# Patient Record
Sex: FEMALE | Race: BLACK OR AFRICAN AMERICAN | NOT HISPANIC OR LATINO | Employment: STUDENT | URBAN - METROPOLITAN AREA
[De-identification: names, ages, dates, MRNs, and addresses within clinical notes are randomized per-mention and may not be internally consistent; named-entity substitution may affect disease eponyms.]

---
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
## 2023-02-03 ENCOUNTER — PATIENT MESSAGE (OUTPATIENT)
Dept: PEDIATRICS | Facility: CLINIC | Age: 18
End: 2023-02-03
Payer: COMMERCIAL

## 2023-02-28 ENCOUNTER — HOSPITAL ENCOUNTER (EMERGENCY)
Facility: HOSPITAL | Age: 18
Discharge: HOME | End: 2023-02-28
Attending: EMERGENCY MEDICINE
Payer: COMMERCIAL

## 2023-02-28 VITALS
OXYGEN SATURATION: 98 % | RESPIRATION RATE: 16 BRPM | BODY MASS INDEX: 18.64 KG/M2 | HEIGHT: 66 IN | HEART RATE: 89 BPM | WEIGHT: 116 LBS | DIASTOLIC BLOOD PRESSURE: 74 MMHG | SYSTOLIC BLOOD PRESSURE: 136 MMHG | TEMPERATURE: 100 F

## 2023-02-28 DIAGNOSIS — J02.9 VIRAL PHARYNGITIS: ICD-10-CM

## 2023-02-28 DIAGNOSIS — J06.9 VIRAL URI WITH COUGH: Primary | ICD-10-CM

## 2023-02-28 LAB
B-HCG UR QL: NEGATIVE
CTP QC/QA: YES
MOLECULAR STREP A: NEGATIVE
POC MOLECULAR INFLUENZA A AGN: NEGATIVE
POC MOLECULAR INFLUENZA B AGN: NEGATIVE
SARS-COV-2 RDRP RESP QL NAA+PROBE: NEGATIVE

## 2023-02-28 PROCEDURE — 81025 URINE PREGNANCY TEST: CPT | Performed by: PHYSICIAN ASSISTANT

## 2023-02-28 PROCEDURE — 25000003 PHARM REV CODE 250: Performed by: PHYSICIAN ASSISTANT

## 2023-02-28 PROCEDURE — 87651 STREP A DNA AMP PROBE: CPT

## 2023-02-28 PROCEDURE — 96372 THER/PROPH/DIAG INJ SC/IM: CPT | Performed by: PHYSICIAN ASSISTANT

## 2023-02-28 PROCEDURE — 99284 EMERGENCY DEPT VISIT MOD MDM: CPT | Mod: 25

## 2023-02-28 PROCEDURE — 87502 INFLUENZA DNA AMP PROBE: CPT

## 2023-02-28 PROCEDURE — 63600175 PHARM REV CODE 636 W HCPCS: Performed by: PHYSICIAN ASSISTANT

## 2023-02-28 RX ORDER — IBUPROFEN 600 MG/1
600 TABLET ORAL
Status: COMPLETED | OUTPATIENT
Start: 2023-02-28 | End: 2023-02-28

## 2023-02-28 RX ORDER — DEXAMETHASONE SODIUM PHOSPHATE 4 MG/ML
12 INJECTION, SOLUTION INTRA-ARTICULAR; INTRALESIONAL; INTRAMUSCULAR; INTRAVENOUS; SOFT TISSUE
Status: COMPLETED | OUTPATIENT
Start: 2023-02-28 | End: 2023-02-28

## 2023-02-28 RX ORDER — GUAIFENESIN/DEXTROMETHORPHAN 100-10MG/5
10 SYRUP ORAL 4 TIMES DAILY PRN
Qty: 120 ML | Refills: 0 | Status: SHIPPED | OUTPATIENT
Start: 2023-02-28 | End: 2023-03-10

## 2023-02-28 RX ADMIN — DEXAMETHASONE SODIUM PHOSPHATE 12 MG: 4 INJECTION INTRA-ARTICULAR; INTRALESIONAL; INTRAMUSCULAR; INTRAVENOUS; SOFT TISSUE at 08:02

## 2023-02-28 RX ADMIN — IBUPROFEN 600 MG: 600 TABLET ORAL at 08:02

## 2023-02-28 NOTE — Clinical Note
"Alessandro Pérez" Sajan was seen and treated in our emergency department on 2/28/2023.  She may return to school on 03/01/2023.      If you have any questions or concerns, please don't hesitate to call.       RN"

## 2023-02-28 NOTE — Clinical Note
"Alessandro Pérez" Sajan was seen and treated in our emergency department on 2/28/2023.  She may return to school on 03/02/2023.      If you have any questions or concerns, please don't hesitate to call.       RN"

## 2023-03-01 NOTE — ED PROVIDER NOTES
Encounter Date: 2/28/2023       History     Chief Complaint   Patient presents with    flu like symptoms     The patient reports a headache, sore throat, dizziness, coughing up blood, and body aches x 3 days.Patient reports taking tylenol and a cough drop last night around 0800 and a cough drop this morning.     17-year-old female presents to ED complaining of 4 day history productive cough, congestion, odynophagia, fever, chills, myalgias, generally feeling unwell, poor appetite and intake.  Begin with scant blood mixed in with mucus during cough today.  No chest pain.  No shortness of breath.  No syncope or near-syncope.  Admits to feeling lightheaded.  No palpitations.  No significant cardiac or pulmonary issues.  No relief with over-the-counter cough drops, with Tylenol.  No fever since yesterday.  Symptoms are acute, constant, mild to moderate.  No alleviating or exacerbating factors.  No radiation symptoms.    No urinary complaints.  No abdominal pain.  No nausea vomiting diarrhea.  No history of any bleeding disorder.  No daily antiplatelet or anticoagulation use.    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     Past Surgical History:   Procedure Laterality Date    COLONOSCOPY N/A 8/6/2021    Procedure: COLONOSCOPY;  Surgeon: Constantino Shea MD;  Location: [address]);  Service: Endoscopy;  Laterality: N/A;    ESOPHAGOGASTRODUODENOSCOPY N/A 8/6/2021    Procedure: (EGD);  Surgeon: Constantino Shea MD;  Location: [address]);  Service: Endoscopy;  Laterality: N/A;  covid test 8/3 [facility]     Family History   Problem Relation Age of Onset    Sleep apnea Mother     Hypertension Mother     Mitral valve prolapse Mother     Alcohol abuse Mother     Drug abuse Mother     Alcohol abuse Father     Drug abuse Father     Mental illness Father     Congenital heart  disease Sister     Learning disabilities Brother     Diabetes Maternal Grandmother     Ovarian cancer Maternal Grandmother     Hypertension Maternal Grandmother     Hyperlipidemia Maternal Grandmother     Obesity Paternal Aunt     Diabetes Paternal Aunt     Obesity Paternal Uncle     Hypertension Paternal Uncle     Diabetes Paternal Uncle     Hyperlipidemia Paternal Uncle     Diabetes Paternal Grandmother     Hyperlipidemia Paternal Grandmother     Hypertension Paternal Grandmother     Cancer Paternal Grandmother 62        ovarian    Melanoma Neg Hx     Psoriasis Neg Hx     Lupus Neg Hx      Social History     Tobacco Use    Smoking status: Every Day     Types: Cigarettes    Smokeless tobacco: Current   Substance Use Topics    Alcohol use: No    Drug use: No     Review of Systems   Constitutional:  Positive for appetite change, chills and fever.   HENT:  Positive for congestion and sore throat.    Respiratory:  Positive for cough. Negative for shortness of breath.    Cardiovascular:  Negative for chest pain and palpitations.   Gastrointestinal:  Negative for abdominal pain, diarrhea, nausea and vomiting.   Genitourinary:  Negative for dysuria and frequency.   Musculoskeletal:  Positive for myalgias. Negative for neck pain and neck stiffness.   Neurological:  Positive for light-headedness and headaches. Negative for syncope.     Physical Exam     Initial Vitals [02/28/23 1836]   BP Pulse Resp Temp SpO2   136/74 89 16 100.1 °F (37.8 °C) 98 %      MAP       --         Physical Exam    Nursing note and vitals reviewed.  Constitutional: She appears well-developed and well-nourished. She is not diaphoretic. No distress.   HENT:   Head: Normocephalic and atraumatic.   Nasal congestion.  There is mild posterior oropharyngeal erythema, bilateral tonsils with white patchy exudate left greater than right.   Neck: Neck supple.   Bilateral anterior cervical adenopathy left-greater-than-right.   Normal range of  motion.  Cardiovascular:  Intact distal pulses.           Normal sinus rhythm   Pulmonary/Chest: Breath sounds normal. No respiratory distress.   Musculoskeletal:         General: Normal range of motion.      Cervical back: Normal range of motion and neck supple.     Neurological: She is alert and oriented to person, place, and time. GCS score is 15. GCS eye subscore is 4. GCS verbal subscore is 5. GCS motor subscore is 6.   Skin: Skin is warm. Capillary refill takes less than 2 seconds.   Psychiatric: She has a normal mood and affect. Thought content normal.       ED Course   Procedures  Labs Reviewed   SARS-COV-2 RDRP GENE   POCT INFLUENZA A/B MOLECULAR   POCT STREP A MOLECULAR   POCT URINE PREGNANCY          Imaging Results              X-Ray Chest PA And Lateral (Final result)  Result time 02/28/23 19:45:34      Final result by Caleb Mckeon MD (02/28/23 19:45:34)                   Impression:      1. No acute cardiopulmonary process.      Electronically signed by: Caleb Mckeon MD  Date:    02/28/2023  Time:    19:45               Narrative:    EXAMINATION:  XR CHEST PA AND LATERAL    CLINICAL HISTORY:  Other specified cough    TECHNIQUE:  PA and lateral views of the chest were performed.    COMPARISON:  08/28/2017    FINDINGS:  The cardiomediastinal silhouette is not enlarged.  There is no pleural effusion.  The trachea is midline.  The lungs are symmetrically expanded bilaterally without evidence of acute parenchymal process. No large focal consolidation seen.  There is no pneumothorax.  The osseous structures are unremarkable.                                       Medications   dexAMETHasone injection 12 mg (12 mg Intramuscular Given 2/28/23 2015)   ibuprofen tablet 600 mg (600 mg Oral Given 2/28/23 2014)     Medical Decision Making:   Differential Diagnosis:   Viral pharyngitis, viral URI, pneumonia, bronchitis, rhinitis, sinusitis  ED Management:  Suspected hemoptysis related to viral URI, possibly  bronchitis.  Suspect viral pharyngitis given negative strep.  Lungs clear.  No hypoxia.  Young and otherwise healthy with no significant comorbidities.  Advised continued cough drops p.r.n., begin antitussive, Tylenol ibuprofen as needed for odynophagia, headaches, myalgias etc..  Advised follow-up with local PCP for any persistent symptoms.  Advised to return if worsening lightheadedness, if she begins with large volume hemoptysis, if she feels near syncopal, if any other problems occur.                        Clinical Impression:   Final diagnoses:  [J06.9] Viral URI with cough (Primary)  [J02.9] Viral pharyngitis        ED Disposition Condition    Discharge Stable          ED Prescriptions       Medication Sig Dispense Start Date End Date Auth. Provider    dextromethorphan-guaiFENesin  mg/5 ml (ROBITUSSIN-DM)  mg/5 mL liquid Take 10 mLs by mouth 4 (four) times daily as needed (cough). 120 mL 2/28/2023 3/10/2023 Oleg Chapin PA-C          Follow-up Information       Follow up With Specialties Details Why Contact Info    [facility] Emergency Medicine  As needed [id number]  [facility] [id number] [phone]             Oleg Chapin PA-C  03/01/23 0144

## 2023-03-01 NOTE — DISCHARGE INSTRUCTIONS
Follow-up and establish care with a local primary care provider.    Robitussin as needed for cough.  Continue with Tylenol/ibuprofen as needed for fever, as needed for sore throat, as needed for body aches.  Drink lots of fluids, stay well hydrated.    Return to this ED if unable to treat fever, if you begin with shortness of breath, if you feel as if you are going to pass out, if you develop chest pain, if you continue with worsening bloody cough, if any other problems occur.

## 2023-03-01 NOTE — FIRST PROVIDER EVALUATION
Emergency Department TeleTriage Encounter Note      CHIEF COMPLAINT    Chief Complaint   Patient presents with    flu like symptoms     The patient reports a headache, sore throat, dizziness, coughing up blood, and body aches x 3 days.Patient reports taking tylenol and a cough drop last night around 0800 and a cough drop this morning.       VITAL SIGNS   Initial Vitals [02/28/23 1836]   BP Pulse Resp Temp SpO2   136/74 89 16 100.1 °F (37.8 °C) 98 %      MAP       --            ALLERGIES    Review of patient's allergies indicates:  No Known Allergies    PROVIDER TRIAGE NOTE  Patient presenting with sore throat, headaches, body aches x 3 days.  She has coughing productive of yellow mucous with blood on 2 occasions.      Viral swabs , strep swab ordered.    Chest xray pending ED provider evaluation.      Initial orders will be placed and care will be transferred to an alternate provider when patient is roomed for a full evaluation. Any additional orders and the final disposition will be determined by that provider.         ORDERS  Labs Reviewed   SARS-COV-2 RDRP GENE   POCT INFLUENZA A/B MOLECULAR   POCT STREP A MOLECULAR       ED Orders (720h ago, onward)      Start Ordered     Status Ordering Provider    02/28/23 1840 02/28/23 1839  POCT COVID-19 Rapid Screening  Once         Ordered OLRIN ORTIZ.    02/28/23 1840 02/28/23 1839  POCT Influenza A/B Molecular  Once         Ordered LORIN ORTIZ.    02/28/23 1840 02/28/23 1839  POCT Strep A, Molecular  Once         Ordered LORIN ORTIZ.              Virtual Visit Note: The provider triage portion of this emergency department evaluation and documentation was performed via [employer], a HIPAA-compliant telemedicine application, in concert with a tele-presenter in the room. A face to face patient evaluation with one of my colleagues will occur once the patient is placed in an emergency department room.      DISCLAIMER: This note was prepared with  [employer] voice recognition transcription software. Garbled syntax, mangled pronouns, and other bizarre constructions may be attributed to that software system.

## 2023-06-27 ENCOUNTER — HOSPITAL ENCOUNTER (EMERGENCY)
Facility: HOSPITAL | Age: 18
Discharge: HOME | End: 2023-06-27
Attending: EMERGENCY MEDICINE
Payer: COMMERCIAL

## 2023-06-27 VITALS
TEMPERATURE: 98 F | HEIGHT: 67 IN | HEART RATE: 79 BPM | SYSTOLIC BLOOD PRESSURE: 113 MMHG | DIASTOLIC BLOOD PRESSURE: 68 MMHG | BODY MASS INDEX: 18.05 KG/M2 | WEIGHT: 115 LBS | OXYGEN SATURATION: 100 % | RESPIRATION RATE: 17 BRPM

## 2023-06-27 DIAGNOSIS — T14.90XA INJURY: ICD-10-CM

## 2023-06-27 DIAGNOSIS — M25.529 ELBOW PAIN: ICD-10-CM

## 2023-06-27 DIAGNOSIS — M25.512 ACUTE PAIN OF LEFT SHOULDER: Primary | ICD-10-CM

## 2023-06-27 DIAGNOSIS — S40.012A CONTUSION OF LEFT SHOULDER, INITIAL ENCOUNTER: ICD-10-CM

## 2023-06-27 DIAGNOSIS — M54.2 NECK PAIN: ICD-10-CM

## 2023-06-27 LAB
B-HCG UR QL: NEGATIVE
CTP QC/QA: YES

## 2023-06-27 PROCEDURE — 81025 URINE PREGNANCY TEST: CPT | Mod: ER | Performed by: EMERGENCY MEDICINE

## 2023-06-27 PROCEDURE — 96372 THER/PROPH/DIAG INJ SC/IM: CPT | Performed by: PHYSICIAN ASSISTANT

## 2023-06-27 PROCEDURE — 99284 EMERGENCY DEPT VISIT MOD MDM: CPT | Mod: ER

## 2023-06-27 PROCEDURE — 25000003 PHARM REV CODE 250: Mod: ER | Performed by: PHYSICIAN ASSISTANT

## 2023-06-27 PROCEDURE — 63600175 PHARM REV CODE 636 W HCPCS: Mod: ER | Performed by: PHYSICIAN ASSISTANT

## 2023-06-27 RX ORDER — IBUPROFEN 600 MG/1
600 TABLET ORAL EVERY 6 HOURS PRN
Qty: 20 TABLET | Refills: 0 | Status: SHIPPED | OUTPATIENT
Start: 2023-06-27 | End: 2023-07-02

## 2023-06-27 RX ORDER — ACETAMINOPHEN 500 MG
500 TABLET ORAL EVERY 4 HOURS PRN
Qty: 20 TABLET | Refills: 0 | Status: SHIPPED | OUTPATIENT
Start: 2023-06-27 | End: 2023-07-02

## 2023-06-27 RX ORDER — KETOROLAC TROMETHAMINE 30 MG/ML
30 INJECTION, SOLUTION INTRAMUSCULAR; INTRAVENOUS
Status: COMPLETED | OUTPATIENT
Start: 2023-06-27 | End: 2023-06-27

## 2023-06-27 RX ORDER — CYCLOBENZAPRINE HCL 10 MG
10 TABLET ORAL 3 TIMES DAILY PRN
Qty: 20 TABLET | Refills: 0 | Status: SHIPPED | OUTPATIENT
Start: 2023-06-27 | End: 2023-07-04

## 2023-06-27 RX ORDER — CYCLOBENZAPRINE HCL 10 MG
10 TABLET ORAL
Status: COMPLETED | OUTPATIENT
Start: 2023-06-27 | End: 2023-06-27

## 2023-06-27 RX ORDER — LIDOCAINE 50 MG/G
1 PATCH TOPICAL DAILY
Qty: 15 PATCH | Refills: 0 | Status: SHIPPED | OUTPATIENT
Start: 2023-06-27 | End: 2023-07-12

## 2023-06-27 RX ADMIN — CYCLOBENZAPRINE 10 MG: 10 TABLET, FILM COATED ORAL at 03:06

## 2023-06-27 RX ADMIN — KETOROLAC TROMETHAMINE 30 MG: 30 INJECTION, SOLUTION INTRAMUSCULAR; INTRAVENOUS at 03:06

## 2023-06-27 NOTE — ED PROVIDER NOTES
"Encounter Date: 6/27/2023       History     Chief Complaint   Patient presents with    Arm Injury     Pt states " I think my arm is fractured." Pt unable to move right arm. Pulses 2+. Ibuprofen taken 1 hour pta     18yo female presents complaining of unilateral R-sided shoulder and elbow pain since yesterday after falling onto area. Pt states she was playing with her nephew when she fell onto her R elbow and then onto her R shoulder on hardwood floor. Pt states she went to [facility] yesterday where Xrays of R shoulder and R humerus were performed displaying no acute fractures. Pt denies any open wounds or lacerations to area. Pt denies any head trauma. Pt denies any involvement of alcohol/drugs. Pt admits to associated intermittent numbness and tingling along RUE. Pt has taken ibuprofen with no relief of symptoms. Pt currently wearing R sided shoulder sling.     The history is provided by the patient.   Review of patient's allergies indicates:  No Known Allergies  Past Medical History:   Diagnosis Date    Proteinuria, postural 2012    benign     Past Surgical History:   Procedure Laterality Date    COLONOSCOPY N/A 8/6/2021    Procedure: COLONOSCOPY;  Surgeon: Constantino Shea MD;  Location: [facility] ([address] [place] [address]);  Service: Endoscopy;  Laterality: N/A;    ESOPHAGOGASTRODUODENOSCOPY N/A 8/6/2021    Procedure: (EGD);  Surgeon: Constantino Shea MD;  Location: [facility] ([address] [place] [address]);  Service: Endoscopy;  Laterality: N/A;  covid test 8/3 [facility]     Family History   Problem Relation Age of Onset    Sleep apnea Mother     Hypertension Mother     Mitral valve prolapse Mother     Alcohol abuse Mother     Drug abuse Mother     Alcohol abuse Father     Drug abuse Father     Mental illness Father     Congenital heart disease Sister     Learning disabilities Brother     Diabetes Maternal Grandmother     Ovarian cancer Maternal Grandmother     Hypertension Maternal Grandmother     Hyperlipidemia Maternal Grandmother     Obesity " Paternal Aunt     Diabetes Paternal Aunt     Obesity Paternal Uncle     Hypertension Paternal Uncle     Diabetes Paternal Uncle     Hyperlipidemia Paternal Uncle     Diabetes Paternal Grandmother     Hyperlipidemia Paternal Grandmother     Hypertension Paternal Grandmother     Cancer Paternal Grandmother 62        ovarian    Melanoma Neg Hx     Psoriasis Neg Hx     Lupus Neg Hx      Social History     Tobacco Use    Smoking status: Every Day     Types: Cigarettes    Smokeless tobacco: Current   Substance Use Topics    Alcohol use: No    Drug use: No     Review of Systems   Constitutional: Negative.  Negative for fever.   HENT:  Negative for sore throat.    Respiratory: Negative.  Negative for shortness of breath.    Cardiovascular: Negative.  Negative for chest pain.   Gastrointestinal:  Negative for nausea.   Genitourinary:  Negative for dysuria.   Musculoskeletal:  Positive for arthralgias, myalgias and neck pain. Negative for back pain.   Skin: Negative.  Negative for rash.   Neurological: Negative.  Negative for weakness.   Hematological:  Does not bruise/bleed easily.     Physical Exam     Initial Vitals [06/27/23 1432]   BP Pulse Resp Temp SpO2   110/75 90 16 98.3 °F (36.8 °C) 100 %      MAP       --         Physical Exam    Nursing note and vitals reviewed.  Constitutional: She appears well-developed and well-nourished. No distress.   HENT:   Head: Normocephalic and atraumatic.   Eyes: EOM are normal. Pupils are equal, round, and reactive to light.   Neck:   Normal range of motion.  Cardiovascular:  Intact distal pulses.           Pulses:       Radial pulses are 2+ on the right side and 2+ on the left side.   Musculoskeletal:      Right shoulder: Tenderness present. No deformity or laceration. Decreased range of motion. Normal pulse.      Left shoulder: Normal. Normal range of motion. Normal pulse.      Cervical back: Normal range of motion. No swelling, edema, deformity or tenderness. Normal range of motion.       Comments: No midline ttp of spine  TTP over bilateral cervical paraspinal musculature  TTP over R anterior shoulder with limited ROM to 45 degrees due to pain.   TTP over the R proximal humerus  No bony ttp to the R elbow, R forearm wrist or hand  Decreased  strength to R hand due to reported pain in R shoulder with strength testing  Pt able to flex and extend the R wrist and elbow fully        Neurological: She is alert and oriented to person, place, and time. No sensory deficit.       ED Course   Procedures  Labs Reviewed   POCT URINE PREGNANCY          Imaging Results              X-Ray Cervical Spine AP And Lateral (Final result)  Result time 06/27/23 17:04:05      Final result by Jb Fontenot MD (06/27/23 17:04:05)                   Impression:      No acute radiographic abnormality.      Electronically signed by: Jb Fontenot  Date:    06/27/2023  Time:    17:04               Narrative:    EXAMINATION:  XR CERVICAL SPINE AP LATERAL    CLINICAL HISTORY:  Cervicalgia    TECHNIQUE:  AP, lateral and open mouth views of the cervical spine were performed.    COMPARISON:  None.    FINDINGS:  No acute fracture or traumatic subluxation.  No significant degenerative change.  Soft tissues are within normal limits.  The odontoid process is midline.                                       X-Ray Humerus 2 View Right (Final result)  Result time 06/27/23 17:05:19   Procedure changed from X-Ray Elbow Complete Right     Final result by Jb Fontenot MD (06/27/23 17:05:19)                   Impression:      No acute radiographic abnormality.      Electronically signed by: Jb Fontenot  Date:    06/27/2023  Time:    17:05               Narrative:    EXAMINATION:  XR HUMERUS 2 VIEW RIGHT    CLINICAL HISTORY:  pain;  Pain in unspecified elbow    TECHNIQUE:  Two views of the right humerus    COMPARISON:  None    FINDINGS:  No acute fracture, subluxation or dislocation.  No mass or foreign body.  No significant  arthropathy.                                       X-Ray Shoulder Complete 2 View Right (Final result)  Result time 06/27/23 17:07:23      Final result by Jb Fontenot MD (06/27/23 17:07:23)                   Impression:      No acute radiographic abnormality.      Electronically signed by: bJ Fontenot  Date:    06/27/2023  Time:    17:07               Narrative:    EXAMINATION:  XR SHOULDER COMPLETE 2 OR MORE VIEWS RIGHT    CLINICAL HISTORY:  Injury, unspecified, initial encounter    TECHNIQUE:  Two or three views of the right shoulder were performed.    COMPARISON:  10/20/2013    FINDINGS:  No acute fracture, subluxation or dislocation.  Right hemithorax is clear.  No osseous destruction.  The AC joint is intact.  The humeral head is normally positioned.                                       Medications   ketorolac injection 30 mg (30 mg Intramuscular Given 6/27/23 1537)   cyclobenzaprine tablet 10 mg (10 mg Oral Given 6/27/23 1537)     Medical Decision Making:   Clinical Tests:   Lab Tests: Ordered and Reviewed  Radiological Study: Reviewed and Ordered  ED Management:  16 y/o presenting for evaluation s/p fall that occurred yesterday   Exam above.  No midline tenderness of the spine.  There is tenderness over the left cervical paraspinous musculature.  X-ray C-spine Rshoulder and Rhumerus negative for fracture dislocation.  No neurovascular deficits.  Think this is likely contusion/cervical strain.  Toradol IM and Flexeril p.o. given in the ED.  Patient is sleeping and resting comfortably prior to discharge.  Will have her follow up with Orthopedics for further evaluation management.  Will have her return to ER for worsening or as needed.                        Clinical Impression:   Final diagnoses:  [T14.90XA] Injury  [M54.2] Neck pain  [M25.529] Elbow pain  [M25.512] Acute pain of left shoulder (Primary)  [S40.012A] Contusion of left shoulder, initial encounter        ED Disposition Condition     Discharge Stable          ED Prescriptions       Medication Sig Dispense Start Date End Date Auth. Provider    ibuprofen (ADVIL,MOTRIN) 600 MG tablet Take 1 tablet (600 mg total) by mouth every 6 (six) hours as needed for Pain. 20 tablet 6/27/2023 7/2/2023 Shannan Butler PA-C    acetaminophen (TYLENOL) 500 MG tablet Take 1 tablet (500 mg total) by mouth every 4 (four) hours as needed. 20 tablet 6/27/2023 7/2/2023 Shannan Butler PA-C    cyclobenzaprine (FLEXERIL) 10 MG tablet Take 1 tablet (10 mg total) by mouth 3 (three) times daily as needed for Muscle spasms. MAY CAUSE DROWSINESS 20 tablet 6/27/2023 7/4/2023 Shannan Butler PA-C    LIDOcaine (LIDODERM) 5 % Place 1 patch onto the skin once daily. Remove & Discard patch within 12 hours or as directed by MD. May use 4% over the counter if not covered by insurance for 15 days 15 patch 6/27/2023 7/12/2023 Shannan Butler PA-C          Follow-up Information       Follow up With Specialties Details Why Contact Info    Maggie Curtis MD Pediatrics Schedule an appointment as soon as possible for a visit in 2 days for follow up [address] [facility] [address] [phone]      Ochoa Serrato MD Orthopedic Surgery Schedule an appointment as soon as possible for a visit  for follow up with orthopedics [address]  [facility] [id number] [phone]      Jose Luis Melendrez MD Orthopedic Surgery, Hand Surgery Schedule an appointment as soon as possible for a visit  for orthopedics follow up [address]  [facility] [address] [phone]      Yusuf Licea MD Orthopedic Surgery Schedule an appointment as soon as possible for a visit in 2 days for follow up [address] [facility]  [address]  [facility] [address] [phone]      [facility] ED Emergency Medicine Go to  As needed, If symptoms worsen [address] [facility] [address] [phone]             Shannan Butler PA-C  06/27/23 1733        Shannan Butler PA-C  06/27/23 1736

## 2023-06-27 NOTE — DISCHARGE INSTRUCTIONS

## 2023-06-27 NOTE — ED TRIAGE NOTES
Pt arrived to the ED via personal transport due to right arm injury yesterday while playing around. Pt reports she is unable to flex/extend right arm due to severe pain of 10/10 at the right shoulder. Denies past med hx/sx. Ambulates independently. Cap refill and radial pulse present. Alert and oriented x4.

## 2023-07-06 ENCOUNTER — PATIENT MESSAGE (OUTPATIENT)
Dept: PEDIATRICS | Facility: CLINIC | Age: 18
End: 2023-07-06
Payer: COMMERCIAL

## 2023-12-22 ENCOUNTER — HOSPITAL ENCOUNTER (EMERGENCY)
Facility: HOSPITAL | Age: 18
Discharge: HOME | End: 2023-12-22
Attending: INTERNAL MEDICINE
Payer: COMMERCIAL

## 2023-12-22 VITALS
WEIGHT: 116 LBS | SYSTOLIC BLOOD PRESSURE: 121 MMHG | OXYGEN SATURATION: 100 % | TEMPERATURE: 99 F | HEART RATE: 85 BPM | RESPIRATION RATE: 17 BRPM | DIASTOLIC BLOOD PRESSURE: 87 MMHG | BODY MASS INDEX: 18.17 KG/M2

## 2023-12-22 DIAGNOSIS — J06.9 VIRAL URI WITH COUGH: Primary | ICD-10-CM

## 2023-12-22 DIAGNOSIS — N75.0 INFECTED CYST OF BARTHOLIN'S GLAND DUCT: ICD-10-CM

## 2023-12-22 LAB
B-HCG UR QL: NEGATIVE
CTP QC/QA: YES

## 2023-12-22 PROCEDURE — 25000003 PHARM REV CODE 250: Mod: ER | Performed by: PHYSICIAN ASSISTANT

## 2023-12-22 PROCEDURE — 99284 EMERGENCY DEPT VISIT MOD MDM: CPT | Mod: 25,ER

## 2023-12-22 PROCEDURE — 56420 I&D BARTHOLINS GLAND ABSCESS: CPT | Mod: ER

## 2023-12-22 PROCEDURE — 81025 URINE PREGNANCY TEST: CPT | Mod: ER | Performed by: INTERNAL MEDICINE

## 2023-12-22 RX ORDER — SULFAMETHOXAZOLE AND TRIMETHOPRIM 800; 160 MG/1; MG/1
1 TABLET ORAL 2 TIMES DAILY
Qty: 19 TABLET | Refills: 0 | Status: SHIPPED | OUTPATIENT
Start: 2023-12-22 | End: 2024-01-01

## 2023-12-22 RX ORDER — BENZONATATE 200 MG/1
200 CAPSULE ORAL 3 TIMES DAILY PRN
Qty: 20 CAPSULE | Refills: 0 | Status: SHIPPED | OUTPATIENT
Start: 2023-12-22 | End: 2023-12-29

## 2023-12-22 RX ORDER — LIDOCAINE HYDROCHLORIDE 10 MG/ML
10 INJECTION INFILTRATION; PERINEURAL
Status: COMPLETED | OUTPATIENT
Start: 2023-12-22 | End: 2023-12-22

## 2023-12-22 RX ORDER — IBUPROFEN 800 MG/1
800 TABLET ORAL EVERY 6 HOURS PRN
Qty: 20 TABLET | Refills: 0 | Status: SHIPPED | OUTPATIENT
Start: 2023-12-22

## 2023-12-22 RX ORDER — SULFAMETHOXAZOLE AND TRIMETHOPRIM 800; 160 MG/1; MG/1
1 TABLET ORAL
Status: COMPLETED | OUTPATIENT
Start: 2023-12-22 | End: 2023-12-22

## 2023-12-22 RX ORDER — OXYCODONE AND ACETAMINOPHEN 5; 325 MG/1; MG/1
1 TABLET ORAL
Status: COMPLETED | OUTPATIENT
Start: 2023-12-22 | End: 2023-12-22

## 2023-12-22 RX ADMIN — OXYCODONE HYDROCHLORIDE AND ACETAMINOPHEN 1 TABLET: 5; 325 TABLET ORAL at 05:12

## 2023-12-22 RX ADMIN — LIDOCAINE HYDROCHLORIDE 10 ML: 10 INJECTION, SOLUTION INFILTRATION; PERINEURAL at 05:12

## 2023-12-22 RX ADMIN — SULFAMETHOXAZOLE AND TRIMETHOPRIM 1 TABLET: 800; 160 TABLET ORAL at 06:12

## 2023-12-22 NOTE — Clinical Note
"Alessandro Pérez" Sajan was seen and treated in our emergency department on 12/22/2023.  She may return to work on 12/25/2023.       If you have any questions or concerns, please don't hesitate to call.      Halima Sweeney PA-C"

## 2023-12-22 NOTE — ED PROVIDER NOTES
"Encounter Date: 12/22/2023    SCRIBE #1 NOTE: IKAYKAY am scribing for, and in the presence of,  Halima Sweeney PA-C. I have scribed the following portions of the note - Other sections scribed: HPI, ROS, PE.       History     Chief Complaint   Patient presents with    Bartholin's Cyst     X 1 week.     Cough     "Flu like symptoms." Productive cough x 1 week      Alessandro Moeller is a 18 y.o. female, with no pertinent PMHx, who presents to the ED with a cough which started 1 week ago. Associated symptoms include congestion and chills. She attempted treatment with Mucinex and Coricidin to minimal relief. No other exacerbating or alleviating factors. Denies fever, rhinorrhea, sore throat, nausea, vomiting, diarrhea, or other associated symptoms.    Pt also complains of a left sided vaginal abscess which appeared 1 week ago. Abscess is painful when wiping. Pt denies any history of similar abscess. Denies any vaginal discharge, dysuria, hematuria, or other associated symptoms.  She is sexually active, same sex.  No concerns for STD.    The history is provided by the patient and a parent. No  was used.     Review of patient's allergies indicates:  No Known Allergies  Past Medical History:   Diagnosis Date    Proteinuria, postural 2012    benign     Past Surgical History:   Procedure Laterality Date    COLONOSCOPY N/A 8/6/2021    Procedure: COLONOSCOPY;  Surgeon: Constantino Shea MD;  Location: [address]);  Service: Endoscopy;  Laterality: N/A;    ESOPHAGOGASTRODUODENOSCOPY N/A 8/6/2021    Procedure: (EGD);  Surgeon: Constantino Shea MD;  Location: [address]);  Service: Endoscopy;  Laterality: N/A;  covid test 8/3 [facility]     Family History   Problem Relation Age of Onset    Sleep apnea Mother     Hypertension Mother     Mitral valve prolapse Mother     Alcohol abuse Mother     Drug abuse Mother     Alcohol abuse Father     Drug abuse Father     Mental illness Father  "    Congenital heart disease Sister     Learning disabilities Brother     Diabetes Maternal Grandmother     Ovarian cancer Maternal Grandmother     Hypertension Maternal Grandmother     Hyperlipidemia Maternal Grandmother     Obesity Paternal Aunt     Diabetes Paternal Aunt     Obesity Paternal Uncle     Hypertension Paternal Uncle     Diabetes Paternal Uncle     Hyperlipidemia Paternal Uncle     Diabetes Paternal Grandmother     Hyperlipidemia Paternal Grandmother     Hypertension Paternal Grandmother     Cancer Paternal Grandmother 62        ovarian    Melanoma Neg Hx     Psoriasis Neg Hx     Lupus Neg Hx      Social History     Tobacco Use    Smoking status: Every Day     Types: Cigarettes    Smokeless tobacco: Current   Substance Use Topics    Alcohol use: No    Drug use: No     Review of Systems   Constitutional:  Positive for chills. Negative for fever.   HENT:  Positive for congestion.    Eyes:  Negative for visual disturbance.   Respiratory:  Positive for cough. Negative for shortness of breath.    Cardiovascular:  Negative for chest pain.   Gastrointestinal:  Negative for nausea and vomiting.   Genitourinary:  Positive for vaginal pain. Negative for dysuria, flank pain, hematuria and vaginal discharge.   Musculoskeletal:  Negative for myalgias.   Skin:  Negative for rash.        (+) Abscess   Allergic/Immunologic: Negative for immunocompromised state.   Neurological:  Negative for weakness and numbness.   Hematological:  Does not bruise/bleed easily.   Psychiatric/Behavioral:  Negative for confusion.        Physical Exam     Initial Vitals [12/22/23 1619]   BP Pulse Resp Temp SpO2   123/86 87 17 98.7 °F (37.1 °C) 100 %      MAP       --         Physical Exam    Vitals reviewed.  Constitutional: She appears well-developed and well-nourished. She is not diaphoretic. No distress.   HENT:   Head: Normocephalic and atraumatic.   Eyes: Conjunctivae and EOM are normal.   Neck: Neck supple.   Cardiovascular:  Normal  rate, regular rhythm, normal heart sounds and intact distal pulses.           Pulmonary/Chest: Breath sounds normal. No respiratory distress.   Genitourinary:    Vaginal tenderness (Left-sided infected Bartholin's cyst) present.   There is tenderness (Left-sided infected Bartholin's cyst) in the vagina.   Musculoskeletal:         General: Normal range of motion.      Cervical back: Neck supple.     Neurological: She is alert and oriented to person, place, and time.   Skin: Skin is warm.         ED Course   I & D - Incision and Drainage    Date/Time: 12/22/2023 6:09 PM  Location procedure was performed: [facility] EMERGENCY DEPARTMENT    Performed by: Halima Sweeney PA-C  Authorized by: Saul Hyman MD  Type: abscess  Body area: anogenital  Location details: Bartholin's gland  Anesthesia: local infiltration    Anesthesia:  Local Anesthetic: lidocaine 1% without epinephrine    Patient sedated: no  Scalpel size: 11  Incision type: single straight  Incision depth: dermal  Complexity: simple  Drainage: pus  Drainage amount: copious  Wound treatment: incision and cath placed  Patient tolerance: Patient tolerated the procedure well with no immediate complications    Incision depth: dermal        Labs Reviewed   POCT URINE PREGNANCY          Imaging Results    None          Medications   sulfamethoxazole-trimethoprim 800-160mg per tablet 1 tablet (has no administration in time range)   LIDOcaine HCL 10 mg/ml (1%) injection 10 mL (10 mLs Infiltration Given 12/22/23 1715)   oxyCODONE-acetaminophen 5-325 mg per tablet 1 tablet (1 tablet Oral Given 12/22/23 1714)     Medical Decision Making  Differential diagnosis:    STD, Bartholin's cyst, infected Bartholin's cyst, herpes lesion    URI, viral syndrome    Amount and/or Complexity of Data Reviewed  Labs: ordered.    Risk  Prescription drug management.         APC / Resident Notes:   Patient seen in the ER promptly upon arrival.  She is afebrile, no acute distress.  Heart  lung sounds are normal.  Patient able to speak in complete full sentences without difficulty.   examination was done with nursing staff present in the room.  Large infected Bartholin's cyst noted to the left vulva.    Abscess was drained.  1% lidocaine uses anesthetic agent.  Word catheter placed.  See procedure note.    Will discharge patient home with Tessalon Perles for cough.  Will also cover with Bactrim for the infected cyst.  Will advise patient to finish full course of antibiotics.  Advised to take ibuprofen for pain control.  Will give follow-up information and referral for OBGYN to have the catheter removed.  Advised to keep the affected region dry and clean at all times.  Given strict return precautions ED including but not limited to worsening pain, swelling, drainage, fever or chills.  Patient and mother agreeable to close follow-up.  Stable for discharge.    Disclaimer: This note has been generated using voice-recognition software. There may be typographical errors that have been missed during proof-reading.                                 IHalima personally performed the services described in this documentation. All medical record entries made by the scribe were at my direction and in my presence.  I have reviewed the chart and agree that the record reflects my personal performance and is accurate and complete. Halima Sweeney PA-C  6:15 PM 12/22/2023    Clinical Impression:  Final diagnoses:  [J06.9] Viral URI with cough (Primary)  [N75.0] Infected cyst of Bartholin's gland duct          ED Disposition Condition    Discharge Stable          ED Prescriptions       Medication Sig Dispense Start Date End Date Auth. Provider    sulfamethoxazole-trimethoprim 800-160mg (BACTRIM DS) 800-160 mg Tab Take 1 tablet by mouth 2 (two) times daily. for 10 days 19 tablet 12/22/2023 1/1/2024 Halima Sweeney PA-C    ibuprofen (ADVIL,MOTRIN) 800 MG tablet Take 1 tablet (800 mg total) by mouth every 6  (six) hours as needed for Pain. 20 tablet 12/22/2023 -- Halima Sweeney PA-C    benzonatate (TESSALON) 200 MG capsule Take 1 capsule (200 mg total) by mouth 3 (three) times daily as needed for Cough. 20 capsule 12/22/2023 -- Halima Sweeney PA-C          Follow-up Information       Follow up With Specialties Details Why Contact Info Additional Information    [facility] Obstetrics and Gynecology   [address] [facility] [address] [place] [address] [phone] Please park in garage or Medical Ofc Bldg surface lot and use Medical Office Bldg elevator. If you are here for a visit with your OB/GYN, please check in on the 3rd floor, [address].  If you are here for Fetal Monitoring with the nurse, please check in on the 2nd Floor, [address].             Halima Sweeney PA-C  12/22/23 7583

## 2023-12-23 NOTE — DISCHARGE INSTRUCTIONS
Do not remove the catheter.  Keep the affected region dry and clean at all times.  Follow up with OBGYN this week.  Finish full course of antibiotics provided.  Take ibuprofen for pain control.    Take cough medication as prescribed.

## 2023-12-26 ENCOUNTER — OFFICE VISIT (OUTPATIENT)
Dept: OBSTETRICS AND GYNECOLOGY | Facility: CLINIC | Age: 18
End: 2023-12-26
Payer: COMMERCIAL

## 2023-12-26 ENCOUNTER — HOSPITAL ENCOUNTER (EMERGENCY)
Facility: HOSPITAL | Age: 18
Discharge: HOME | End: 2023-12-26
Attending: STUDENT IN AN ORGANIZED HEALTH CARE EDUCATION/TRAINING PROGRAM
Payer: COMMERCIAL

## 2023-12-26 VITALS — DIASTOLIC BLOOD PRESSURE: 80 MMHG | WEIGHT: 110.44 LBS | SYSTOLIC BLOOD PRESSURE: 130 MMHG | BODY MASS INDEX: 17.3 KG/M2

## 2023-12-26 VITALS
DIASTOLIC BLOOD PRESSURE: 70 MMHG | OXYGEN SATURATION: 99 % | TEMPERATURE: 99 F | RESPIRATION RATE: 18 BRPM | HEART RATE: 79 BPM | SYSTOLIC BLOOD PRESSURE: 131 MMHG

## 2023-12-26 DIAGNOSIS — N75.0 BARTHOLIN CYST: Primary | ICD-10-CM

## 2023-12-26 DIAGNOSIS — N75.0 INFECTED CYST OF BARTHOLIN'S GLAND DUCT: ICD-10-CM

## 2023-12-26 DIAGNOSIS — Z51.89 WOUND CHECK, ABSCESS: ICD-10-CM

## 2023-12-26 PROCEDURE — 3079F PR MOST RECENT DIASTOLIC BLOOD PRESSURE 80-89 MM HG: ICD-10-PCS | Mod: CPTII,S$GLB,, | Performed by: STUDENT IN AN ORGANIZED HEALTH CARE EDUCATION/TRAINING PROGRAM

## 2023-12-26 PROCEDURE — 1159F MED LIST DOCD IN RCRD: CPT | Mod: CPTII,S$GLB,, | Performed by: STUDENT IN AN ORGANIZED HEALTH CARE EDUCATION/TRAINING PROGRAM

## 2023-12-26 PROCEDURE — 1159F PR MEDICATION LIST DOCUMENTED IN MEDICAL RECORD: ICD-10-PCS | Mod: CPTII,S$GLB,, | Performed by: STUDENT IN AN ORGANIZED HEALTH CARE EDUCATION/TRAINING PROGRAM

## 2023-12-26 PROCEDURE — 3075F SYST BP GE 130 - 139MM HG: CPT | Mod: CPTII,S$GLB,, | Performed by: STUDENT IN AN ORGANIZED HEALTH CARE EDUCATION/TRAINING PROGRAM

## 2023-12-26 PROCEDURE — 99283 EMERGENCY DEPT VISIT LOW MDM: CPT

## 2023-12-26 PROCEDURE — 99999 PR PBB SHADOW E&M-EST. PATIENT-LVL III: CPT | Mod: PBBFAC,,, | Performed by: STUDENT IN AN ORGANIZED HEALTH CARE EDUCATION/TRAINING PROGRAM

## 2023-12-26 PROCEDURE — 99999 PR PBB SHADOW E&M-EST. PATIENT-LVL III: ICD-10-PCS | Mod: PBBFAC,,, | Performed by: STUDENT IN AN ORGANIZED HEALTH CARE EDUCATION/TRAINING PROGRAM

## 2023-12-26 PROCEDURE — 99203 PR OFFICE/OUTPT VISIT, NEW, LEVL III, 30-44 MIN: ICD-10-PCS | Mod: S$GLB,,, | Performed by: STUDENT IN AN ORGANIZED HEALTH CARE EDUCATION/TRAINING PROGRAM

## 2023-12-26 PROCEDURE — 3079F DIAST BP 80-89 MM HG: CPT | Mod: CPTII,S$GLB,, | Performed by: STUDENT IN AN ORGANIZED HEALTH CARE EDUCATION/TRAINING PROGRAM

## 2023-12-26 PROCEDURE — 99203 OFFICE O/P NEW LOW 30 MIN: CPT | Mod: S$GLB,,, | Performed by: STUDENT IN AN ORGANIZED HEALTH CARE EDUCATION/TRAINING PROGRAM

## 2023-12-26 PROCEDURE — 3008F PR BODY MASS INDEX (BMI) DOCUMENTED: ICD-10-PCS | Mod: CPTII,S$GLB,, | Performed by: STUDENT IN AN ORGANIZED HEALTH CARE EDUCATION/TRAINING PROGRAM

## 2023-12-26 PROCEDURE — 3008F BODY MASS INDEX DOCD: CPT | Mod: CPTII,S$GLB,, | Performed by: STUDENT IN AN ORGANIZED HEALTH CARE EDUCATION/TRAINING PROGRAM

## 2023-12-26 PROCEDURE — 3075F PR MOST RECENT SYSTOLIC BLOOD PRESS GE 130-139MM HG: ICD-10-PCS | Mod: CPTII,S$GLB,, | Performed by: STUDENT IN AN ORGANIZED HEALTH CARE EDUCATION/TRAINING PROGRAM

## 2023-12-26 RX ORDER — OXYCODONE AND ACETAMINOPHEN 5; 325 MG/1; MG/1
1 TABLET ORAL EVERY 6 HOURS PRN
Qty: 12 TABLET | Refills: 0 | Status: SHIPPED | OUTPATIENT
Start: 2023-12-26

## 2023-12-26 NOTE — PROGRESS NOTES
History & Physical  Gynecology      SUBJECTIVE:     Chief Complaint: Cyst (Vaginal cyst) and barthalin cyst       History of Present Illness:  Alessandro Moeller is a 18 y.o. F who presents for follow up of I&D of Bartholin cyst. First started noticing pain and swelling on 12/15. Over the week it got much worse, prompting her to go the ED on 12/22. She had I&D performed with copious purulent drainage. Word catheter was placed. She does have significant improvement, although the word catheter is irritating. She is still taking antibiotics that were given to her in the ED. No fever or other issues.      Review of patient's allergies indicates:  No Known Allergies    Past Medical History:   Diagnosis Date    Proteinuria, postural 2012    benign     Past Surgical History:   Procedure Laterality Date    COLONOSCOPY N/A 8/6/2021    Procedure: COLONOSCOPY;  Surgeon: Constantino Shea MD;  Location: [facility] ([address] [place] [address]);  Service: Endoscopy;  Laterality: N/A;    ESOPHAGOGASTRODUODENOSCOPY N/A 8/6/2021    Procedure: (EGD);  Surgeon: Constantino Shea MD;  Location: [facility] ([address] [place] [address]);  Service: Endoscopy;  Laterality: N/A;  covid test 8/3 [facility]     OB History    No obstetric history on file.       Family History   Problem Relation Age of Onset    Sleep apnea Mother     Hypertension Mother     Mitral valve prolapse Mother     Alcohol abuse Mother     Drug abuse Mother     Alcohol abuse Father     Drug abuse Father     Mental illness Father     Congenital heart disease Sister     Learning disabilities Brother     Diabetes Maternal Grandmother     Ovarian cancer Maternal Grandmother     Hypertension Maternal Grandmother     Hyperlipidemia Maternal Grandmother     Obesity Paternal Aunt     Diabetes Paternal Aunt     Obesity Paternal Uncle     Hypertension Paternal Uncle     Diabetes Paternal Uncle     Hyperlipidemia Paternal Uncle     Diabetes Paternal Grandmother     Hyperlipidemia Paternal Grandmother     Hypertension  Paternal Grandmother     Cancer Paternal Grandmother 62        ovarian    Melanoma Neg Hx     Psoriasis Neg Hx     Lupus Neg Hx      Social History     Tobacco Use    Smoking status: Every Day     Types: Cigarettes    Smokeless tobacco: Current   Substance Use Topics    Alcohol use: No    Drug use: No       Current Outpatient Medications   Medication Sig    benzonatate (TESSALON) 200 MG capsule Take 1 capsule (200 mg total) by mouth 3 (three) times daily as needed for Cough.    ibuprofen (ADVIL,MOTRIN) 800 MG tablet Take 1 tablet (800 mg total) by mouth every 6 (six) hours as needed for Pain.    oxyCODONE-acetaminophen (PERCOCET) 5-325 mg per tablet Take 1 tablet by mouth every 6 (six) hours as needed for Pain.    sulfamethoxazole-trimethoprim 800-160mg (BACTRIM DS) 800-160 mg Tab Take 1 tablet by mouth 2 (two) times daily. for 10 days    cetirizine (ZYRTEC) 10 MG tablet Take 1 tablet (10 mg total) by mouth every evening. (Patient not taking: Reported on 11/23/2022)    fluticasone propionate (FLONASE) 50 mcg/actuation nasal spray 2 sprays (100 mcg total) by Each Nostril route once daily. (Patient not taking: Reported on 11/23/2022)    naproxen (NAPROSYN) 500 MG tablet Take 1 tablet (500 mg total) by mouth 2 (two) times daily with meals. (Patient not taking: Reported on 11/23/2022)    ondansetron (ZOFRAN-ODT) 4 MG TbDL Take 1 tablet (4 mg total) by mouth every 8 (eight) hours as needed (nausea). (Patient not taking: Reported on 11/23/2022)     No current facility-administered medications for this visit.         Review of Systems:  Review of Systems   Constitutional:  Negative for fever.   Cardiovascular:  Negative for chest pain.   Gastrointestinal:  Negative for abdominal pain, nausea and vomiting.   Genitourinary:  Negative for pelvic pain.   Neurological:  Negative for headaches.        OBJECTIVE:     Physical Exam:  Physical Exam  Vitals reviewed.   Constitutional:       General: She is not in acute distress.      Appearance: Normal appearance. She is well-developed. She is not ill-appearing or toxic-appearing.   HENT:      Head: Normocephalic and atraumatic.      Nose: Nose normal.   Eyes:      Extraocular Movements: Extraocular movements intact.      Conjunctiva/sclera: Conjunctivae normal.   Cardiovascular:      Rate and Rhythm: Normal rate.   Pulmonary:      Effort: Pulmonary effort is normal. No respiratory distress.   Abdominal:      Palpations: Abdomen is soft. There is no mass.      Tenderness: There is no abdominal tenderness. There is no guarding or rebound.   Genitourinary:     Vagina: Normal. No vaginal discharge or bleeding.      Cervix: No cervical motion tenderness.      Uterus: Not enlarged and not tender.       Adnexa:         Right: No mass, tenderness or fullness.          Left: No mass, tenderness or fullness.        Comments: Minimal swelling to left labia. No erythema or warmth. Word catheter in place.  Musculoskeletal:         General: Normal range of motion.      Cervical back: Normal range of motion.   Skin:     General: Skin is warm and dry.   Neurological:      Mental Status: She is alert. Mental status is at baseline.   Psychiatric:         Mood and Affect: Mood normal.         Behavior: Behavior normal.         Thought Content: Thought content normal.           ASSESSMENT:       ICD-10-CM ICD-9-CM    1. Infected cyst of Bartholin's gland duct  N75.0 616.2 Ambulatory referral/consult to Gynecology             Plan:      -- Minimal residual swelling to left vulva. Word catheter in place. Discussed rationale for word catheter and recommend leaving it in place if it is bearable. Can consider removal sooner if becomes more bothersome.        Julia Dobbs MD

## 2023-12-26 NOTE — ED PROVIDER NOTES
Encounter Date: 12/26/2023       History     Chief Complaint   Patient presents with    Abscess     Pt reports she was seen Friday to have a boil lanced near her vagina. Pt reports burning with urination and pain to the area with bleeding.      18-year-old female presented emergency department for concerns status post drainage of Bartholin cyst on antibiotics.  Patient is reporting persistent pain.  She reports pain when sitting down.  She notes that the area burns when she urinates and urine lands on it.  She is also noting persistent foul-smelling discharge from the abscess drainage and blood.  Patient denies any pain with actual urination, suprapubic pain, flank pain, abdominal pain.  She denies any nausea vomiting or fevers at this time.  Patient has also started her.  Patient has no other complaints at this time.    The history is provided by the patient.     Review of patient's allergies indicates:  No Known Allergies  Past Medical History:   Diagnosis Date    Proteinuria, postural 2012    benign     Past Surgical History:   Procedure Laterality Date    COLONOSCOPY N/A 8/6/2021    Procedure: COLONOSCOPY;  Surgeon: Constantino Shea MD;  Location: [facility] ([address] [place] [address]);  Service: Endoscopy;  Laterality: N/A;    ESOPHAGOGASTRODUODENOSCOPY N/A 8/6/2021    Procedure: (EGD);  Surgeon: Constantino Shea MD;  [facility]  Service: Endoscopy;  Laterality: N/A;  covid test 8/3 [facility]     Family History   Problem Relation Age of Onset    Sleep apnea Mother     Hypertension Mother     Mitral valve prolapse Mother     Alcohol abuse Mother     Drug abuse Mother     Alcohol abuse Father     Drug abuse Father     Mental illness Father     Congenital heart disease Sister     Learning disabilities Brother     Diabetes Maternal Grandmother     Ovarian cancer Maternal Grandmother     Hypertension Maternal Grandmother     Hyperlipidemia Maternal Grandmother     Obesity Paternal Aunt     Diabetes Paternal Aunt      Obesity Paternal Uncle     Hypertension Paternal Uncle     Diabetes Paternal Uncle     Hyperlipidemia Paternal Uncle     Diabetes Paternal Grandmother     Hyperlipidemia Paternal Grandmother     Hypertension Paternal Grandmother     Cancer Paternal Grandmother 62        ovarian    Melanoma Neg Hx     Psoriasis Neg Hx     Lupus Neg Hx      Social History     Tobacco Use    Smoking status: Every Day     Types: Cigarettes    Smokeless tobacco: Current   Substance Use Topics    Alcohol use: No    Drug use: No       Physical Exam     Initial Vitals [12/26/23 0223]   BP Pulse Resp Temp SpO2   131/70 79 18 98.6 °F (37 °C) 99 %      MAP       --         Physical Exam    Nursing note and vitals reviewed.  Constitutional: She appears well-developed and well-nourished.   HENT:   Head: Normocephalic and atraumatic.   Eyes: Pupils are equal, round, and reactive to light.   Cardiovascular:  Normal rate and regular rhythm.           Abdominal: She exhibits no distension. There is no abdominal tenderness.   Genitourinary:    Genitourinary Comments: From menstrual cycle  Cyst actively draining with elise catheter in place  Now worsening erythema or edema noted     Musculoskeletal:         General: Normal range of motion.     Neurological: She is alert and oriented to person, place, and time. No cranial nerve deficit or sensory deficit.   Skin: Skin is warm and dry.         ED Course   Procedures  Labs Reviewed - No data to display       Imaging Results    None          Medications - No data to display  Medical Decision Making  18-year-old female presenting to the emergency department for evaluation of persistent pain with blood and drainage from cyst drained 4 days ago.  At the time assessment patient is alert oriented in no acute distress.  Patient presenting with persistent pain and drainage.  On examination no worsening erythema word catheter in place actively draining.  Pain with urination is secondary to pain hitting open  wound.  On examination abdomen is nondistended nontender.  At this time discussed pain control.  Patient is stable at this time.  Patient discharged return precautions discussed and understood instructed to follow up with Gynecology and prescribed as needed pain medication.  Patient agreeable with plan.                                      Clinical Impression:  Final diagnoses:  [N75.0] Bartholin cyst (Primary)  [Z51.89] Wound check, abscess          ED Disposition Condition    Discharge Stable          ED Prescriptions       Medication Sig Dispense Start Date End Date Auth. Provider    oxyCODONE-acetaminophen (PERCOCET) 5-325 mg per tablet Take 1 tablet by mouth every 6 (six) hours as needed for Pain. 12 tablet 12/26/2023 -- Honorio Guy MD          Follow-up Information       Follow up With Specialties Details Why Contact Info    Maggie Curtis MD Pediatrics Go in 1 week  [address] [facility]  [place] [address]  [phone]      [facility] - Emergency Dept Emergency Medicine Go to  If symptoms worsen [address] [facility] [address] [phone]             Nayan Varela MD  Resident  12/26/23 1548

## 2023-12-26 NOTE — DISCHARGE INSTRUCTIONS
Gynecology today to schedule an appointment for evaluation.  Continue antibiotics as previously prescribed.  Continue to alternate between Tylenol Motrin for pain control.  Take Percocet when pain is severe as needed as prescribed.    Return to emergency department if you develop worsening pain, nausea vomiting unable to tolerate oral intake, fever, redness and swelling extending further or any other new or concerning symptoms.

## 2023-12-29 ENCOUNTER — OFFICE VISIT (OUTPATIENT)
Dept: OBSTETRICS AND GYNECOLOGY | Facility: CLINIC | Age: 18
End: 2023-12-29
Payer: COMMERCIAL

## 2023-12-29 VITALS
DIASTOLIC BLOOD PRESSURE: 68 MMHG | SYSTOLIC BLOOD PRESSURE: 120 MMHG | HEIGHT: 67 IN | BODY MASS INDEX: 17.58 KG/M2 | WEIGHT: 112 LBS

## 2023-12-29 DIAGNOSIS — N75.1 BARTHOLIN'S GLAND ABSCESS: Primary | ICD-10-CM

## 2023-12-29 PROCEDURE — 99213 PR OFFICE/OUTPT VISIT, EST, LEVL III, 20-29 MIN: ICD-10-PCS | Mod: S$GLB,,, | Performed by: STUDENT IN AN ORGANIZED HEALTH CARE EDUCATION/TRAINING PROGRAM

## 2023-12-29 PROCEDURE — 3008F BODY MASS INDEX DOCD: CPT | Mod: CPTII,S$GLB,, | Performed by: STUDENT IN AN ORGANIZED HEALTH CARE EDUCATION/TRAINING PROGRAM

## 2023-12-29 PROCEDURE — 1159F PR MEDICATION LIST DOCUMENTED IN MEDICAL RECORD: ICD-10-PCS | Mod: CPTII,S$GLB,, | Performed by: STUDENT IN AN ORGANIZED HEALTH CARE EDUCATION/TRAINING PROGRAM

## 2023-12-29 PROCEDURE — 3008F PR BODY MASS INDEX (BMI) DOCUMENTED: ICD-10-PCS | Mod: CPTII,S$GLB,, | Performed by: STUDENT IN AN ORGANIZED HEALTH CARE EDUCATION/TRAINING PROGRAM

## 2023-12-29 PROCEDURE — 3074F SYST BP LT 130 MM HG: CPT | Mod: CPTII,S$GLB,, | Performed by: STUDENT IN AN ORGANIZED HEALTH CARE EDUCATION/TRAINING PROGRAM

## 2023-12-29 PROCEDURE — 3078F DIAST BP <80 MM HG: CPT | Mod: CPTII,S$GLB,, | Performed by: STUDENT IN AN ORGANIZED HEALTH CARE EDUCATION/TRAINING PROGRAM

## 2023-12-29 PROCEDURE — 1159F MED LIST DOCD IN RCRD: CPT | Mod: CPTII,S$GLB,, | Performed by: STUDENT IN AN ORGANIZED HEALTH CARE EDUCATION/TRAINING PROGRAM

## 2023-12-29 PROCEDURE — 99213 OFFICE O/P EST LOW 20 MIN: CPT | Mod: S$GLB,,, | Performed by: STUDENT IN AN ORGANIZED HEALTH CARE EDUCATION/TRAINING PROGRAM

## 2023-12-29 PROCEDURE — 99999 PR PBB SHADOW E&M-EST. PATIENT-LVL III: CPT | Mod: PBBFAC,,, | Performed by: STUDENT IN AN ORGANIZED HEALTH CARE EDUCATION/TRAINING PROGRAM

## 2023-12-29 PROCEDURE — 3074F PR MOST RECENT SYSTOLIC BLOOD PRESSURE < 130 MM HG: ICD-10-PCS | Mod: CPTII,S$GLB,, | Performed by: STUDENT IN AN ORGANIZED HEALTH CARE EDUCATION/TRAINING PROGRAM

## 2023-12-29 PROCEDURE — 3078F PR MOST RECENT DIASTOLIC BLOOD PRESSURE < 80 MM HG: ICD-10-PCS | Mod: CPTII,S$GLB,, | Performed by: STUDENT IN AN ORGANIZED HEALTH CARE EDUCATION/TRAINING PROGRAM

## 2023-12-29 PROCEDURE — 99999 PR PBB SHADOW E&M-EST. PATIENT-LVL III: ICD-10-PCS | Mod: PBBFAC,,, | Performed by: STUDENT IN AN ORGANIZED HEALTH CARE EDUCATION/TRAINING PROGRAM

## 2023-12-29 NOTE — PROGRESS NOTES
History & Physical  Gynecology      SUBJECTIVE:     Chief Complaint: cater removal        History of Present Illness:  Alessandro Moeller is a 18 y.o. F who presents for word catheter removal. Has had it in place for 1 week s/p I&D of Bartholin gland. Doing well overall. Finished antibiotics. Pain significantly improved, just uncomfortable from catheter. No fever.      Review of patient's allergies indicates:  No Known Allergies    Past Medical History:   Diagnosis Date    Proteinuria, postural     benign     Past Surgical History:   Procedure Laterality Date    COLONOSCOPY N/A 2021    Procedure: COLONOSCOPY;  Surgeon: Constantino Shea MD;  [facility]  Service: Endoscopy;  Laterality: N/A;    ESOPHAGOGASTRODUODENOSCOPY N/A 2021    Procedure: (EGD);  Surgeon: Constantino Shea MD;  Location: [facility] ([facility]);  Service: Endoscopy;  Laterality: N/A;  covid test 8/3 [facility]     OB History          0    Para   0    Term   0       0    AB   0    Living   0         SAB   0    IAB   0    Ectopic   0    Multiple   0    Live Births   0               Family History   Problem Relation Age of Onset    Sleep apnea Mother     Hypertension Mother     Mitral valve prolapse Mother     Alcohol abuse Mother     Drug abuse Mother     Alcohol abuse Father     Drug abuse Father     Mental illness Father     Congenital heart disease Sister     Learning disabilities Brother     Diabetes Maternal Grandmother     Ovarian cancer Maternal Grandmother     Hypertension Maternal Grandmother     Hyperlipidemia Maternal Grandmother     Obesity Paternal Aunt     Diabetes Paternal Aunt     Obesity Paternal Uncle     Hypertension Paternal Uncle     Diabetes Paternal Uncle     Hyperlipidemia Paternal Uncle     Diabetes Paternal Grandmother     Hyperlipidemia Paternal Grandmother     Hypertension Paternal Grandmother     Cancer Paternal Grandmother 62        ovarian    Melanoma Neg Hx     Psoriasis Neg Hx      Lupus Neg Hx      Social History     Tobacco Use    Smoking status: Former     Types: Vaping with nicotine    Smokeless tobacco: Current   Substance Use Topics    Alcohol use: No    Drug use: No       Current Outpatient Medications   Medication Sig    ibuprofen (ADVIL,MOTRIN) 800 MG tablet Take 1 tablet (800 mg total) by mouth every 6 (six) hours as needed for Pain.    oxyCODONE-acetaminophen (PERCOCET) 5-325 mg per tablet Take 1 tablet by mouth every 6 (six) hours as needed for Pain.    sulfamethoxazole-trimethoprim 800-160mg (BACTRIM DS) 800-160 mg Tab Take 1 tablet by mouth 2 (two) times daily. for 10 days     No current facility-administered medications for this visit.         Review of Systems:  Review of Systems   Constitutional:  Negative for fever.   Genitourinary:  Negative for vaginal pain.      OBJECTIVE:     Physical Exam:  Physical Exam  Vitals reviewed.   Constitutional:       General: She is not in acute distress.     Appearance: She is well-developed. She is not diaphoretic.   HENT:      Head: Normocephalic and atraumatic.      Nose: Nose normal.   Eyes:      Extraocular Movements: Extraocular movements intact.      Conjunctiva/sclera: Conjunctivae normal.   Cardiovascular:      Rate and Rhythm: Normal rate.   Pulmonary:      Effort: Pulmonary effort is normal. No respiratory distress.   Abdominal:      Palpations: Abdomen is soft.      Tenderness: There is no abdominal tenderness.   Genitourinary:     Comments: Minimal swelling, no drainage.  Musculoskeletal:         General: No tenderness. Normal range of motion.      Cervical back: Normal range of motion.   Skin:     General: Skin is warm and dry.   Neurological:      Mental Status: She is alert and oriented to person, place, and time.   Psychiatric:         Behavior: Behavior normal.         Thought Content: Thought content normal.         Judgment: Judgment normal.       ASSESSMENT:     No diagnosis found.       Plan:      -- Word catheter  removed. Wound healthy and healing appropriately.  -- RTC for annual WWE or sooner, PRN.      Julia Dobbs MD

## 2024-04-15 ENCOUNTER — PATIENT MESSAGE (OUTPATIENT)
Dept: PEDIATRICS | Facility: CLINIC | Age: 19
End: 2024-04-15
Payer: COMMERCIAL

## 2024-04-24 ENCOUNTER — HOSPITAL ENCOUNTER (EMERGENCY)
Facility: HOSPITAL | Age: 19
Discharge: HOME | End: 2024-04-25
Attending: EMERGENCY MEDICINE
Payer: COMMERCIAL

## 2024-04-24 DIAGNOSIS — K62.89 RECTAL PAIN: Primary | ICD-10-CM

## 2024-04-24 DIAGNOSIS — K64.4 EXTERNAL HEMORRHOID: ICD-10-CM

## 2024-04-24 LAB
B-HCG UR QL: NEGATIVE
CTP QC/QA: YES

## 2024-04-24 PROCEDURE — 81025 URINE PREGNANCY TEST: CPT | Mod: ER | Performed by: EMERGENCY MEDICINE

## 2024-04-24 PROCEDURE — 99285 EMERGENCY DEPT VISIT HI MDM: CPT | Mod: 25,ER

## 2024-04-24 PROCEDURE — 81025 URINE PREGNANCY TEST: CPT | Mod: ER

## 2024-04-24 RX ORDER — KETOROLAC TROMETHAMINE 30 MG/ML
15 INJECTION, SOLUTION INTRAMUSCULAR; INTRAVENOUS
Status: COMPLETED | OUTPATIENT
Start: 2024-04-25 | End: 2024-04-25

## 2024-04-25 VITALS
WEIGHT: 113 LBS | TEMPERATURE: 99 F | DIASTOLIC BLOOD PRESSURE: 71 MMHG | HEIGHT: 66 IN | SYSTOLIC BLOOD PRESSURE: 120 MMHG | HEART RATE: 92 BPM | OXYGEN SATURATION: 100 % | RESPIRATION RATE: 18 BRPM | BODY MASS INDEX: 18.16 KG/M2

## 2024-04-25 LAB
ALBUMIN SERPL-MCNC: 4.4 G/DL (ref 3.3–5.5)
ALLENS TEST: ABNORMAL
ALP SERPL-CCNC: 52 U/L (ref 42–141)
BILIRUB SERPL-MCNC: 0.6 MG/DL (ref 0.2–1.6)
BUN SERPL-MCNC: 8 MG/DL (ref 7–22)
CALCIUM SERPL-MCNC: 10.7 MG/DL (ref 8–10.3)
CHLORIDE SERPL-SCNC: 103 MMOL/L (ref 98–108)
CREAT SERPL-MCNC: 0.7 MG/DL (ref 0.6–1.2)
GLUCOSE SERPL-MCNC: 99 MG/DL (ref 73–118)
HCO3 UR-SCNC: 22 MMOL/L (ref 24–28)
HCT, POC: NORMAL
HGB, POC: NORMAL (ref 14–18)
LDH SERPL L TO P-CCNC: 0.97 MMOL/L (ref 0.5–2.2)
MCH, POC: NORMAL
MCHC, POC: NORMAL
MCV, POC: NORMAL
MPV, POC: NORMAL
PCO2 BLDA: 39.3 MMHG (ref 35–45)
PH SMN: 7.36 [PH] (ref 7.35–7.45)
PO2 BLDA: 29 MMHG (ref 40–60)
POC ALT (SGPT): 16 U/L (ref 10–47)
POC AST (SGOT): 23 U/L (ref 11–38)
POC BE: -3 MMOL/L
POC PLATELET COUNT: NORMAL
POC SATURATED O2: 53 % (ref 95–100)
POC TCO2: 23 MMOL/L (ref 24–29)
POC TCO2: 25 MMOL/L (ref 18–33)
POTASSIUM BLD-SCNC: 4 MMOL/L (ref 3.6–5.1)
PROTEIN, POC: 8.3 G/DL (ref 6.4–8.1)
RBC, POC: NORMAL
RDW, POC: NORMAL
SAMPLE: ABNORMAL
SITE: ABNORMAL
SODIUM BLD-SCNC: 142 MMOL/L (ref 128–145)
WBC, POC: NORMAL

## 2024-04-25 PROCEDURE — 80053 COMPREHEN METABOLIC PANEL: CPT | Mod: ER

## 2024-04-25 PROCEDURE — 96375 TX/PRO/DX INJ NEW DRUG ADDON: CPT | Mod: 59,ER

## 2024-04-25 PROCEDURE — 85025 COMPLETE CBC W/AUTO DIFF WBC: CPT | Mod: ER

## 2024-04-25 PROCEDURE — 96374 THER/PROPH/DIAG INJ IV PUSH: CPT | Mod: 59,ER

## 2024-04-25 PROCEDURE — 63600175 PHARM REV CODE 636 W HCPCS: Mod: JZ,JG,ER | Performed by: EMERGENCY MEDICINE

## 2024-04-25 PROCEDURE — 25500020 PHARM REV CODE 255: Mod: ER | Performed by: EMERGENCY MEDICINE

## 2024-04-25 PROCEDURE — 83605 ASSAY OF LACTIC ACID: CPT | Mod: ER

## 2024-04-25 PROCEDURE — 25000003 PHARM REV CODE 250: Mod: ER | Performed by: EMERGENCY MEDICINE

## 2024-04-25 RX ORDER — MUPIROCIN 20 MG/G
OINTMENT TOPICAL 2 TIMES DAILY
Qty: 22 G | Refills: 0 | Status: SHIPPED | OUTPATIENT
Start: 2024-04-25 | End: 2024-05-05

## 2024-04-25 RX ORDER — DOCUSATE SODIUM 100 MG/1
100 CAPSULE, LIQUID FILLED ORAL 2 TIMES DAILY
Qty: 60 CAPSULE | Refills: 0 | Status: SHIPPED | OUTPATIENT
Start: 2024-04-25 | End: 2024-05-25

## 2024-04-25 RX ORDER — LIDOCAINE HYDROCHLORIDE 20 MG/ML
JELLY TOPICAL
Status: COMPLETED | OUTPATIENT
Start: 2024-04-25 | End: 2024-04-25

## 2024-04-25 RX ORDER — AMOXICILLIN AND CLAVULANATE POTASSIUM 875; 125 MG/1; MG/1
1 TABLET, FILM COATED ORAL 2 TIMES DAILY
Qty: 20 TABLET | Refills: 0 | Status: SHIPPED | OUTPATIENT
Start: 2024-04-25 | End: 2024-05-05

## 2024-04-25 RX ORDER — MORPHINE SULFATE 4 MG/ML
4 INJECTION, SOLUTION INTRAMUSCULAR; INTRAVENOUS
Status: COMPLETED | OUTPATIENT
Start: 2024-04-25 | End: 2024-04-25

## 2024-04-25 RX ORDER — KETOROLAC TROMETHAMINE 10 MG/1
10 TABLET, FILM COATED ORAL EVERY 6 HOURS PRN
Qty: 12 TABLET | Refills: 0 | Status: SHIPPED | OUTPATIENT
Start: 2024-04-25 | End: 2024-04-28

## 2024-04-25 RX ADMIN — MORPHINE SULFATE 4 MG: 4 INJECTION, SOLUTION INTRAMUSCULAR; INTRAVENOUS at 12:04

## 2024-04-25 RX ADMIN — LIDOCAINE HYDROCHLORIDE 10 ML: 20 JELLY TOPICAL at 12:04

## 2024-04-25 RX ADMIN — IOHEXOL 15 ML: 350 INJECTION, SOLUTION INTRAVENOUS at 01:04

## 2024-04-25 RX ADMIN — IOHEXOL 75 ML: 350 INJECTION, SOLUTION INTRAVENOUS at 01:04

## 2024-04-25 RX ADMIN — KETOROLAC TROMETHAMINE 15 MG: 30 INJECTION, SOLUTION INTRAMUSCULAR at 12:04

## 2024-04-25 NOTE — ED PROVIDER NOTES
Encounter Date: 4/24/2024       History     Chief Complaint   Patient presents with    Abscess     C/O ABSCESS BETWEEN BUTTOCK X 2 DAYS, UNABLE TO VISUALIZE AT TRIAGE     18 y.o. female presents to ED c/o acute rectal swelling that began 2 days ago.  She reports rectal pain the following day that has progressively worsened.  She denies fever, rectal bleeding, constipation, vaginal pain/swelling or vaginal discharge.  She denies any attempted treatment.      The history is provided by the patient.     Review of patient's allergies indicates:  No Known Allergies  Past Medical History:   Diagnosis Date    Proteinuria, postural 2012    benign     Past Surgical History:   Procedure Laterality Date    COLONOSCOPY N/A 8/6/2021    Procedure: COLONOSCOPY;  Surgeon: Constantino Shea MD;  [facility]  Service: Endoscopy;  Laterality: N/A;    ESOPHAGOGASTRODUODENOSCOPY N/A 8/6/2021    Procedure: (EGD);  Surgeon: Constantino Shea MD;  Location: [facility] ([facility]);  Service: Endoscopy;  Laterality: N/A;  covid test 8/3 [facility]     Family History   Problem Relation Name Age of Onset    Sleep apnea Mother      Hypertension Mother      Mitral valve prolapse Mother      Alcohol abuse Mother      Drug abuse Mother      Alcohol abuse Father      Drug abuse Father      Mental illness Father      Congenital heart disease Sister      Learning disabilities Brother      Diabetes Maternal Grandmother      Ovarian cancer Maternal Grandmother      Hypertension Maternal Grandmother      Hyperlipidemia Maternal Grandmother      Obesity Paternal Aunt      Diabetes Paternal Aunt      Obesity Paternal Uncle      Hypertension Paternal Uncle      Diabetes Paternal Uncle      Hyperlipidemia Paternal Uncle      Diabetes Paternal Grandmother      Hyperlipidemia Paternal Grandmother      Hypertension Paternal Grandmother      Cancer Paternal Grandmother  62        ovarian    Melanoma Neg Hx      Psoriasis Neg Hx      Lupus Neg Hx        Social History     Tobacco Use    Smoking status: Former     Types: Vaping with nicotine    Smokeless tobacco: Current   Substance Use Topics    Alcohol use: No    Drug use: No     Review of Systems   Constitutional:  Negative for fever.   Gastrointestinal:  Positive for rectal pain. Negative for abdominal pain, anal bleeding, constipation, diarrhea, nausea and vomiting.   Genitourinary:  Negative for decreased urine volume, dysuria, frequency, genital sores, hematuria, vaginal discharge and vaginal pain.       Physical Exam     Initial Vitals [04/24/24 2212]   BP Pulse Resp Temp SpO2   137/87 99 18 99 °F (37.2 °C) 100 %      MAP       --         Physical Exam    Nursing note and vitals reviewed.  Constitutional: She appears well-developed and well-nourished. She is not diaphoretic. No distress.   HENT:   Head: Normocephalic and atraumatic.   Eyes: Conjunctivae are normal. Pupils are equal, round, and reactive to light.   Neck: Neck supple.   Normal range of motion.  Cardiovascular:  Normal rate and intact distal pulses.           Pulmonary/Chest: No accessory muscle usage. No tachypnea. No respiratory distress.   Abdominal: She exhibits no distension. There is no abdominal tenderness.   Genitourinary: Rectum:      Tenderness and external hemorrhoid present.      No anal fissure, internal hemorrhoid or abnormal anal tone.   There is no tenderness on the right labia. There is no tenderness on the left labia.    Genitourinary Comments: There is no labial pain/swelling there is no swelling or pain in the perineum.         Musculoskeletal:         General: No tenderness. Normal range of motion.      Cervical back: Normal range of motion and neck supple.     Neurological: She is alert and oriented to person, place, and time. She has normal strength. Gait normal. GCS eye subscore is 4. GCS verbal subscore is 5. GCS motor subscore is 6.   Skin: Skin is warm. Capillary refill takes less than 2 seconds.   Psychiatric:  She has a normal mood and affect.         ED Course   Procedures  Labs Reviewed   ISTAT PROCEDURE - Abnormal; Notable for the following components:       Result Value    POC PO2 29 (*)     POC HCO3 22.0 (*)     POC BE -3 (*)     POC TCO2 23 (*)     All other components within normal limits   POCT CMP - Abnormal; Notable for the following components:    Calcium, POC 10.7 (*)     Protein, POC 8.3 (*)     All other components within normal limits   POCT URINE PREGNANCY   POCT CBC          Imaging Results              CT Pelvis With IV Contrast NO Oral Contrast, Rectal Contrast (Final result)  Result time 04/25/24 01:32:36      Final result by Fransisco Clark MD (04/25/24 01:32:36)                   Impression:      Cystic structures in the bilateral labia, left larger than the right, measuring 2.0 cm on the left and 1.4 cm on the right, similar when compared to images from prior study 07/06/2022. as described on prior CT report 07/06/2022, findings are suspected to be related to bilateral Bartholin's gland cysts.      Electronically signed by: Fransisco Clark MD  Date:    04/25/2024  Time:    01:32               Narrative:    EXAMINATION:  CT PELVIS WITH IV CONTRAST    CLINICAL HISTORY:  Anal/rectal abscess;    TECHNIQUE:  Low dose axial images, sagittal and coronal reformations were obtained from the iliac crests to the pubic symphysis after the administration of 75 cc Omnipaque 350 intravenous contrast.  15 mL Omnipaque 350 rectal contrast administered via balloon tipped rectal tube.  No oral contrast administered.    COMPARISON:  07/06/2022.    FINDINGS:  BOWEL/MESENTERY: Good distension of the rectum and sigmoid colon with contrast.  No rectal wall thickening or perirectal inflammatory change.  No perirectal/perianal abscess identified.    UTERUS: Unremarkable.    URINARY BLADDER: Unremarkable.    VASCULATURE: Visualized vasculature appears unremarkable.    BONES: No acute fracture or bony destructive  process.    EXTRAPERITONEAL SOFT TISSUES: Cystic structures in the bilateral labia, left larger than the right, measuring 2.0 cm on the left and 1.4 cm on the right, similar when compared to images from prior study 07/06/2022.    OTHER: No pelvic adenopathy, free fluid, or mass.                                       Medications   ketorolac injection 15 mg (15 mg Intravenous Given 4/25/24 0000)   LIDOcaine HCl 2% urojet (10 mLs Mucous Membrane Given 4/25/24 0027)   morphine injection 4 mg (4 mg Intravenous Given 4/25/24 0027)   iohexoL (OMNIPAQUE 350) injection 100 mL (75 mLs Intravenous Given 4/25/24 0102)   iohexoL (OMNIPAQUE 350) injection 15 mL (15 mLs Rectal Given 4/25/24 0102)     Medical Decision Making  Amount and/or Complexity of Data Reviewed  Labs: ordered.  Radiology: ordered.    Risk  OTC drugs.  Prescription drug management.                    Chart Review     Latest Reference Range & Units Most Recent 06/26/21 11:10 07/20/21 10:06 07/06/22 12:44   Hemoglobin 12.0 - 16.0 g/dL 8.2 (L)  7/6/22 12:44 9.4 (L) 9.2 (L) 8.2 (L)   Hematocrit 37.0 - 48.5 % 29.5 (L)  7/6/22 12:44 34.2 (L) 31.4 (L) 29.5 (L)   (L): Data is abnormally low    Labs Reviewed  Pertinent lab and imaging findings include:   There is no leukocytosis, H and H 9.2 and 29.0 (stable compared to previous), platelets 404, lactic acid within normal limits, CMP unremarkable, UPT negative, CT pelvis negative for rectal wall thickening or perirectal inflammatory changes or findings to suggest perirectal/perianal abscess.    Admission on 04/24/2024, Discharged on 04/25/2024   Component Date Value Ref Range Status    POC Preg Test, Ur 04/24/2024 Negative  Negative Final     Acceptable 04/24/2024 Yes   Final    POC PH 04/25/2024 7.356  7.35 - 7.45 Final    POC PCO2 04/25/2024 39.3  35 - 45 mmHg Final    POC PO2 04/25/2024 29 (LL)  40 - 60 mmHg Final    POC HCO3 04/25/2024 22.0 (L)  24 - 28 mmol/L Final    POC BE 04/25/2024 -3 (L)  -2 to  2 mmol/L Final    POC SATURATED O2 04/25/2024 53  95 - 100 % Final    POC Lactate 04/25/2024 0.97  0.5 - 2.2 mmol/L Final    POC TCO2 04/25/2024 23 (L)  24 - 29 mmol/L Final    Sample 04/25/2024 VENOUS   Final    Site 04/25/2024 Other   Final    Allens Test 04/25/2024 N/A   Final    Albumin, POC 04/25/2024 4.4  3.3 - 5.5 g/dL Final    Alkaline Phosphatase, POC 04/25/2024 52  42 - 141 U/L Final    ALT (SGPT), POC 04/25/2024 16  10 - 47 U/L Final    AST (SGOT), POC 04/25/2024 23  11 - 38 U/L Final    POC BUN 04/25/2024 8  7 - 22 mg/dL Final    Calcium, POC 04/25/2024 10.7 (H)  8.0 - 10.3 mg/dL Final    POC Chloride 04/25/2024 103  98 - 108 mmol/L Final    POC Creatinine 04/25/2024 0.7  0.6 - 1.2 mg/dL Final    POC Glucose 04/25/2024 99  73 - 118 mg/dL Final    POC Potassium 04/25/2024 4.0  3.6 - 5.1 mmol/L Final    POC Sodium 04/25/2024 142  128 - 145 mmol/L Final    Bilirubin, POC 04/25/2024 0.6  0.2 - 1.6 mg/dL Final    POC TCO2 04/25/2024 25  18 - 33 mmol/L Final    Protein, POC 04/25/2024 8.3 (H)  6.4 - 8.1 g/dL Final        Imaging Reviewed    Imaging Results              CT Pelvis With IV Contrast NO Oral Contrast, Rectal Contrast (Final result)  Result time 04/25/24 01:32:36      Final result by Fransisco Clark MD (04/25/24 01:32:36)                   Impression:      Cystic structures in the bilateral labia, left larger than the right, measuring 2.0 cm on the left and 1.4 cm on the right, similar when compared to images from prior study 07/06/2022. as described on prior CT report 07/06/2022, findings are suspected to be related to bilateral Bartholin's gland cysts.      Electronically signed by: Fransisco Clark MD  Date:    04/25/2024  Time:    01:32               Narrative:    EXAMINATION:  CT PELVIS WITH IV CONTRAST    CLINICAL HISTORY:  Anal/rectal abscess;    TECHNIQUE:  Low dose axial images, sagittal and coronal reformations were obtained from the iliac crests to the pubic symphysis after the  administration of 75 cc Omnipaque 350 intravenous contrast.  15 mL Omnipaque 350 rectal contrast administered via balloon tipped rectal tube.  No oral contrast administered.    COMPARISON:  07/06/2022.    FINDINGS:  BOWEL/MESENTERY: Good distension of the rectum and sigmoid colon with contrast.  No rectal wall thickening or perirectal inflammatory change.  No perirectal/perianal abscess identified.    UTERUS: Unremarkable.    URINARY BLADDER: Unremarkable.    VASCULATURE: Visualized vasculature appears unremarkable.    BONES: No acute fracture or bony destructive process.    EXTRAPERITONEAL SOFT TISSUES: Cystic structures in the bilateral labia, left larger than the right, measuring 2.0 cm on the left and 1.4 cm on the right, similar when compared to images from prior study 07/06/2022.    OTHER: No pelvic adenopathy, free fluid, or mass.                                      Medications given in ED    Medications   ketorolac injection 15 mg (15 mg Intravenous Given 4/25/24 0000)   LIDOcaine HCl 2% urojet (10 mLs Mucous Membrane Given 4/25/24 0027)   morphine injection 4 mg (4 mg Intravenous Given 4/25/24 0027)   iohexoL (OMNIPAQUE 350) injection 100 mL (75 mLs Intravenous Given 4/25/24 0102)   iohexoL (OMNIPAQUE 350) injection 15 mL (15 mLs Rectal Given 4/25/24 0102)       Note was created using voice recognition software. Note may have occasional typographical errors that may not have been identified and edited despite good josh initial review prior to signing.            Medical Decision Making:   Differential Diagnosis:   Thrombosed external hemorrhoid, internal hemorrhoid, perianal/perirectal abscess, anal fissure, fecal impaction, prostatitis, proctitis, malignancy, condyloma acuminiata/rogerio, rectal gonorrhea, anorectal fistula, others    Clinical Tests:   Lab Tests: Ordered and Reviewed  Radiological Study: Ordered and Reviewed  ED Management:  Patient treated empirically for external hemorrhoids versus early  perirectal/perianal infection.  Test results, imaging results, outpatient management plan, outpatient PCP follow up and ED return precautions discussed with patient with understanding and agreement.                Clinical Impression:  Final diagnoses:  [K62.89] Rectal pain (Primary)  [K64.4] External hemorrhoid          ED Disposition Condition    Discharge Stable          ED Prescriptions       Medication Sig Dispense Start Date End Date Auth. Provider    amoxicillin-clavulanate 875-125mg (AUGMENTIN) 875-125 mg per tablet Take 1 tablet by mouth 2 (two) times daily. for 10 days 20 tablet 2024 Cruz Singer MD    mupirocin (BACTROBAN) 2 % ointment Apply topically 2 (two) times daily. for 10 days 22 g 2024 Cruz Singer MD    ketorolac (TORADOL) 10 mg tablet () Take 1 tablet (10 mg total) by mouth every 6 (six) hours as needed for Pain (take with food). 12 tablet 2024 Cruz Singer MD    hydrocortisone-pramoxine (PROCTOFOAM-HS) rectal foam Place 1 applicator rectally 2 (two) times daily. for 7 days 14 applicator 2024 Cruz Singer MD    docusate sodium (COLACE) 100 MG capsule Take 1 capsule (100 mg total) by mouth 2 (two) times daily. 60 capsule 2024 Cruz Singer MD          Follow-up Information       Follow up With Specialties Details Why Contact Info    The nearest emergency department.  Go to  As needed, If symptoms worsen     Your PCP  Call  As needed, for ongoing care              Cruz Singer MD  [phone]

## 2024-05-03 ENCOUNTER — OFFICE VISIT (OUTPATIENT)
Dept: SURGERY | Facility: CLINIC | Age: 19
End: 2024-05-03
Payer: COMMERCIAL

## 2024-05-03 VITALS
HEART RATE: 88 BPM | DIASTOLIC BLOOD PRESSURE: 77 MMHG | BODY MASS INDEX: 16.6 KG/M2 | WEIGHT: 103.31 LBS | HEIGHT: 66 IN | SYSTOLIC BLOOD PRESSURE: 115 MMHG

## 2024-05-03 DIAGNOSIS — K64.4 EXTERNAL HEMORRHOID: ICD-10-CM

## 2024-05-03 DIAGNOSIS — K62.89 RECTAL PAIN: Primary | ICD-10-CM

## 2024-05-03 PROCEDURE — 99999 PR PBB SHADOW E&M-EST. PATIENT-LVL III: CPT | Mod: PBBFAC,,, | Performed by: SURGERY

## 2024-05-03 PROCEDURE — 1159F MED LIST DOCD IN RCRD: CPT | Mod: CPTII,S$GLB,, | Performed by: SURGERY

## 2024-05-03 PROCEDURE — 3008F BODY MASS INDEX DOCD: CPT | Mod: CPTII,S$GLB,, | Performed by: SURGERY

## 2024-05-03 PROCEDURE — 3078F DIAST BP <80 MM HG: CPT | Mod: CPTII,S$GLB,, | Performed by: SURGERY

## 2024-05-03 PROCEDURE — 3074F SYST BP LT 130 MM HG: CPT | Mod: CPTII,S$GLB,, | Performed by: SURGERY

## 2024-05-03 PROCEDURE — 99203 OFFICE O/P NEW LOW 30 MIN: CPT | Mod: S$GLB,,, | Performed by: SURGERY

## 2024-05-03 RX ORDER — OXYCODONE AND ACETAMINOPHEN 5; 325 MG/1; MG/1
1 TABLET ORAL EVERY 4 HOURS PRN
Qty: 25 TABLET | Refills: 0 | OUTPATIENT
Start: 2024-05-03 | End: 2024-06-06

## 2024-05-03 NOTE — PROGRESS NOTES
19 y/o woman with history of perianal pain and tenderness.  Seen in ED and referred for f/u. She reports midline location and reports mild improvement since er visit    PE: tender area at 12 o'clock position no fluctuance, had ct scan with had cyst  but left sided.     Impression: bartholin's cyst infection vs/ perianal abscess which has not coalesced as of yet.    Plan: contiune abx from ED, add warm compresses, close f/u (<1 week), sooner or ED for worsening of symptoms

## 2024-05-08 ENCOUNTER — OFFICE VISIT (OUTPATIENT)
Dept: SURGERY | Facility: CLINIC | Age: 19
End: 2024-05-08
Payer: COMMERCIAL

## 2024-05-08 VITALS
DIASTOLIC BLOOD PRESSURE: 70 MMHG | HEART RATE: 91 BPM | BODY MASS INDEX: 16.61 KG/M2 | WEIGHT: 103.38 LBS | HEIGHT: 66 IN | SYSTOLIC BLOOD PRESSURE: 114 MMHG

## 2024-05-08 DIAGNOSIS — K62.89 RECTAL PAIN: Primary | ICD-10-CM

## 2024-05-08 PROCEDURE — 3078F DIAST BP <80 MM HG: CPT | Mod: CPTII,S$GLB,, | Performed by: SURGERY

## 2024-05-08 PROCEDURE — 99212 OFFICE O/P EST SF 10 MIN: CPT | Mod: S$GLB,,, | Performed by: SURGERY

## 2024-05-08 PROCEDURE — 1159F MED LIST DOCD IN RCRD: CPT | Mod: CPTII,S$GLB,, | Performed by: SURGERY

## 2024-05-08 PROCEDURE — 3074F SYST BP LT 130 MM HG: CPT | Mod: CPTII,S$GLB,, | Performed by: SURGERY

## 2024-05-08 PROCEDURE — 99999 PR PBB SHADOW E&M-EST. PATIENT-LVL III: CPT | Mod: PBBFAC,,, | Performed by: SURGERY

## 2024-05-08 PROCEDURE — 3008F BODY MASS INDEX DOCD: CPT | Mod: CPTII,S$GLB,, | Performed by: SURGERY

## 2024-05-08 NOTE — PROGRESS NOTES
19 y/o woman with history of perianal pain and tenderness.  Seen in ED and referred for f/u. She reports improvement since last visit.      Impression: bartholin's cyst infection vs/ perianal abscess which has not coalesced as of yet, improving    Plan: finish abx from ED, continue warm compresses, f/u prn

## 2024-05-30 ENCOUNTER — TELEPHONE (OUTPATIENT)
Dept: OBSTETRICS AND GYNECOLOGY | Facility: CLINIC | Age: 19
End: 2024-05-30
Payer: COMMERCIAL

## 2024-06-06 ENCOUNTER — HOSPITAL ENCOUNTER (EMERGENCY)
Facility: HOSPITAL | Age: 19
Discharge: HOME | End: 2024-06-06
Attending: EMERGENCY MEDICINE
Payer: COMMERCIAL

## 2024-06-06 ENCOUNTER — NURSE TRIAGE (OUTPATIENT)
Dept: ADMINISTRATIVE | Facility: CLINIC | Age: 19
End: 2024-06-06
Payer: COMMERCIAL

## 2024-06-06 VITALS
OXYGEN SATURATION: 100 % | RESPIRATION RATE: 16 BRPM | TEMPERATURE: 98 F | SYSTOLIC BLOOD PRESSURE: 121 MMHG | HEIGHT: 67 IN | HEART RATE: 64 BPM | BODY MASS INDEX: 18.21 KG/M2 | WEIGHT: 116 LBS | DIASTOLIC BLOOD PRESSURE: 74 MMHG

## 2024-06-06 DIAGNOSIS — R10.31 BILATERAL LOWER ABDOMINAL PAIN: Primary | ICD-10-CM

## 2024-06-06 DIAGNOSIS — N83.202 LEFT OVARIAN CYST: ICD-10-CM

## 2024-06-06 DIAGNOSIS — R10.32 BILATERAL LOWER ABDOMINAL PAIN: Primary | ICD-10-CM

## 2024-06-06 DIAGNOSIS — K59.00 CONSTIPATION, UNSPECIFIED CONSTIPATION TYPE: ICD-10-CM

## 2024-06-06 LAB
ALBUMIN SERPL-MCNC: 3.8 G/DL (ref 3.3–5.5)
ALP SERPL-CCNC: 41 U/L (ref 42–141)
B-HCG UR QL: NEGATIVE
BILIRUB SERPL-MCNC: 0.6 MG/DL (ref 0.2–1.6)
BILIRUBIN, POC UA: NEGATIVE
BLOOD, POC UA: ABNORMAL
BUN SERPL-MCNC: 10 MG/DL (ref 7–22)
CALCIUM SERPL-MCNC: 9.3 MG/DL (ref 8–10.3)
CHLORIDE SERPL-SCNC: 108 MMOL/L (ref 98–108)
CLARITY, POC UA: CLEAR
COLOR, POC UA: ABNORMAL
CREAT SERPL-MCNC: 0.7 MG/DL (ref 0.6–1.2)
CTP QC/QA: YES
GLUCOSE SERPL-MCNC: 88 MG/DL (ref 73–118)
GLUCOSE, POC UA: NEGATIVE
HCT, POC: NORMAL
HGB, POC: NORMAL (ref 14–18)
KETONES, POC UA: NEGATIVE
LEUKOCYTE EST, POC UA: NEGATIVE
MCH, POC: NORMAL
MCHC, POC: NORMAL
MCV, POC: NORMAL
MPV, POC: NORMAL
NITRITE, POC UA: NEGATIVE
PH UR STRIP: 7 [PH]
POC ALT (SGPT): 16 U/L (ref 10–47)
POC AST (SGOT): 24 U/L (ref 11–38)
POC PLATELET COUNT: NORMAL
POC TCO2: 28 MMOL/L (ref 18–33)
POTASSIUM BLD-SCNC: 4 MMOL/L (ref 3.6–5.1)
PROTEIN, POC UA: ABNORMAL
PROTEIN, POC: 7.2 G/DL (ref 6.4–8.1)
RBC, POC: NORMAL
RDW, POC: NORMAL
SODIUM BLD-SCNC: 138 MMOL/L (ref 128–145)
SPECIFIC GRAVITY, POC UA: 1.02
UROBILINOGEN, POC UA: 0.2 E.U./DL
WBC, POC: NORMAL

## 2024-06-06 PROCEDURE — 85025 COMPLETE CBC W/AUTO DIFF WBC: CPT | Mod: ER

## 2024-06-06 PROCEDURE — 96374 THER/PROPH/DIAG INJ IV PUSH: CPT | Mod: ER

## 2024-06-06 PROCEDURE — 80053 COMPREHEN METABOLIC PANEL: CPT | Mod: ER

## 2024-06-06 PROCEDURE — 63600175 PHARM REV CODE 636 W HCPCS: Mod: ER | Performed by: EMERGENCY MEDICINE

## 2024-06-06 PROCEDURE — 25000003 PHARM REV CODE 250: Mod: ER | Performed by: EMERGENCY MEDICINE

## 2024-06-06 PROCEDURE — 81025 URINE PREGNANCY TEST: CPT | Mod: ER | Performed by: NURSE PRACTITIONER

## 2024-06-06 PROCEDURE — 99285 EMERGENCY DEPT VISIT HI MDM: CPT | Mod: 25,ER

## 2024-06-06 PROCEDURE — 96361 HYDRATE IV INFUSION ADD-ON: CPT | Mod: ER

## 2024-06-06 PROCEDURE — 25500020 PHARM REV CODE 255: Mod: ER | Performed by: EMERGENCY MEDICINE

## 2024-06-06 PROCEDURE — 96372 THER/PROPH/DIAG INJ SC/IM: CPT | Mod: 59 | Performed by: EMERGENCY MEDICINE

## 2024-06-06 PROCEDURE — 81003 URINALYSIS AUTO W/O SCOPE: CPT | Mod: ER

## 2024-06-06 RX ORDER — ONDANSETRON HYDROCHLORIDE 2 MG/ML
4 INJECTION, SOLUTION INTRAVENOUS
Status: COMPLETED | OUTPATIENT
Start: 2024-06-06 | End: 2024-06-06

## 2024-06-06 RX ORDER — DICYCLOMINE HYDROCHLORIDE 20 MG/1
20 TABLET ORAL
Qty: 20 TABLET | Refills: 0 | Status: SHIPPED | OUTPATIENT
Start: 2024-06-06 | End: 2024-07-06

## 2024-06-06 RX ORDER — DICYCLOMINE HYDROCHLORIDE 10 MG/ML
20 INJECTION INTRAMUSCULAR
Status: COMPLETED | OUTPATIENT
Start: 2024-06-06 | End: 2024-06-06

## 2024-06-06 RX ORDER — POLYETHYLENE GLYCOL 3350 17 G/17G
17 POWDER, FOR SOLUTION ORAL DAILY
Qty: 119 G | Refills: 0 | Status: SHIPPED | OUTPATIENT
Start: 2024-06-06

## 2024-06-06 RX ORDER — SYRING-NEEDL,DISP,INSUL,0.3 ML 29 G X1/2"
296 SYRINGE, EMPTY DISPOSABLE MISCELLANEOUS
Status: COMPLETED | OUTPATIENT
Start: 2024-06-06 | End: 2024-06-06

## 2024-06-06 RX ADMIN — DICYCLOMINE HYDROCHLORIDE 20 MG: 10 INJECTION, SOLUTION INTRAMUSCULAR at 06:06

## 2024-06-06 RX ADMIN — IOHEXOL 75 ML: 350 INJECTION, SOLUTION INTRAVENOUS at 06:06

## 2024-06-06 RX ADMIN — SODIUM CHLORIDE 1000 ML: 9 INJECTION, SOLUTION INTRAVENOUS at 04:06

## 2024-06-06 RX ADMIN — MAGNESIUM CITRATE 296 ML: 1.75 LIQUID ORAL at 06:06

## 2024-06-06 RX ADMIN — ONDANSETRON 4 MG: 2 INJECTION INTRAMUSCULAR; INTRAVENOUS at 04:06

## 2024-06-06 NOTE — ED PROVIDER NOTES
"Encounter Date: 6/6/2024    SCRIBE #1 NOTE: I, Saumya Cox, am scribing for, and in the presence of,  Mallorie Juan MD. I have scribed the following portions of the note - Other sections scribed: hpi,ros,pe,mdm.       History     Chief Complaint   Patient presents with    Urinary Tract Infection     Patient reports increased urinary frequency and decreased output x2 days followed by abdominal pain and nausea.       18 y.o. female, with no pertinent PMHx, who presents to the ED with complaint of lower abdominal pain with associated nausea, vomiting, headache, and lack of appetite that began 2 days ago. Patient reports movement exacerbates pain. Patient notes passing "a lot" of flatus. Attempted treatment with tylenol to little relief. No other exacerbating or alleviating factors. Per chart review, patient visit ED on 4/24/24 for symptoms of rectal pain and discharged home with Augmentin for possible early abscess. Patient notes she Is currently on her period. Denies fever or other associated symptoms.       The history is provided by the patient. No  was used.     Review of patient's allergies indicates:  No Known Allergies  Past Medical History:   Diagnosis Date    Proteinuria, postural 2012    benign     Past Surgical History:   Procedure Laterality Date    COLONOSCOPY N/A 8/6/2021    Procedure: COLONOSCOPY;  Surgeon: Constantino Shea MD;  Location: [facility] ([address] [place] [address]);  Service: Endoscopy;  Laterality: N/A;    ESOPHAGOGASTRODUODENOSCOPY N/A 8/6/2021    Procedure: (EGD);  Surgeon: Constantino Shea MD;  Location: [facility] ([address] [place] [address]);  Service: Endoscopy;  Laterality: N/A;  covid test 8/3 [facility]     Family History   Problem Relation Name Age of Onset    Sleep apnea Mother      Hypertension Mother      Mitral valve prolapse Mother      Alcohol abuse Mother      Drug abuse Mother      Alcohol abuse Father      Drug abuse Father      Mental illness Father      Congenital heart disease Sister "      Learning disabilities Brother      Diabetes Maternal Grandmother      Ovarian cancer Maternal Grandmother      Hypertension Maternal Grandmother      Hyperlipidemia Maternal Grandmother      Obesity Paternal Aunt      Diabetes Paternal Aunt      Obesity Paternal Uncle      Hypertension Paternal Uncle      Diabetes Paternal Uncle      Hyperlipidemia Paternal Uncle      Diabetes Paternal Grandmother      Hyperlipidemia Paternal Grandmother      Hypertension Paternal Grandmother      Cancer Paternal Grandmother  62        ovarian    Melanoma Neg Hx      Psoriasis Neg Hx      Lupus Neg Hx       Social History     Tobacco Use    Smoking status: Former     Types: Vaping with nicotine    Smokeless tobacco: Current   Substance Use Topics    Alcohol use: No    Drug use: No     Review of Systems   Constitutional:  Positive for appetite change (absent). Negative for activity change, chills and fever.   HENT:  Negative for congestion, rhinorrhea, sneezing and sore throat.    Respiratory:  Negative for cough, choking, shortness of breath and wheezing.    Cardiovascular:  Negative for chest pain and palpitations.   Gastrointestinal:  Positive for abdominal pain (lower; constant), nausea and vomiting (1x). Negative for diarrhea.   Musculoskeletal:  Positive for myalgias (generalized).   Neurological:  Positive for headaches. Negative for dizziness, syncope and light-headedness.   All other systems reviewed and are negative.      Physical Exam     Initial Vitals [06/06/24 1546]   BP Pulse Resp Temp SpO2   122/74 70 18 98.3 °F (36.8 °C) 100 %      MAP       --         Physical Exam    Nursing note and vitals reviewed.  Constitutional: Vital signs are normal. She appears well-developed and well-nourished. She is cooperative. She does not appear ill. No distress.   HENT:   Head: Normocephalic and atraumatic.   Mouth/Throat: Uvula is midline and mucous membranes are normal.   Eyes: Conjunctivae and lids are normal.   Neck: Neck  supple.   Normal range of motion.  Cardiovascular:  Normal rate, regular rhythm, S1 normal, S2 normal and normal heart sounds.           No murmur heard.  Pulmonary/Chest: Effort normal and breath sounds normal. No respiratory distress. She has no wheezes.   Abdominal: Abdomen is soft. She exhibits no distension. Bowel sounds are increased. There is generalized abdominal tenderness (most tender across lower abdomen). There is no rebound and no guarding.   Musculoskeletal:         General: No tenderness or edema.      Cervical back: Normal range of motion and neck supple.     Neurological: She is alert and oriented to person, place, and time.   Skin: Skin is warm, dry and intact.   Psychiatric: She has a normal mood and affect. Her speech is normal and behavior is normal.         ED Course   Procedures  Labs Reviewed   POCT URINALYSIS W/O SCOPE - Abnormal; Notable for the following components:       Result Value    Blood, UA 3+ (*)     Protein, UA 2+ (*)     All other components within normal limits   POCT CMP - Abnormal; Notable for the following components:    Alkaline Phosphatase, POC 41 (*)     All other components within normal limits   POCT URINE PREGNANCY   POCT URINALYSIS W/O SCOPE   POCT CBC   POCT CMP          Imaging Results               CT Abdomen Pelvis With IV Contrast NO Oral Contrast (Final result)  Result time 06/06/24 18:20:22      Final result by Caleb Mckeon MD (06/06/24 18:20:22)                   Impression:      This report was flagged in Epic as abnormal.    1. Cystic structure within the left adnexa, may reflect left ovarian cyst.  If there is pain in the region, ultrasound could be performed for further evaluation.  2. Several arterial enhancing lesions throughout the hepatic parenchyma, nonspecific however suggest hemangioma.  Nonemergent outpatient MRI is recommended for further characterization as warranted.  3. Large amount of stool in the colon suggests constipation.  This may  account for slow flow through several distal small bowel loops however enteritis can present in this fashion.  Correlation is advised.  4. Cyst within the left perineum, suggesting Bartholin gland cyst.  This has decreased in size since the previous examination.  Continued follow-up as warranted.  5. Please see above for several additional findings.      Electronically signed by: Caleb Mckeon MD  Date:    06/06/2024  Time:    18:20               Narrative:    EXAMINATION:  CT ABDOMEN PELVIS WITH IV CONTRAST    CLINICAL HISTORY:  Abdominal pain, acute, nonlocalized;    TECHNIQUE:  Low dose axial images, sagittal and coronal reformations were obtained from the lung bases to the pubic symphysis following the IV administration of 75 mL of Omnipaque 350 .  Oral contrast was not given.    COMPARISON:  CT 07/06/2022, CT pelvis 04/25/2024    FINDINGS:  Images of the lower thorax are unremarkable.    Allowing for motion artifact, the liver is hypoattenuating, likely related to contrast phase however steatosis is a consideration, correlation with LFTs recommended.  There are numerous arterial enhancing lesions throughout the hepatic parenchyma, possibly reflecting flash filling hemangioma noting largest focus measures 1.3 cm.  These were not evident on the previous exam however may be more evident on current exam given bolus timing.  There are a few scattered low attenuating lesions within the hepatic parenchyma, too small for characterization.  The spleen, pancreas and adrenal glands are grossly unremarkable allowing for motion artifact.  The gallbladder is decompressed, no definite calculi seen.  The portal vein, splenic vein, SMV, celiac axis and SMA all are patent.  The stomach is decompressed without wall thickening.  No significant abdominal lymphadenopathy.    The kidneys enhance symmetrically without hydronephrosis or nephrolithiasis.  The bilateral ureters are unable to be followed in their entirety to the urinary  bladder, no definite calculi seen or secondary findings to suggest obstructive uropathy.  The urinary bladder is mildly distended without wall thickening.  The uterus and right adnexa are unremarkable.  There is a cystic structure within the left adnexa, may reflect ovarian cyst or bowel loop.  There is a small amount of fluid in the pelvis.    There is a large amount of stool in the rectum.  The large bowel is otherwise grossly unremarkable.  The terminal ileum is unremarkable.  The appendix is unremarkable.  The small bowel is remarkable for a few fluid-filled distal loops.  There are a few scattered shotty periaortic, pericaval, and mesenteric lymph nodes.    The osseous structures are intact.  No significant inguinal lymphadenopathy.    There is a vague cystic focus within the left perineum, decreased in size since CT 07/06/2023 and since examination 04/25/2024.                                       Medications   sodium chloride 0.9% bolus 1,000 mL 1,000 mL (0 mLs Intravenous Stopped 6/6/24 1752)   ondansetron injection 4 mg (4 mg Intravenous Given 6/6/24 1647)   iohexoL (OMNIPAQUE 350) injection 100 mL (75 mLs Intravenous Given 6/6/24 1804)   magnesium citrate solution 296 mL (296 mLs Oral Given 6/6/24 1848)   dicyclomine injection 20 mg (20 mg Intramuscular Given 6/6/24 1848)     Medical Decision Making  18F with no pertinent PMHx presents with complaint of lower abdominal pain with associated nausea, vomiting, headache, and lack of appetite that began 2 days ago. On exam, no fever, soft abdomen, hyperactive bowel sounds, generalized abdominal tenderness most tender across lower abdomen. In shared decision making with the patient I will order labs and imaging. IVFs and antiemetics. Work up pending at end of my shift. Care turned over to Dr. Singer.     Amount and/or Complexity of Data Reviewed  Labs: ordered. Decision-making details documented in ED Course.  Radiology: ordered.    Risk  OTC drugs.  Prescription  drug management.            Scribe Attestation:   Scribe #1: I performed the above scribed service and the documentation accurately describes the services I performed. I attest to the accuracy of the note.                   Medical Decision Making:   History:   Old Medical Records: I decided to obtain old medical records.           I, Dr. Mallorie Juan, personally performed the services described in this documentation.   All medical record entries made by the scribe were at my direction and in my presence.   I have reviewed the chart and agree that the record is accurate and complete.   Mallorie Juan MD.  4:23 PM 06/25/2024     Clinical Impression:  Final diagnoses:  [R10.31, R10.32] Bilateral lower abdominal pain (Primary)  [K59.00] Constipation, unspecified constipation type  [N83.202] Left ovarian cyst          ED Disposition Condition    Discharge Stable                 Mallorie Juan MD  06/25/24 4949

## 2024-06-06 NOTE — ED PROVIDER NOTES
Care turned over from Dr Juan pending imaging, reassessment and final disposition.  Cruz Singer MD, Emergency Medicine Staff 6:21 PM 6/6/2024    Labs Reviewed    Admission on 06/06/2024, Discharged on 06/06/2024   Component Date Value Ref Range Status    POC Preg Test, Ur 06/06/2024 Negative  Negative Final     Acceptable 06/06/2024 Yes   Final    Glucose, UA 06/06/2024 Negative   Final    Bilirubin, UA 06/06/2024 Negative   Final    Ketones, UA 06/06/2024 Negative   Final    Spec Grav UA 06/06/2024 1.025   Final    Blood, UA 06/06/2024 3+ (A)   Final    PH, UA 06/06/2024 7.0   Final    Protein, UA 06/06/2024 2+ (A)   Final    Urobilinogen, UA 06/06/2024 0.2  E.U./dL Final    Nitrite, UA 06/06/2024 Negative   Final    Leukocytes, UA 06/06/2024 Negative   Final    Color, UA 06/06/2024 Red   Final    Clarity, UA 06/06/2024 Clear   Final    Albumin, POC 06/06/2024 3.8  3.3 - 5.5 g/dL Final    Alkaline Phosphatase, POC 06/06/2024 41 (L)  42 - 141 U/L Final    ALT (SGPT), POC 06/06/2024 16  10 - 47 U/L Final    AST (SGOT), POC 06/06/2024 24  11 - 38 U/L Final    POC BUN 06/06/2024 10  7 - 22 mg/dL Final    Calcium, POC 06/06/2024 9.3  8.0 - 10.3 mg/dL Final    POC Chloride 06/06/2024 108  98 - 108 mmol/L Final    POC Creatinine 06/06/2024 0.7  0.6 - 1.2 mg/dL Final    POC Glucose 06/06/2024 88  73 - 118 mg/dL Final    POC Potassium 06/06/2024 4.0  3.6 - 5.1 mmol/L Final    POC Sodium 06/06/2024 138  128 - 145 mmol/L Final    Bilirubin, POC 06/06/2024 0.6  0.2 - 1.6 mg/dL Final    POC TCO2 06/06/2024 28  18 - 33 mmol/L Final    Protein, POC 06/06/2024 7.2  6.4 - 8.1 g/dL Final        Imaging Reviewed    Imaging Results               CT Abdomen Pelvis With IV Contrast NO Oral Contrast (Final result)  Result time 06/06/24 18:20:22      Final result by Caleb Mckeon MD (06/06/24 18:20:22)                   Impression:      This report was flagged in Epic as abnormal.    1. Cystic structure within  the left adnexa, may reflect left ovarian cyst.  If there is pain in the region, ultrasound could be performed for further evaluation.  2. Several arterial enhancing lesions throughout the hepatic parenchyma, nonspecific however suggest hemangioma.  Nonemergent outpatient MRI is recommended for further characterization as warranted.  3. Large amount of stool in the colon suggests constipation.  This may account for slow flow through several distal small bowel loops however enteritis can present in this fashion.  Correlation is advised.  4. Cyst within the left perineum, suggesting Bartholin gland cyst.  This has decreased in size since the previous examination.  Continued follow-up as warranted.  5. Please see above for several additional findings.      Electronically signed by: Caleb Mckeon MD  Date:    06/06/2024  Time:    18:20               Narrative:    EXAMINATION:  CT ABDOMEN PELVIS WITH IV CONTRAST    CLINICAL HISTORY:  Abdominal pain, acute, nonlocalized;    TECHNIQUE:  Low dose axial images, sagittal and coronal reformations were obtained from the lung bases to the pubic symphysis following the IV administration of 75 mL of Omnipaque 350 .  Oral contrast was not given.    COMPARISON:  CT 07/06/2022, CT pelvis 04/25/2024    FINDINGS:  Images of the lower thorax are unremarkable.    Allowing for motion artifact, the liver is hypoattenuating, likely related to contrast phase however steatosis is a consideration, correlation with LFTs recommended.  There are numerous arterial enhancing lesions throughout the hepatic parenchyma, possibly reflecting flash filling hemangioma noting largest focus measures 1.3 cm.  These were not evident on the previous exam however may be more evident on current exam given bolus timing.  There are a few scattered low attenuating lesions within the hepatic parenchyma, too small for characterization.  The spleen, pancreas and adrenal glands are grossly unremarkable allowing for  motion artifact.  The gallbladder is decompressed, no definite calculi seen.  The portal vein, splenic vein, SMV, celiac axis and SMA all are patent.  The stomach is decompressed without wall thickening.  No significant abdominal lymphadenopathy.    The kidneys enhance symmetrically without hydronephrosis or nephrolithiasis.  The bilateral ureters are unable to be followed in their entirety to the urinary bladder, no definite calculi seen or secondary findings to suggest obstructive uropathy.  The urinary bladder is mildly distended without wall thickening.  The uterus and right adnexa are unremarkable.  There is a cystic structure within the left adnexa, may reflect ovarian cyst or bowel loop.  There is a small amount of fluid in the pelvis.    There is a large amount of stool in the rectum.  The large bowel is otherwise grossly unremarkable.  The terminal ileum is unremarkable.  The appendix is unremarkable.  The small bowel is remarkable for a few fluid-filled distal loops.  There are a few scattered shotty periaortic, pericaval, and mesenteric lymph nodes.    The osseous structures are intact.  No significant inguinal lymphadenopathy.    There is a vague cystic focus within the left perineum, decreased in size since CT 07/06/2023 and since examination 04/25/2024.                                      Medications given in ED    Medications   sodium chloride 0.9% bolus 1,000 mL 1,000 mL (0 mLs Intravenous Stopped 6/6/24 1752)   ondansetron injection 4 mg (4 mg Intravenous Given 6/6/24 1647)   iohexoL (OMNIPAQUE 350) injection 100 mL (75 mLs Intravenous Given 6/6/24 1804)   magnesium citrate solution 296 mL (296 mLs Oral Given 6/6/24 1848)   dicyclomine injection 20 mg (20 mg Intramuscular Given 6/6/24 1848)       Note was created using voice recognition software. Note may have occasional typographical errors that may not have been identified and edited despite good josh initial review prior to signing.    There is  no focal LLQ tenderness to suggest incidental left ovarian cyst to be the cause of patient's abdominal pain. Patient treated for constipation.  Test results, imaging results, outpatient management plan, outpatient PCP follow up and ED return precautions discussed with patient with understanding and agreement.        Cruz Singer MD  06/12/24 0030

## 2024-06-06 NOTE — TELEPHONE ENCOUNTER
Pt calls and reports severe stomach pains since yesterday. Pt reports diffuse abd pain with nausea and vomiting and diarrhea. Pt reports that pain is 10/10.    Dispo is to go to ED now. Care advice given. Pt v/u.   Reason for Disposition   SEVERE abdominal pain (e.g., excruciating)    Additional Information   Negative: Passed out (i.e., fainted, collapsed and was not responding)   Negative: Shock suspected (e.g., cold/pale/clammy skin, too weak to stand, low BP, rapid pulse)   Negative: Sounds like a life-threatening emergency to the triager    Protocols used: Abdominal Pain - Female-A-[place]

## 2024-06-06 NOTE — Clinical Note
"Alessandro Pérez" Sajan was seen and treated in our emergency department on 6/6/2024.  She may return to work on 06/10/2024.       If you have any questions or concerns, please don't hesitate to call.       RN    "

## 2024-06-06 NOTE — ED NOTES
Reports  started  menstrual  cycle  2  days  ago  C/o  generalized  lower  abdominal  discomfort   ont  today

## 2024-12-19 ENCOUNTER — OFFICE VISIT (OUTPATIENT)
Dept: INTERNAL MEDICINE | Facility: CLINIC | Age: 19
End: 2024-12-19
Payer: MEDICAID

## 2024-12-19 ENCOUNTER — IMMUNIZATION (OUTPATIENT)
Dept: INTERNAL MEDICINE | Facility: CLINIC | Age: 19
End: 2024-12-19
Payer: COMMERCIAL

## 2024-12-19 ENCOUNTER — LAB VISIT (OUTPATIENT)
Dept: LAB | Facility: HOSPITAL | Age: 19
End: 2024-12-19
Payer: COMMERCIAL

## 2024-12-19 VITALS
HEART RATE: 90 BPM | HEIGHT: 67 IN | SYSTOLIC BLOOD PRESSURE: 110 MMHG | BODY MASS INDEX: 16.74 KG/M2 | OXYGEN SATURATION: 98 % | WEIGHT: 106.69 LBS | DIASTOLIC BLOOD PRESSURE: 76 MMHG

## 2024-12-19 DIAGNOSIS — Z23 NEED FOR VACCINATION: Primary | ICD-10-CM

## 2024-12-19 DIAGNOSIS — Z00.00 ANNUAL PHYSICAL EXAM: ICD-10-CM

## 2024-12-19 DIAGNOSIS — Z00.00 ANNUAL PHYSICAL EXAM: Primary | ICD-10-CM

## 2024-12-19 LAB
ALBUMIN SERPL BCP-MCNC: 4.3 G/DL (ref 3.5–5.2)
ALP SERPL-CCNC: 45 U/L (ref 40–150)
ALT SERPL W/O P-5'-P-CCNC: 7 U/L (ref 10–44)
ANION GAP SERPL CALC-SCNC: 6 MMOL/L (ref 8–16)
AST SERPL-CCNC: 17 U/L (ref 10–40)
BASOPHILS # BLD AUTO: 0.02 K/UL (ref 0–0.2)
BASOPHILS NFR BLD: 0.7 % (ref 0–1.9)
BILIRUB SERPL-MCNC: 0.3 MG/DL (ref 0.1–1)
BUN SERPL-MCNC: 10 MG/DL (ref 6–20)
CALCIUM SERPL-MCNC: 9.8 MG/DL (ref 8.7–10.5)
CHLORIDE SERPL-SCNC: 108 MMOL/L (ref 95–110)
CHOLEST SERPL-MCNC: 184 MG/DL (ref 120–199)
CHOLEST/HDLC SERPL: 2.5 {RATIO} (ref 2–5)
CO2 SERPL-SCNC: 23 MMOL/L (ref 23–29)
CREAT SERPL-MCNC: 0.7 MG/DL (ref 0.5–1.4)
DIFFERENTIAL METHOD BLD: ABNORMAL
EOSINOPHIL # BLD AUTO: 0 K/UL (ref 0–0.5)
EOSINOPHIL NFR BLD: 1.3 % (ref 0–8)
ERYTHROCYTE [DISTWIDTH] IN BLOOD BY AUTOMATED COUNT: 17.8 % (ref 11.5–14.5)
EST. GFR  (NO RACE VARIABLE): >60 ML/MIN/1.73 M^2
ESTIMATED AVG GLUCOSE: 103 MG/DL (ref 68–131)
GLUCOSE SERPL-MCNC: 89 MG/DL (ref 70–110)
HBA1C MFR BLD: 5.2 % (ref 4–5.6)
HCT VFR BLD AUTO: 32.5 % (ref 37–48.5)
HDLC SERPL-MCNC: 73 MG/DL (ref 40–75)
HDLC SERPL: 39.7 % (ref 20–50)
HGB BLD-MCNC: 9.4 G/DL (ref 12–16)
IMM GRANULOCYTES # BLD AUTO: 0 K/UL (ref 0–0.04)
IMM GRANULOCYTES NFR BLD AUTO: 0 % (ref 0–0.5)
LDLC SERPL CALC-MCNC: 101.8 MG/DL (ref 63–159)
LYMPHOCYTES # BLD AUTO: 1.5 K/UL (ref 1–4.8)
LYMPHOCYTES NFR BLD: 47.9 % (ref 18–48)
MCH RBC QN AUTO: 21.5 PG (ref 27–31)
MCHC RBC AUTO-ENTMCNC: 28.9 G/DL (ref 32–36)
MCV RBC AUTO: 74 FL (ref 82–98)
MONOCYTES # BLD AUTO: 0.2 K/UL (ref 0.3–1)
MONOCYTES NFR BLD: 7.9 % (ref 4–15)
NEUTROPHILS # BLD AUTO: 1.3 K/UL (ref 1.8–7.7)
NEUTROPHILS NFR BLD: 42.2 % (ref 38–73)
NONHDLC SERPL-MCNC: 111 MG/DL
NRBC BLD-RTO: 0 /100 WBC
PLATELET # BLD AUTO: 358 K/UL (ref 150–450)
PMV BLD AUTO: 11.4 FL (ref 9.2–12.9)
POTASSIUM SERPL-SCNC: 3.5 MMOL/L (ref 3.5–5.1)
PROT SERPL-MCNC: 7.8 G/DL (ref 6–8.4)
RBC # BLD AUTO: 4.38 M/UL (ref 4–5.4)
SODIUM SERPL-SCNC: 137 MMOL/L (ref 136–145)
TRIGL SERPL-MCNC: 46 MG/DL (ref 30–150)
TSH SERPL DL<=0.005 MIU/L-ACNC: 1.82 UIU/ML (ref 0.4–4)
WBC # BLD AUTO: 3.03 K/UL (ref 3.9–12.7)

## 2024-12-19 PROCEDURE — 36415 COLL VENOUS BLD VENIPUNCTURE: CPT | Performed by: PHYSICIAN ASSISTANT

## 2024-12-19 PROCEDURE — 80061 LIPID PANEL: CPT | Performed by: PHYSICIAN ASSISTANT

## 2024-12-19 PROCEDURE — 90656 IIV3 VACC NO PRSV 0.5 ML IM: CPT | Mod: S$GLB,,, | Performed by: INTERNAL MEDICINE

## 2024-12-19 PROCEDURE — 84443 ASSAY THYROID STIM HORMONE: CPT | Performed by: PHYSICIAN ASSISTANT

## 2024-12-19 PROCEDURE — 85025 COMPLETE CBC W/AUTO DIFF WBC: CPT | Performed by: PHYSICIAN ASSISTANT

## 2024-12-19 PROCEDURE — 83036 HEMOGLOBIN GLYCOSYLATED A1C: CPT | Performed by: PHYSICIAN ASSISTANT

## 2024-12-19 PROCEDURE — 99999 PR PBB SHADOW E&M-EST. PATIENT-LVL III: CPT | Mod: PBBFAC,,, | Performed by: PHYSICIAN ASSISTANT

## 2024-12-19 PROCEDURE — 99213 OFFICE O/P EST LOW 20 MIN: CPT | Mod: PBBFAC | Performed by: PHYSICIAN ASSISTANT

## 2024-12-19 PROCEDURE — G0008 ADMIN INFLUENZA VIRUS VAC: HCPCS | Mod: S$GLB,,, | Performed by: INTERNAL MEDICINE

## 2024-12-19 PROCEDURE — 80053 COMPREHEN METABOLIC PANEL: CPT | Performed by: PHYSICIAN ASSISTANT

## 2024-12-19 NOTE — PROGRESS NOTES
Internal Medicine Annual Exam       CHIEF COMPLAINT     The patient, Alessandro Moeller, who is a 19 y.o. female presents for an annual exam.    HPI     PCP is No, Primary Doctor, patient is new to me.     Patient presents for annual exam.  She has no significant past medical history.  Health maintenance:  Patient is eligible for flu vaccine, COVID vaccine, Tdap  She is eligible for chlamydia screening will establish care with gyn  She is due for a Pap smear  She is due for annual labs      Past Medical History:  Past Medical History:   Diagnosis Date    Proteinuria, postural 2012    benign       Past Surgical History:   Procedure Laterality Date    COLONOSCOPY N/A 8/6/2021    Procedure: COLONOSCOPY;  Surgeon: Constantino Shea MD;  [facility]  Service: Endoscopy;  Laterality: N/A;    ESOPHAGOGASTRODUODENOSCOPY N/A 8/6/2021    Procedure: (EGD);  Surgeon: Constantino Shea MD;  Location: [facility] ([facility]);  Service: Endoscopy;  Laterality: N/A;  covid test 8/3 [facility]        Family History   Problem Relation Name Age of Onset    Sleep apnea Mother      Hypertension Mother      Mitral valve prolapse Mother      Alcohol abuse Mother      Drug abuse Mother      Alcohol abuse Father      Drug abuse Father      Mental illness Father      Congenital heart disease Sister      Learning disabilities Brother      Diabetes Maternal Grandmother      Ovarian cancer Maternal Grandmother      Hypertension Maternal Grandmother      Hyperlipidemia Maternal Grandmother      Obesity Paternal Aunt      Diabetes Paternal Aunt      Obesity Paternal Uncle      Hypertension Paternal Uncle      Diabetes Paternal Uncle      Hyperlipidemia Paternal Uncle      Diabetes Paternal Grandmother      Hyperlipidemia Paternal Grandmother      Hypertension Paternal Grandmother      Cancer Paternal Grandmother  62        ovarian    Melanoma Neg Hx      Psoriasis Neg Hx      Lupus Neg Hx          Social History     Socioeconomic  History    Marital status: Single   Tobacco Use    Smoking status: Every Day     Types: Vaping with nicotine, Vaping w/o nicotine    Smokeless tobacco: Current   Substance and Sexual Activity    Alcohol use: No    Drug use: No    Sexual activity: Yes     Partners: Female   Social History Narrative    Lives with uncle Ronak & aunt who is legal guardian -both parents with drug and alcohol addictions.    Dog.    No smokers    8th grade        Social History     Tobacco Use   Smoking Status Every Day    Types: Vaping with nicotine, Vaping w/o nicotine   Smokeless Tobacco Current        Allergies as of 12/19/2024    (No Known Allergies)          Home Medications:  Prior to Admission medications    Medication Sig Start Date End Date Taking? Authorizing Provider   polyethylene glycol (GLYCOLAX) 17 gram/dose powder Take 17 g by mouth once daily. Take daily to prevent constipation 6/6/24  Yes Cruz Singer MD       Review of Systems:  Review of Systems   Constitutional:  Negative for chills and fever.   HENT:  Negative for sore throat and trouble swallowing.    Eyes:  Negative for visual disturbance.   Respiratory:  Negative for cough and shortness of breath.    Cardiovascular:  Negative for chest pain.   Gastrointestinal:  Negative for abdominal pain, constipation, diarrhea, nausea and vomiting.   Genitourinary:  Negative for dysuria and flank pain.   Musculoskeletal:  Negative for back pain, neck pain and neck stiffness.   Skin:  Negative for rash.   Neurological:  Negative for dizziness, syncope, weakness and headaches.   Psychiatric/Behavioral:  Negative for confusion.        Health Maintainence:   Immunizations:  Health Maintenance         Date Due Completion Date    Pneumococcal Vaccines (Age 0-64) (1 of 2 - PCV) 10/20/2009 1/31/2008    Chlamydia Screening 07/06/2023 7/6/2022    Influenza Vaccine (1) 09/01/2024 12/30/2021    COVID-19 Vaccine (3 - 2024-25 season) 09/01/2024 5/11/2021    TETANUS VACCINE 10/28/2024  "10/28/2014    DTaP/Tdap/Td Vaccines (7 - Td or Tdap) 10/28/2024 10/28/2014    Pap Smear 10/20/2024 ---    RSV Vaccine (Age 60+ and Pregnant patients) (1 - 1-dose 75+ series) 10/20/2078 ---             PHYSICAL EXAM     /76 (BP Location: Left arm, Patient Position: Sitting)   Pulse 90   Ht 5' 7" (1.702 m)   Wt 48.4 kg (106 lb 11.2 oz)   LMP 11/19/2024 (Approximate)   SpO2 98%   BMI 16.71 kg/m²  Body mass index is 16.71 kg/m².    Physical Exam  Vitals and nursing note reviewed.   Constitutional:       Appearance: Normal appearance.      Comments: Healthy-appearing female in no acute distress or apparent pain.  She makes good eye contact, speaks in clear full sentences and ambulates with ease.   HENT:      Head: Normocephalic and atraumatic.      Nose: Nose normal.      Mouth/Throat:      Pharynx: Oropharynx is clear.   Eyes:      Conjunctiva/sclera: Conjunctivae normal.   Cardiovascular:      Rate and Rhythm: Normal rate and regular rhythm.      Pulses: Normal pulses.   Pulmonary:      Effort: No respiratory distress.   Abdominal:      Tenderness: There is no abdominal tenderness.   Musculoskeletal:         General: Normal range of motion.      Cervical back: No rigidity.   Skin:     General: Skin is warm and dry.      Capillary Refill: Capillary refill takes less than 2 seconds.      Findings: No rash.   Neurological:      General: No focal deficit present.      Mental Status: She is alert.      Gait: Gait normal.   Psychiatric:         Mood and Affect: Mood normal.         LABS     Lab Results   Component Value Date    HGBA1C 5.4 12/30/2021     CMP  Sodium   Date Value Ref Range Status   07/06/2022 138 136 - 145 mmol/L Final     Potassium   Date Value Ref Range Status   07/06/2022 4.3 3.5 - 5.1 mmol/L Final     Chloride   Date Value Ref Range Status   07/06/2022 106 95 - 110 mmol/L Final     CO2   Date Value Ref Range Status   07/06/2022 21 (L) 23 - 29 mmol/L Final     Glucose   Date Value Ref Range " Status   07/06/2022 82 70 - 110 mg/dL Final     BUN   Date Value Ref Range Status   07/06/2022 11 6 - 20 mg/dL Final     Creatinine   Date Value Ref Range Status   07/06/2022 0.7 0.5 - 1.4 mg/dL Final     Calcium   Date Value Ref Range Status   07/06/2022 10.4 8.7 - 10.5 mg/dL Final     Total Protein   Date Value Ref Range Status   07/06/2022 7.7 6.0 - 8.4 g/dL Final     Albumin   Date Value Ref Range Status   07/06/2022 4.1 3.2 - 4.7 g/dL Final     Total Bilirubin   Date Value Ref Range Status   07/06/2022 0.2 0.1 - 1.0 mg/dL Final     Comment:     For infants and newborns, interpretation of results should be based  on gestational age, weight and in agreement with clinical  observations.    Premature Infant recommended reference ranges:  Up to 24 hours.............<8.0 mg/dL  Up to 48 hours............<12.0 mg/dL  3-5 days..................<15.0 mg/dL  6-29 days.................<15.0 mg/dL       Alkaline Phosphatase   Date Value Ref Range Status   07/06/2022 64 48 - 95 U/L Final     AST   Date Value Ref Range Status   07/06/2022 19 10 - 40 U/L Final     ALT   Date Value Ref Range Status   07/06/2022 14 10 - 44 U/L Final     Anion Gap   Date Value Ref Range Status   07/06/2022 11 8 - 16 mmol/L Final     eGFR if    Date Value Ref Range Status   07/06/2022 >60 >60 mL/min/1.73 m^2 Final     eGFR if non    Date Value Ref Range Status   07/06/2022 >60 >60 mL/min/1.73 m^2 Final     Comment:     Calculation used to obtain the estimated glomerular filtration  rate (eGFR) is the CKD-EPI equation.        Lab Results   Component Value Date    WBC 0-5 06/26/2023    HGB 8.2 (L) 07/06/2022    HCT 29.5 (L) 07/06/2022    MCV 72 (L) 07/06/2022     07/06/2022     Lab Results   Component Value Date    CHOL 198 12/30/2021    CHOL 206 (H) 06/26/2021     Lab Results   Component Value Date    HDL 72 12/30/2021    HDL 70 06/26/2021     Lab Results   Component Value Date    LDLCALC 117.6 12/30/2021     LDLCALC 125.6 06/26/2021     Lab Results   Component Value Date    TRIG 42 12/30/2021    TRIG 52 06/26/2021     Lab Results   Component Value Date    CHOLHDL 36.4 12/30/2021    CHOLHDL 34.0 06/26/2021     Lab Results   Component Value Date    TSH 1.187 12/30/2021       ASSESSMENT/PLAN     Alessandro Moeller is a 19 y.o. female    Alessandro was seen today for annual exam.  She is doing well will get Tdap and flu vaccines updated today.  Will do annual labs.  Will establish care with primary care provider.  Will establish care with gyn.    Diagnoses and all orders for this visit:    Annual physical exam  -     CBC Auto Differential; Future  -     Comprehensive Metabolic Panel; Future  -     Hemoglobin A1C; Future  -     Lipid Panel; Future  -     TSH; Future  -     Tdap (BOOSTRIX) vaccine injection 0.5 mL    Rosalina Moeller PA-C  Department of Internal Medicine - [facility]   8:37 AM

## 2025-01-17 ENCOUNTER — OFFICE VISIT (OUTPATIENT)
Dept: OBSTETRICS AND GYNECOLOGY | Facility: CLINIC | Age: 20
End: 2025-01-17
Payer: COMMERCIAL

## 2025-01-17 VITALS — BODY MASS INDEX: 16.8 KG/M2 | DIASTOLIC BLOOD PRESSURE: 80 MMHG | WEIGHT: 107.25 LBS | SYSTOLIC BLOOD PRESSURE: 110 MMHG

## 2025-01-17 DIAGNOSIS — Z00.00 ANNUAL PHYSICAL EXAM: ICD-10-CM

## 2025-01-17 DIAGNOSIS — Z11.3 SCREEN FOR STD (SEXUALLY TRANSMITTED DISEASE): ICD-10-CM

## 2025-01-17 DIAGNOSIS — Z01.419 WELL WOMAN EXAM WITH ROUTINE GYNECOLOGICAL EXAM: Primary | ICD-10-CM

## 2025-01-17 PROCEDURE — 3074F SYST BP LT 130 MM HG: CPT | Mod: CPTII,S$GLB,, | Performed by: STUDENT IN AN ORGANIZED HEALTH CARE EDUCATION/TRAINING PROGRAM

## 2025-01-17 PROCEDURE — 1160F RVW MEDS BY RX/DR IN RCRD: CPT | Mod: CPTII,S$GLB,, | Performed by: STUDENT IN AN ORGANIZED HEALTH CARE EDUCATION/TRAINING PROGRAM

## 2025-01-17 PROCEDURE — 1159F MED LIST DOCD IN RCRD: CPT | Mod: CPTII,S$GLB,, | Performed by: STUDENT IN AN ORGANIZED HEALTH CARE EDUCATION/TRAINING PROGRAM

## 2025-01-17 PROCEDURE — 3008F BODY MASS INDEX DOCD: CPT | Mod: CPTII,S$GLB,, | Performed by: STUDENT IN AN ORGANIZED HEALTH CARE EDUCATION/TRAINING PROGRAM

## 2025-01-17 PROCEDURE — 99395 PREV VISIT EST AGE 18-39: CPT | Mod: S$GLB,,, | Performed by: STUDENT IN AN ORGANIZED HEALTH CARE EDUCATION/TRAINING PROGRAM

## 2025-01-17 PROCEDURE — 88175 CYTOPATH C/V AUTO FLUID REDO: CPT | Performed by: PATHOLOGY

## 2025-01-17 PROCEDURE — 3079F DIAST BP 80-89 MM HG: CPT | Mod: CPTII,S$GLB,, | Performed by: STUDENT IN AN ORGANIZED HEALTH CARE EDUCATION/TRAINING PROGRAM

## 2025-01-17 PROCEDURE — 87491 CHLMYD TRACH DNA AMP PROBE: CPT | Performed by: STUDENT IN AN ORGANIZED HEALTH CARE EDUCATION/TRAINING PROGRAM

## 2025-01-17 PROCEDURE — 88141 CYTOPATH C/V INTERPRET: CPT | Mod: ,,, | Performed by: PATHOLOGY

## 2025-01-17 PROCEDURE — 99999 PR PBB SHADOW E&M-EST. PATIENT-LVL III: CPT | Mod: PBBFAC,,, | Performed by: STUDENT IN AN ORGANIZED HEALTH CARE EDUCATION/TRAINING PROGRAM

## 2025-01-17 NOTE — PROGRESS NOTES
Subjective     Patient ID: Alessandro Moeller is a 19 y.o. female.    Chief Complaint:  Well Woman      History of Present Illness  18 yo G0 presents for WWE    No issues to report today. Had a bartholin cyst that was drained last year  Periods are regular and predictable, feels like they are manageable without contraception  SA with women. Due for pap today, has had HPV vaccines. Desires STI screening       Annual Exam-Premenopausal  Patient presents for annual exam. The patient has no complaints today. The patient is sexually active. GYN screening history: no prior history of gyn screening tests. The patient wears seatbelts: yes. The patient participates in regular exercise: yes. Has the patient ever been transfused or tattooed?:  yes: tattoo . The patient reports that there is not domestic violence in her life.    GYN & OB History  Patient's last menstrual period was 12/15/2024 (approximate).   Date of Last Pap: No result found    OB History    Para Term  AB Living   0 0 0 0 0 0   SAB IAB Ectopic Multiple Live Births   0 0 0 0 0       Review of Systems  Review of Systems   All other systems reviewed and are negative.         Objective   Physical Exam:   Constitutional: She appears well-developed and well-nourished.    HENT:   Head: Normocephalic and atraumatic.    Eyes: EOM are normal.      Pulmonary/Chest: Effort normal.        Abdominal: Soft.     Genitourinary:    Uterus, right adnexa and left adnexa normal.      Pelvic exam was performed with patient in the lithotomy position.   The external female genitalia was normal.   Genitalia hair distrobution normal .   There is no lesion on the right labia. There is no lesion on the left labia. Cervix is normal. There is vaginal discharge in the vagina.    pap smear completed   Genitourinary Comments: Female chaperone present for duration of exam             Musculoskeletal: Normal range of motion.       Neurological: She is alert.    Skin: Skin is warm and  dry.    Psychiatric: She has a normal mood and affect.            Assessment and Plan     1. Well woman exam with routine gynecological exam    2. Screen for STD (sexually transmitted disease)        Plan:  1. Well woman exam with routine gynecological exam (Primary)  - Pap done today.  - Screening tests as ordered.  - Diet and exercise encouraged.  Reviewed ASCCP Pap guidelines and screening recommendations.    Counseling: HPV vaccine  Health Screens: Health Maintenance reviewed  Breast awareness encouraged  - Ambulatory referral/consult to Obstetrics / Gynecology  - Liquid-Based Pap Smear, Screening    2. Screen for STD (sexually transmitted disease)  - C. trachomatis/N. gonorrhoeae by AMP DNA [facility]; Urine  - Treponema Pallidium Antibodies IgG, IgM; Future  - HIV 1/2 Ag/Ab (4th Gen); Future      Follow up in 1 year or sooner as needed     Julian Licea III, MD  [facility] - OB GYN   [address]  [place] [id number] [phone]

## 2025-01-22 LAB
C TRACH DNA SPEC QL NAA+PROBE: NOT DETECTED
N GONORRHOEA DNA SPEC QL NAA+PROBE: NOT DETECTED

## 2025-01-28 LAB
FINAL PATHOLOGIC DIAGNOSIS: NORMAL
Lab: NORMAL

## 2025-04-03 ENCOUNTER — HOSPITAL ENCOUNTER (EMERGENCY)
Facility: HOSPITAL | Age: 20
Discharge: HOME | End: 2025-04-03
Attending: EMERGENCY MEDICINE
Payer: COMMERCIAL

## 2025-04-03 VITALS
OXYGEN SATURATION: 100 % | HEART RATE: 94 BPM | BODY MASS INDEX: 18.21 KG/M2 | DIASTOLIC BLOOD PRESSURE: 74 MMHG | HEIGHT: 67 IN | WEIGHT: 116 LBS | SYSTOLIC BLOOD PRESSURE: 125 MMHG | TEMPERATURE: 99 F | RESPIRATION RATE: 20 BRPM

## 2025-04-03 DIAGNOSIS — N89.8 VAGINAL DISCHARGE: ICD-10-CM

## 2025-04-03 DIAGNOSIS — N75.1 BARTHOLIN'S GLAND ABSCESS: Primary | ICD-10-CM

## 2025-04-03 DIAGNOSIS — N39.0 ACUTE UTI: ICD-10-CM

## 2025-04-03 LAB
B-HCG UR QL: NEGATIVE
BILIRUBIN, POC UA: NEGATIVE
BLOOD, POC UA: ABNORMAL
CLARITY, UA: CLEAR
COLOR, UA: YELLOW
CTP QC/QA: YES
CTP QC/QA: YES
GLUCOSE, POC UA: NEGATIVE
INFLUENZA A ANTIGEN, POC: NEGATIVE
INFLUENZA B ANTIGEN, POC: NEGATIVE
KETONES, POC UA: NEGATIVE
LEUKOCYTE EST, POC UA: ABNORMAL
NITRITE, POC UA: NEGATIVE
PH UR STRIP: 6 [PH] (ref 5–8)
POC RAPID STREP A: NEGATIVE
PROTEIN, POC UA: NEGATIVE
SARS-COV-2 RDRP RESP QL NAA+PROBE: NEGATIVE
SPECIFIC GRAVITY, POC UA: 1.02 (ref 1–1.03)
UROBILINOGEN, POC UA: 0.2 E.U./DL

## 2025-04-03 PROCEDURE — 63600175 PHARM REV CODE 636 W HCPCS: Mod: ER | Performed by: EMERGENCY MEDICINE

## 2025-04-03 PROCEDURE — 56420 I&D BARTHOLINS GLAND ABSCESS: CPT | Mod: ER

## 2025-04-03 PROCEDURE — 25000003 PHARM REV CODE 250: Mod: ER | Performed by: EMERGENCY MEDICINE

## 2025-04-03 PROCEDURE — 87070 CULTURE OTHR SPECIMN AEROBIC: CPT | Performed by: EMERGENCY MEDICINE

## 2025-04-03 PROCEDURE — 81025 URINE PREGNANCY TEST: CPT | Mod: ER | Performed by: NURSE PRACTITIONER

## 2025-04-03 PROCEDURE — 87880 STREP A ASSAY W/OPTIC: CPT | Mod: ER

## 2025-04-03 PROCEDURE — 81515 NFCT DS BV&VAGINITIS DNA ALG: CPT | Performed by: NURSE PRACTITIONER

## 2025-04-03 PROCEDURE — 87804 INFLUENZA ASSAY W/OPTIC: CPT | Mod: ER

## 2025-04-03 PROCEDURE — 96372 THER/PROPH/DIAG INJ SC/IM: CPT | Performed by: EMERGENCY MEDICINE

## 2025-04-03 PROCEDURE — 87635 SARS-COV-2 COVID-19 AMP PRB: CPT | Mod: ER | Performed by: EMERGENCY MEDICINE

## 2025-04-03 PROCEDURE — 87088 URINE BACTERIA CULTURE: CPT | Performed by: EMERGENCY MEDICINE

## 2025-04-03 PROCEDURE — 81003 URINALYSIS AUTO W/O SCOPE: CPT | Mod: ER

## 2025-04-03 PROCEDURE — 99284 EMERGENCY DEPT VISIT MOD MDM: CPT | Mod: 25,ER

## 2025-04-03 PROCEDURE — 87591 N.GONORRHOEAE DNA AMP PROB: CPT | Performed by: NURSE PRACTITIONER

## 2025-04-03 RX ORDER — OXYCODONE AND ACETAMINOPHEN 5; 325 MG/1; MG/1
1 TABLET ORAL
Refills: 0 | Status: COMPLETED | OUTPATIENT
Start: 2025-04-03 | End: 2025-04-03

## 2025-04-03 RX ORDER — LIDOCAINE HYDROCHLORIDE AND EPINEPHRINE 10; 10 UG/ML; MG/ML
1 INJECTION, SOLUTION INFILTRATION; PERINEURAL
Status: COMPLETED | OUTPATIENT
Start: 2025-04-03 | End: 2025-04-03

## 2025-04-03 RX ORDER — OXYCODONE AND ACETAMINOPHEN 5; 325 MG/1; MG/1
1 TABLET ORAL EVERY 4 HOURS PRN
Qty: 12 TABLET | Refills: 0 | Status: ON HOLD | OUTPATIENT
Start: 2025-04-03

## 2025-04-03 RX ORDER — BUPIVACAINE HYDROCHLORIDE 5 MG/ML
5 INJECTION, SOLUTION EPIDURAL; INTRACAUDAL; PERINEURAL
Status: COMPLETED | OUTPATIENT
Start: 2025-04-03 | End: 2025-04-03

## 2025-04-03 RX ORDER — ACETAMINOPHEN 500 MG
500 TABLET ORAL EVERY 6 HOURS PRN
Qty: 30 TABLET | Refills: 0 | Status: ON HOLD | OUTPATIENT
Start: 2025-04-03

## 2025-04-03 RX ORDER — DOXYCYCLINE 100 MG/1
100 CAPSULE ORAL 2 TIMES DAILY
Qty: 28 CAPSULE | Refills: 0 | Status: ON HOLD | OUTPATIENT
Start: 2025-04-03 | End: 2025-04-17

## 2025-04-03 RX ORDER — IBUPROFEN 600 MG/1
600 TABLET ORAL EVERY 6 HOURS PRN
Qty: 20 TABLET | Refills: 0 | Status: ON HOLD | OUTPATIENT
Start: 2025-04-03

## 2025-04-03 RX ADMIN — LIDOCAINE HYDROCHLORIDE,EPINEPHRINE BITARTRATE 1 ML: 10; .01 INJECTION, SOLUTION INFILTRATION; PERINEURAL at 02:04

## 2025-04-03 RX ADMIN — CEFTRIAXONE 1 G: 1 INJECTION, POWDER, FOR SOLUTION INTRAMUSCULAR; INTRAVENOUS at 02:04

## 2025-04-03 RX ADMIN — BUPIVACAINE HYDROCHLORIDE 25 MG: 5 INJECTION, SOLUTION EPIDURAL; INTRACAUDAL; PERINEURAL at 02:04

## 2025-04-03 RX ADMIN — OXYCODONE HYDROCHLORIDE AND ACETAMINOPHEN 1 TABLET: 5; 325 TABLET ORAL at 02:04

## 2025-04-03 NOTE — DISCHARGE INSTRUCTIONS
Please take your antibiotics until prescription is finished.  Follow up with OBGYN in 1-2 days.  Return to the ED if symptoms worsen or do not improve.

## 2025-04-03 NOTE — ED PROVIDER NOTES
Encounter Date: 4/3/2025    SCRIBE #1 NOTE: I, Devonte Tate Do, am scribing for, and in the presence of,  Miryam Ruggiero DO. I have scribed the following portions of the note - Other sections scribed: HPI, ROS, PE, MDM.       History     Chief Complaint   Patient presents with    Groin Swelling     Pt reports vaginal swelling with drainage onset yesterday, also reports body aches and chills     19 y.o. female with no pertinent PMHx, who presents to the ED for chief complaint of URI concerns onset yesterday. Patient reports cough, congestion, rhinorrhea, and fever. She also reports foul smelling vaginal discharge and a painful area of swelling on the left-side of her vagina that has been draining. She reports concerns concerns for Gonorrhea and Chlamydia. No other exacerbating or alleviating factors. Patient denies any SOB, leg swelling, or other associated symptoms.  Patient states she has been with the same partner for 3 years.    The history is provided by the patient. No  was used.     Review of patient's allergies indicates:  No Known Allergies  Past Medical History:   Diagnosis Date    Proteinuria, postural 2012    benign     Past Surgical History:   Procedure Laterality Date    COLONOSCOPY N/A 8/6/2021    Procedure: COLONOSCOPY;  Surgeon: Constantino Shea MD;  Location: [facility] ([address] [place] [address]);  Service: Endoscopy;  Laterality: N/A;    ESOPHAGOGASTRODUODENOSCOPY N/A 8/6/2021    Procedure: (EGD);  Surgeon: Constantino Shea MD;  Location: [facility] ([address] [place] [address]);  Service: Endoscopy;  Laterality: N/A;  covid test 8/3 [facility]     Family History   Problem Relation Name Age of Onset    Sleep apnea Mother      Hypertension Mother      Mitral valve prolapse Mother      Alcohol abuse Mother      Drug abuse Mother      Alcohol abuse Father      Drug abuse Father      Mental illness Father      Congenital heart disease Sister      Learning disabilities Brother      Diabetes Maternal Grandmother      Ovarian  cancer Maternal Grandmother      Hypertension Maternal Grandmother      Hyperlipidemia Maternal Grandmother      Obesity Paternal Aunt      Diabetes Paternal Aunt      Obesity Paternal Uncle      Hypertension Paternal Uncle      Diabetes Paternal Uncle      Hyperlipidemia Paternal Uncle      Diabetes Paternal Grandmother      Hyperlipidemia Paternal Grandmother      Hypertension Paternal Grandmother      Cancer Paternal Grandmother  62        ovarian    Melanoma Neg Hx      Psoriasis Neg Hx      Lupus Neg Hx       Social History[1]  Review of Systems   Constitutional:  Positive for fever.   HENT:  Positive for congestion and rhinorrhea. Negative for sore throat.    Eyes:  Negative for redness.   Respiratory:  Positive for cough. Negative for shortness of breath.    Cardiovascular:  Negative for chest pain and leg swelling.   Gastrointestinal:  Negative for abdominal pain, diarrhea, nausea and vomiting.   Genitourinary:  Positive for dysuria and vaginal discharge.   Musculoskeletal:  Negative for back pain.   Skin:  Negative for rash.   Neurological:  Negative for syncope and headaches.   All other systems reviewed and are negative.      Physical Exam     Initial Vitals [04/03/25 1227]   BP Pulse Resp Temp SpO2   125/74 94 20 98.5 °F (36.9 °C) 100 %      MAP       --         Physical Exam    Nursing note and vitals reviewed.  Constitutional: Vital signs are normal. She appears well-developed and well-nourished.   HENT:   Head: Normocephalic and atraumatic.   Right Ear: External ear normal.   Left Ear: External ear normal.   Nose: Mucosal edema and rhinorrhea present. Mouth/Throat: Oropharynx is clear and moist.   Eyes: Conjunctivae are normal.   Neck: Neck supple.   Normal range of motion.  Cardiovascular:  Normal rate, regular rhythm and normal heart sounds.     Exam reveals no gallop and no friction rub.       No murmur heard.  Pulmonary/Chest: Breath sounds normal. No respiratory distress. She has no wheezes. She  has no rhonchi. She has no rales.   Abdominal: Abdomen is soft. Bowel sounds are normal. She exhibits no distension. There is no abdominal tenderness. There is no rebound and no guarding.   Genitourinary: Rectum:      No external hemorrhoid.      Pelvic exam was performed with patient supine.   There is no rash, tenderness, lesion or injury on the right labia. There is tenderness and lesion on the left labia. There is no rash or injury on the left labia. Cervix exhibits discharge and friability. Cervix exhibits no motion tenderness. Right adnexum displays no mass, no tenderness and no fullness. Left adnexum displays mass, tenderness and fullness.    Vaginal discharge present.      Genitourinary Comments:  exam chaperoned by Doris Connelly RN. There is diffuse yellow vaginal discharge. There is swelling to the left labia majora approximately the size of a chicken egg.      Musculoskeletal:      Cervical back: Normal range of motion and neck supple.     Neurological: She is alert and oriented to person, place, and time. She has normal strength.   Skin: Skin is warm and dry. Capillary refill takes less than 2 seconds. Abscess noted. No rash noted. There is erythema.   Psychiatric: She has a normal mood and affect.         ED Course   I & D - Incision and Drainage    Date/Time: 4/3/2025 2:41 PM  Location procedure was performed: [facility] EMERGENCY DEPARTMENT    Performed by: Miryam Ruggiero DO  Authorized by: Miryam Ruggiero DO  Consent Done: Yes  Consent: Verbal consent obtained  Risks and benefits: risks, benefits and alternatives were discussed  Consent given by: patient  Patient understanding: patient states understanding of the procedure being performed  Patient consent: the patient's understanding of the procedure matches consent given  Procedure consent: procedure consent matches procedure scheduled  Relevant documents: relevant documents present and verified  Test results: test results not available  Site  marked: the operative site was not marked  Imaging studies: imaging studies not available  Patient identity confirmed: , MRN, name and verbally with patient  Type: abscess (Bartholin's gland abscess)  Body area: anogenital  Location details: Bartholin's gland  Anesthesia: local infiltration    Anesthesia:  Local Anesthetic: bupivacaine 0.5% without epinephrine and lidocaine 1% with epinephrine  Anesthetic total: 10 mL    Patient sedated: no  Scalpel size: 11  Incision type: single straight  Drainage: pus (Yellow)  Drainage amount: copious  Wound treatment: incision and drainage  Packing material:  in gauze  Complications: No  Specimens: No  Implants: No  Patient tolerance: Patient tolerated the procedure well with no immediate complications  Comments: I&D area cleaned with Betadine.  Wound packed.  Patient tolerated procedure well          Labs Reviewed   POCT URINALYSIS W/O SCOPE - Abnormal       Result Value    Glucose, UA Negative      Bilirubin, UA Negative      Ketones, UA Negative      Spec Grav UA 1.020      Blood, UA Trace-intact (*)     PH, UA 6.0      Protein, UA Negative      Urobilinogen, UA 0.2      Nitrite, UA Negative      Leukocytes, UA 3+ (*)     Color, UA POC Yellow      Clarity, UA, POC Clear     CULTURE, AEROBIC  (SPECIFY SOURCE)   CULTURE, URINE   C. TRACHOMATIS/N. GONORRHOEAE BY AMP DNA   VAGINOSIS SCREEN BY DNA PROBE   POCT URINE PREGNANCY    POC Preg Test, Ur Negative       Acceptable Yes     SARS-COV-2 RDRP GENE    POC Rapid COVID Negative       Acceptable Yes      Narrative:     This test utilizes isothermal nucleic acid amplification technology to detect the SARS-CoV-2 RdRp nucleic acid segment. The analytical sensitivity (limit of detection) is 500 copies/swab.     A POSITIVE result is indicative of the presence of SARS-CoV-2 RNA; clinical correlation with patient history and other diagnostic information is necessary to determine patient infection  status.    A NEGATIVE result means that SARS-CoV-2 nucleic acids are not present above the limit of detection. A NEGATIVE result should be treated as presumptive. It does not rule out the possibility of COVID-19 and should not be the sole basis for treatment decisions. If COVID-19 is strongly suspected based on clinical and exposure history, re-testing using an alternate molecular assay should be considered.     Commercial kits are provided by [employer].       POCT INFLUENZA A/B MOLECULAR   POCT STREP A MOLECULAR   POCT URINALYSIS W/O SCOPE   POCT STREP A, RAPID    POC Rapid Strep A negative     POCT RAPID INFLUENZA A/B    Influenza B Ag negative      Inflenza A Ag negative            Imaging Results    None          Medications   BUPivacaine (PF) 0.5% (5 mg/mL) injection 25 mg (25 mg Subcutaneous Given 4/3/25 1416)   cefTRIAXone (Rocephin) 1 g in LIDOcaine HCL 10 mg/ml (1%) 4 mL IM only syringe (1 g Intramuscular Given 4/3/25 1413)   LIDOcaine-EPINEPHrine 1%-1:100,000 injection 1 mL (1 mL Intradermal Given 4/3/25 1414)   oxyCODONE-acetaminophen 5-325 mg per tablet 1 tablet (1 tablet Oral Given 4/3/25 1416)     Medical Decision Making  Amount and/or Complexity of Data Reviewed  Labs: ordered. Decision-making details documented in ED Course.    Risk  OTC drugs.  Prescription drug management.    Medical Decision Making:    This is an evaluation of a 19 y.o. female that presents to the Emergency Department for   Chief Complaint   Patient presents with    Groin Swelling     Pt reports vaginal swelling with drainage onset yesterday, also reports body aches and chills     The patient is a non-toxic and well appearing patient. On physical exam, patient appears well hydrated with moist mucus membranes. Breath sounds are clear and equal bilaterally with no adventitious breath sounds, tachypnea or respiratory distress. Regular rate and rhythm. No murmurs. Abdomen soft and non tender. Patient is tolerating PO without  difficulty. Physical exam otherwise as above.     I have reviewed vital signs and nursing notes.   Vital Signs Are Reassuring.     Based on the patient's symptoms, I am considering and evaluating for the following differential diagnoses: pregnancy, Flu, strep, COVID, UTI, Gonorrhea, Chlamydia, Bartholin Gland cyst.     Patient initially refused I&D procedure inside the ED. Patient reports plan to contact her OB/GYN to see if there is an available appointment today. Upon returning to speak with the patient, she reports no OB/GYN appointments are available today. At 1400, patient is agreeable to I&D performed inside the ED.     ED Course:Treatment in the ED included Physical Exam and medications given in ED  Medications   BUPivacaine (PF) 0.5% (5 mg/mL) injection 25 mg (25 mg Subcutaneous Given 4/3/25 1416)   cefTRIAXone (Rocephin) 1 g in LIDOcaine HCL 10 mg/ml (1%) 4 mL IM only syringe (1 g Intramuscular Given 4/3/25 1413)   LIDOcaine-EPINEPHrine 1%-1:100,000 injection 1 mL (1 mL Intradermal Given 4/3/25 1414)   oxyCODONE-acetaminophen 5-325 mg per tablet 1 tablet (1 tablet Oral Given 4/3/25 1416)   .   Patient reports feeling better after treatment in the ER.   Vital signs reviewed  Nurse's notes reviewed  External Data/Documents Reviewed: Previous medical records and vital signs reviewed, see HPI and Physical exam.   Labs: ordered and reviewed.  Pregnancy test negative.  UA positive for 3+ leukocytes.    Risk  Diagnosis or treatment significantly limited by the following social determinants of health: Body mass index is 18.17 kg/m².     In shared decision making with the patient, we discussed treatment, prescriptions, labs, and OBGYN follow up.  Discharge home with   ED Prescriptions       Medication Sig Dispense Start Date End Date Auth. Provider    doxycycline (VIBRAMYCIN) 100 MG Cap Take 1 capsule (100 mg total) by mouth 2 (two) times daily. for 14 days 28 capsule 4/3/2025 4/17/2025 Miryam Ruggiero DO    ibuprofen  (ADVIL,MOTRIN) 600 MG tablet Take 1 tablet (600 mg total) by mouth every 6 (six) hours as needed for Pain (Take with food as needed for mild-to-moderate pain). 20 tablet 4/3/2025 -- Miryam Ruggiero,     acetaminophen (TYLENOL) 500 MG tablet Take 1 tablet (500 mg total) by mouth every 6 (six) hours as needed for Pain. 30 tablet 4/3/2025 -- Miryam Ruggiero,     oxyCODONE-acetaminophen (PERCOCET) 5-325 mg per tablet Take 1 tablet by mouth every 4 (four) hours as needed for Pain (As needed for severe 10/10 pain). 12 tablet 4/3/2025 -- Miryam Ruggiero DO          Fill and take prescriptions as directed.  Return to the ED if symptoms worsen or do not resolve.   Answered questions and discussed discharge plan.    Patient reports resolution of symptoms and is ready for discharge.  Follow up with PCP/specialist in 1 day    The following labs and imaging were reviewed:    Admission on 04/03/2025, Discharged on 04/03/2025   Component Date Value Ref Range Status    POC Preg Test, Ur 04/03/2025 Negative  Negative Final     Acceptable 04/03/2025 Yes   Final    Glucose, UA 04/03/2025 Negative  Negative Final    Bilirubin, UA 04/03/2025 Negative  Negative Final    Ketones, UA 04/03/2025 Negative  Negative Final    Spec Grav UA 04/03/2025 1.020  1.005 - 1.030 Final    Blood, UA 04/03/2025 Trace-intact (A)  Negative Final    PH, UA 04/03/2025 6.0  5.0 - 8.0 Final    Protein, UA 04/03/2025 Negative  Negative Final    Urobilinogen, UA 04/03/2025 0.2  <=1.0 E.U./dL Final    Nitrite, UA 04/03/2025 Negative  Negative Final    Leukocytes, UA 04/03/2025 3+ (A)  Negative Final    Color, UA POC 04/03/2025 Yellow  Yellow, Straw, Meggan Final    Clarity, UA, POC 04/03/2025 Clear  Clear Final    POC Rapid COVID 04/03/2025 Negative  Negative Final     Acceptable 04/03/2025 Yes   Final    POC Rapid Strep A 04/03/2025 negative  Positive/Negative Final    Influenza B Ag 04/03/2025 negative  Positive/Negative Final     [place] ANGELA Ag 04/03/2025 negative  Positive/Negative Final        Imaging Results    None               Scribe Attestation:   Scribe #1: I performed the above scribed service and the documentation accurately describes the services I performed. I attest to the accuracy of the note.                        I, Dr. Miyram Ruggiero, personally performed the services described in this documentation. This document was produced by a scribe under my direction and in my presence. All medical record entries made by the scribe were at my direction and in my presence.  I have reviewed the chart and agree that the record reflects my personal performance and is accurate and complete. Miryam Ruggiero DO.     04/03/2025 3:23 PM      Clinical Impression:  Final diagnoses:  [N75.1] Bartholin's gland abscess (Primary)  [N89.8] Vaginal discharge  [N39.0] Acute UTI          ED Disposition Condition    Discharge Stable          ED Prescriptions       Medication Sig Dispense Start Date End Date Auth. Provider    doxycycline (VIBRAMYCIN) 100 MG Cap Take 1 capsule (100 mg total) by mouth 2 (two) times daily. for 14 days 28 capsule 4/3/2025 4/17/2025 Miryam Ruggiero DO    ibuprofen (ADVIL,MOTRIN) 600 MG tablet Take 1 tablet (600 mg total) by mouth every 6 (six) hours as needed for Pain (Take with food as needed for mild-to-moderate pain). 20 tablet 4/3/2025 -- Miryam Ruggiero DO    acetaminophen (TYLENOL) 500 MG tablet Take 1 tablet (500 mg total) by mouth every 6 (six) hours as needed for Pain. 30 tablet 4/3/2025 -- Miryam Ruggiero DO    oxyCODONE-acetaminophen (PERCOCET) 5-325 mg per tablet Take 1 tablet by mouth every 4 (four) hours as needed for Pain (As needed for severe 10/10 pain). 12 tablet 4/3/2025 -- Miryam Ruggiero DO          Follow-up Information       Follow up With Specialties Details Why Contact Info Additional Information    [facility]YN Obstetrics and Gynecology Schedule an appointment as soon as possible for a visit in 1  day For further care and evaluation of pelvic pain and cramping [address]  [address]  [facility] [address]  [phone] Please park in garage or Medical Ofc Bldg surface lot and use Medical Office Bldg elevator. If you are here for a visit with your OB/GYN, please check in on the 3rd floor, [address].  If you are here for Fetal Monitoring with the nurse, please check in on the 2nd Floor, [address].    [facility] - Emergency Dept Emergency Medicine Go to  Please go to [facility] emergency department if symptoms worsen [address]  [facility] [place] [address]  [phone]                Miryam Ruggiero DO  04/03/25 1523         [1]   Social History  Tobacco Use    Smoking status: Some Days     Types: Vaping with nicotine, Vaping w/o nicotine    Smokeless tobacco: Current   Substance Use Topics    Alcohol use: No    Drug use: No        Miryam Ruggiero DO  04/03/25 1712

## 2025-04-03 NOTE — Clinical Note
"Alessandro Boblalita" Sajan was seen and treated in our emergency department on 4/3/2025.  She may return to work on 04/06/2025.       If you have any questions or concerns, please don't hesitate to call.      Miryam Ruggiero, DO"

## 2025-04-04 ENCOUNTER — RESULTS FOLLOW-UP (OUTPATIENT)
Dept: EMERGENCY MEDICINE | Facility: HOSPITAL | Age: 20
End: 2025-04-04

## 2025-04-05 ENCOUNTER — TELEPHONE (OUTPATIENT)
Dept: EMERGENCY MEDICINE | Facility: HOSPITAL | Age: 20
End: 2025-04-05
Payer: COMMERCIAL

## 2025-04-05 LAB — BACTERIA UR CULT: ABNORMAL

## 2025-04-05 RX ORDER — CEPHALEXIN 500 MG/1
500 CAPSULE ORAL 4 TIMES DAILY
Qty: 28 CAPSULE | Refills: 0 | Status: SHIPPED | OUTPATIENT
Start: 2025-04-05 | End: 2025-04-12

## 2025-04-06 LAB — BACTERIA SPEC AEROBE CULT: ABNORMAL

## 2025-04-10 ENCOUNTER — HOSPITAL ENCOUNTER (INPATIENT)
Facility: HOSPITAL | Age: 20
LOS: 5 days | Discharge: HOME | DRG: 758 | End: 2025-04-15
Attending: EMERGENCY MEDICINE | Admitting: STUDENT IN AN ORGANIZED HEALTH CARE EDUCATION/TRAINING PROGRAM
Payer: COMMERCIAL

## 2025-04-10 DIAGNOSIS — D18.00 HEMANGIOMA, UNSPECIFIED SITE: ICD-10-CM

## 2025-04-10 DIAGNOSIS — R18.8 INTRA-ABDOMINAL FLUID COLLECTION: ICD-10-CM

## 2025-04-10 DIAGNOSIS — R18.8 INTRAABDOMINAL FLUID COLLECTION: ICD-10-CM

## 2025-04-10 DIAGNOSIS — N73.9 PELVIC ABSCESS IN FEMALE: ICD-10-CM

## 2025-04-10 DIAGNOSIS — N73.0 PID (ACUTE PELVIC INFLAMMATORY DISEASE): Primary | ICD-10-CM

## 2025-04-10 DIAGNOSIS — N75.1 BARTHOLIN'S GLAND ABSCESS: ICD-10-CM

## 2025-04-10 DIAGNOSIS — A59.9 TRICHOMONIASIS: ICD-10-CM

## 2025-04-10 DIAGNOSIS — R07.9 CHEST PAIN: ICD-10-CM

## 2025-04-10 DIAGNOSIS — K65.1 INTRA-ABDOMINAL ABSCESS: ICD-10-CM

## 2025-04-10 PROBLEM — N76.4 LABIAL ABSCESS: Status: ACTIVE | Noted: 2025-04-10

## 2025-04-10 PROBLEM — D50.9 MICROCYTIC ANEMIA: Status: ACTIVE | Noted: 2025-04-10

## 2025-04-10 PROBLEM — A59.01 TRICHOMONAL VAGINITIS: Status: ACTIVE | Noted: 2025-04-10

## 2025-04-10 LAB
ABSOLUTE EOSINOPHIL (OHS): 0.01 K/UL
ABSOLUTE MONOCYTE (OHS): 0.44 K/UL (ref 0.3–1)
ABSOLUTE NEUTROPHIL COUNT (OHS): 4.77 K/UL (ref 1.8–7.7)
ALBUMIN SERPL BCP-MCNC: 4.4 G/DL (ref 3.5–5.2)
ALP SERPL-CCNC: 49 UNIT/L (ref 40–150)
ALT SERPL W/O P-5'-P-CCNC: 10 UNIT/L (ref 10–44)
ANION GAP (OHS): 8 MMOL/L (ref 8–16)
AST SERPL-CCNC: 19 UNIT/L (ref 11–45)
B-HCG UR QL: NEGATIVE
BACTERIA #/AREA URNS AUTO: ABNORMAL /HPF
BACTERIA GENITAL QL WET PREP: ABNORMAL
BACTERIAL VAGINOSIS DNA (OHS): DETECTED
BASOPHILS # BLD AUTO: 0.04 K/UL
BASOPHILS NFR BLD AUTO: 0.6 %
BILIRUB SERPL-MCNC: 0.2 MG/DL (ref 0.1–1)
BILIRUB UR QL STRIP.AUTO: NEGATIVE
BUN SERPL-MCNC: 12 MG/DL (ref 6–20)
C TRACH DNA SPEC QL NAA+PROBE: NOT DETECTED
C TRACH DNA SPEC QL NAA+PROBE: NOT DETECTED
CALCIUM SERPL-MCNC: 10.2 MG/DL (ref 8.7–10.5)
CANDIDA GLABRATA/KRUSEI DNA (OHS): NOT DETECTED
CANDIDA SPECIES DNA (OHS): DETECTED
CHLORIDE SERPL-SCNC: 107 MMOL/L (ref 95–110)
CLARITY UR: ABNORMAL
CLUE CELLS VAG QL WET PREP: ABNORMAL
CO2 SERPL-SCNC: 22 MMOL/L (ref 23–29)
COLOR UR AUTO: YELLOW
CREAT SERPL-MCNC: 0.7 MG/DL (ref 0.5–1.4)
CTGC SOURCE (OHS) ORD-325: NORMAL
CTGC SOURCE (OHS) ORD-325: NORMAL
CTP QC/QA: YES
ERYTHROCYTE [DISTWIDTH] IN BLOOD BY AUTOMATED COUNT: 17.3 % (ref 11.5–14.5)
FILAMENT FUNGI VAG WET PREP-#/AREA: ABNORMAL
GFR SERPLBLD CREATININE-BSD FMLA CKD-EPI: >60 ML/MIN/1.73/M2
GLUCOSE SERPL-MCNC: 85 MG/DL (ref 70–110)
GLUCOSE UR QL STRIP: NEGATIVE
HCT VFR BLD AUTO: 34.1 % (ref 37–48.5)
HCV AB SERPL QL IA: NORMAL
HGB BLD-MCNC: 9.6 GM/DL (ref 12–16)
HGB UR QL STRIP: ABNORMAL
HIV 1+2 AB+HIV1 P24 AG SERPL QL IA: NORMAL
IMM GRANULOCYTES # BLD AUTO: 0.01 K/UL (ref 0–0.04)
IMM GRANULOCYTES NFR BLD AUTO: 0.1 % (ref 0–0.5)
INFLUENZA A MOLECULAR (OHS): NEGATIVE
INFLUENZA B MOLECULAR (OHS): NEGATIVE
KETONES UR QL STRIP: NEGATIVE
LACTATE SERPL-SCNC: 1.1 MMOL/L (ref 0.5–2.2)
LEUKOCYTE ESTERASE UR QL STRIP: ABNORMAL
LYMPHOCYTES # BLD AUTO: 1.57 K/UL (ref 1–4.8)
MCH RBC QN AUTO: 20.4 PG (ref 27–31)
MCHC RBC AUTO-ENTMCNC: 28.2 G/DL (ref 32–36)
MCV RBC AUTO: 72 FL (ref 82–98)
MICROSCOPIC COMMENT: ABNORMAL
N GONORRHOEA DNA UR QL NAA+PROBE: NOT DETECTED
N GONORRHOEA DNA UR QL NAA+PROBE: NOT DETECTED
NITRITE UR QL STRIP: NEGATIVE
NUCLEATED RBC (/100WBC) (OHS): 0 /100 WBC
PH UR STRIP: 6 [PH]
PLATELET # BLD AUTO: 521 K/UL (ref 150–450)
PMV BLD AUTO: 10.2 FL (ref 9.2–12.9)
POTASSIUM SERPL-SCNC: 4 MMOL/L (ref 3.5–5.1)
PROT SERPL-MCNC: 8.6 GM/DL (ref 6–8.4)
PROT UR QL STRIP: ABNORMAL
RBC # BLD AUTO: 4.71 M/UL (ref 4–5.4)
RBC #/AREA URNS AUTO: 62 /HPF (ref 0–4)
RELATIVE EOSINOPHIL (OHS): 0.1 %
RELATIVE LYMPHOCYTE (OHS): 23 % (ref 18–48)
RELATIVE MONOCYTE (OHS): 6.4 % (ref 4–15)
RELATIVE NEUTROPHIL (OHS): 69.8 % (ref 38–73)
SARS-COV-2 RDRP RESP QL NAA+PROBE: NEGATIVE
SODIUM SERPL-SCNC: 137 MMOL/L (ref 136–145)
SP GR UR STRIP: 1.02
SQUAMOUS #/AREA URNS AUTO: 5 /HPF
T PALLIDUM IGG+IGM SER QL: NORMAL
T VAGINALIS GENITAL QL WET PREP: ABNORMAL
TRICHOMONAS VAGINALIS DNA (OHS): DETECTED
UROBILINOGEN UR STRIP-ACNC: NEGATIVE EU/DL
WBC # BLD AUTO: 6.84 K/UL (ref 3.9–12.7)
WBC #/AREA URNS AUTO: >100 /HPF (ref 0–5)
WBC #/AREA VAG WET PREP: ABNORMAL
YEAST GENITAL QL WET PREP: ABNORMAL

## 2025-04-10 PROCEDURE — 83605 ASSAY OF LACTIC ACID: CPT | Performed by: PHYSICIAN ASSISTANT

## 2025-04-10 PROCEDURE — 56405 I&D VULVA/PERINEAL ABSCESS: CPT

## 2025-04-10 PROCEDURE — 96361 HYDRATE IV INFUSION ADD-ON: CPT

## 2025-04-10 PROCEDURE — 86803 HEPATITIS C AB TEST: CPT | Performed by: PHYSICIAN ASSISTANT

## 2025-04-10 PROCEDURE — 85025 COMPLETE CBC W/AUTO DIFF WBC: CPT | Performed by: PHYSICIAN ASSISTANT

## 2025-04-10 PROCEDURE — 99285 EMERGENCY DEPT VISIT HI MDM: CPT | Mod: 25

## 2025-04-10 PROCEDURE — 80053 COMPREHEN METABOLIC PANEL: CPT | Performed by: PHYSICIAN ASSISTANT

## 2025-04-10 PROCEDURE — 96375 TX/PRO/DX INJ NEW DRUG ADDON: CPT

## 2025-04-10 PROCEDURE — 25000003 PHARM REV CODE 250: Performed by: PHYSICIAN ASSISTANT

## 2025-04-10 PROCEDURE — 87389 HIV-1 AG W/HIV-1&-2 AB AG IA: CPT | Performed by: PHYSICIAN ASSISTANT

## 2025-04-10 PROCEDURE — 87070 CULTURE OTHR SPECIMN AEROBIC: CPT | Performed by: PHYSICIAN ASSISTANT

## 2025-04-10 PROCEDURE — U0002 COVID-19 LAB TEST NON-CDC: HCPCS | Performed by: PHYSICIAN ASSISTANT

## 2025-04-10 PROCEDURE — 25500020 PHARM REV CODE 255: Performed by: STUDENT IN AN ORGANIZED HEALTH CARE EDUCATION/TRAINING PROGRAM

## 2025-04-10 PROCEDURE — 0U9L0ZZ DRAINAGE OF VESTIBULAR GLAND, OPEN APPROACH: ICD-10-PCS | Performed by: OBSTETRICS & GYNECOLOGY

## 2025-04-10 PROCEDURE — 87075 CULTR BACTERIA EXCEPT BLOOD: CPT | Performed by: PHYSICIAN ASSISTANT

## 2025-04-10 PROCEDURE — A9585 GADOBUTROL INJECTION: HCPCS | Performed by: STUDENT IN AN ORGANIZED HEALTH CARE EDUCATION/TRAINING PROGRAM

## 2025-04-10 PROCEDURE — 25000003 PHARM REV CODE 250: Performed by: STUDENT IN AN ORGANIZED HEALTH CARE EDUCATION/TRAINING PROGRAM

## 2025-04-10 PROCEDURE — 12000002 HC ACUTE/MED SURGE SEMI-PRIVATE ROOM

## 2025-04-10 PROCEDURE — 81003 URINALYSIS AUTO W/O SCOPE: CPT | Performed by: PHYSICIAN ASSISTANT

## 2025-04-10 PROCEDURE — 63600175 PHARM REV CODE 636 W HCPCS: Performed by: STUDENT IN AN ORGANIZED HEALTH CARE EDUCATION/TRAINING PROGRAM

## 2025-04-10 PROCEDURE — 86593 SYPHILIS TEST NON-TREP QUANT: CPT | Performed by: PHYSICIAN ASSISTANT

## 2025-04-10 PROCEDURE — 87210 SMEAR WET MOUNT SALINE/INK: CPT | Performed by: PHYSICIAN ASSISTANT

## 2025-04-10 PROCEDURE — 81025 URINE PREGNANCY TEST: CPT | Performed by: PHYSICIAN ASSISTANT

## 2025-04-10 PROCEDURE — 87502 INFLUENZA DNA AMP PROBE: CPT | Performed by: PHYSICIAN ASSISTANT

## 2025-04-10 PROCEDURE — 63600175 PHARM REV CODE 636 W HCPCS: Mod: JZ,TB | Performed by: PHYSICIAN ASSISTANT

## 2025-04-10 PROCEDURE — 87491 CHLMYD TRACH DNA AMP PROBE: CPT | Performed by: PHYSICIAN ASSISTANT

## 2025-04-10 PROCEDURE — 87086 URINE CULTURE/COLONY COUNT: CPT | Performed by: PHYSICIAN ASSISTANT

## 2025-04-10 PROCEDURE — 96376 TX/PRO/DX INJ SAME DRUG ADON: CPT

## 2025-04-10 PROCEDURE — 25500020 PHARM REV CODE 255: Performed by: EMERGENCY MEDICINE

## 2025-04-10 PROCEDURE — 96365 THER/PROPH/DIAG IV INF INIT: CPT

## 2025-04-10 RX ORDER — ONDANSETRON HYDROCHLORIDE 2 MG/ML
4 INJECTION, SOLUTION INTRAVENOUS EVERY 8 HOURS PRN
Status: DISCONTINUED | OUTPATIENT
Start: 2025-04-10 | End: 2025-04-15 | Stop reason: HOSPADM

## 2025-04-10 RX ORDER — KETOROLAC TROMETHAMINE 30 MG/ML
10 INJECTION, SOLUTION INTRAMUSCULAR; INTRAVENOUS
Status: COMPLETED | OUTPATIENT
Start: 2025-04-10 | End: 2025-04-10

## 2025-04-10 RX ORDER — NALOXONE HCL 0.4 MG/ML
0.02 VIAL (ML) INJECTION
Status: DISCONTINUED | OUTPATIENT
Start: 2025-04-10 | End: 2025-04-15 | Stop reason: HOSPADM

## 2025-04-10 RX ORDER — HYDROMORPHONE HYDROCHLORIDE 2 MG/ML
1 INJECTION, SOLUTION INTRAMUSCULAR; INTRAVENOUS; SUBCUTANEOUS EVERY 4 HOURS PRN
Refills: 0 | Status: DISCONTINUED | OUTPATIENT
Start: 2025-04-10 | End: 2025-04-13

## 2025-04-10 RX ORDER — ONDANSETRON HYDROCHLORIDE 2 MG/ML
4 INJECTION, SOLUTION INTRAVENOUS
Status: COMPLETED | OUTPATIENT
Start: 2025-04-10 | End: 2025-04-10

## 2025-04-10 RX ORDER — METRONIDAZOLE 500 MG/100ML
500 INJECTION, SOLUTION INTRAVENOUS EVERY 12 HOURS
Status: DISCONTINUED | OUTPATIENT
Start: 2025-04-11 | End: 2025-04-11

## 2025-04-10 RX ORDER — IBUPROFEN 200 MG
24 TABLET ORAL
Status: DISCONTINUED | OUTPATIENT
Start: 2025-04-10 | End: 2025-04-15 | Stop reason: HOSPADM

## 2025-04-10 RX ORDER — MORPHINE SULFATE 4 MG/ML
4 INJECTION, SOLUTION INTRAMUSCULAR; INTRAVENOUS
Refills: 0 | Status: DISCONTINUED | OUTPATIENT
Start: 2025-04-10 | End: 2025-04-10

## 2025-04-10 RX ORDER — MORPHINE SULFATE 4 MG/ML
4 INJECTION, SOLUTION INTRAMUSCULAR; INTRAVENOUS
Refills: 0 | Status: COMPLETED | OUTPATIENT
Start: 2025-04-10 | End: 2025-04-10

## 2025-04-10 RX ORDER — DOXYCYCLINE HYCLATE 100 MG
100 TABLET ORAL EVERY 12 HOURS
Status: DISCONTINUED | OUTPATIENT
Start: 2025-04-10 | End: 2025-04-11

## 2025-04-10 RX ORDER — LIDOCAINE HYDROCHLORIDE 10 MG/ML
15 INJECTION, SOLUTION INFILTRATION; PERINEURAL
Status: ACTIVE | OUTPATIENT
Start: 2025-04-10 | End: 2025-04-11

## 2025-04-10 RX ORDER — IBUPROFEN 200 MG
16 TABLET ORAL
Status: DISCONTINUED | OUTPATIENT
Start: 2025-04-10 | End: 2025-04-15 | Stop reason: HOSPADM

## 2025-04-10 RX ORDER — CEFTRIAXONE 1 G/1
1 INJECTION, POWDER, FOR SOLUTION INTRAMUSCULAR; INTRAVENOUS
Status: COMPLETED | OUTPATIENT
Start: 2025-04-10 | End: 2025-04-10

## 2025-04-10 RX ORDER — GADOBUTROL 604.72 MG/ML
5 INJECTION INTRAVENOUS
Status: COMPLETED | OUTPATIENT
Start: 2025-04-10 | End: 2025-04-10

## 2025-04-10 RX ORDER — METRONIDAZOLE 500 MG/100ML
500 INJECTION, SOLUTION INTRAVENOUS
Status: COMPLETED | OUTPATIENT
Start: 2025-04-10 | End: 2025-04-10

## 2025-04-10 RX ORDER — ACETAMINOPHEN 500 MG
500 TABLET ORAL EVERY 6 HOURS PRN
Status: DISCONTINUED | OUTPATIENT
Start: 2025-04-10 | End: 2025-04-15 | Stop reason: HOSPADM

## 2025-04-10 RX ORDER — SODIUM CHLORIDE 0.9 % (FLUSH) 0.9 %
10 SYRINGE (ML) INJECTION EVERY 12 HOURS PRN
Status: DISCONTINUED | OUTPATIENT
Start: 2025-04-10 | End: 2025-04-15 | Stop reason: HOSPADM

## 2025-04-10 RX ORDER — LIDOCAINE HYDROCHLORIDE 10 MG/ML
15 INJECTION, SOLUTION INFILTRATION; PERINEURAL
Status: COMPLETED | OUTPATIENT
Start: 2025-04-10 | End: 2025-04-10

## 2025-04-10 RX ORDER — GLUCAGON 1 MG
1 KIT INJECTION
Status: DISCONTINUED | OUTPATIENT
Start: 2025-04-10 | End: 2025-04-15 | Stop reason: HOSPADM

## 2025-04-10 RX ORDER — HYDROCODONE BITARTRATE AND ACETAMINOPHEN 5; 325 MG/1; MG/1
1 TABLET ORAL EVERY 6 HOURS PRN
Refills: 0 | Status: DISCONTINUED | OUTPATIENT
Start: 2025-04-10 | End: 2025-04-12

## 2025-04-10 RX ADMIN — KETOROLAC TROMETHAMINE 10 MG: 30 INJECTION, SOLUTION INTRAMUSCULAR; INTRAVENOUS at 03:04

## 2025-04-10 RX ADMIN — METRONIDAZOLE 500 MG: 500 INJECTION, SOLUTION INTRAVENOUS at 11:04

## 2025-04-10 RX ADMIN — KETOROLAC TROMETHAMINE 10 MG: 30 INJECTION, SOLUTION INTRAMUSCULAR; INTRAVENOUS at 11:04

## 2025-04-10 RX ADMIN — CEFTRIAXONE 1 G: 1 INJECTION, POWDER, FOR SOLUTION INTRAMUSCULAR; INTRAVENOUS at 11:04

## 2025-04-10 RX ADMIN — GADOBUTROL 5 ML: 604.72 INJECTION INTRAVENOUS at 06:04

## 2025-04-10 RX ADMIN — ONDANSETRON 4 MG: 2 INJECTION INTRAMUSCULAR; INTRAVENOUS at 09:04

## 2025-04-10 RX ADMIN — MORPHINE SULFATE 4 MG: 4 INJECTION INTRAVENOUS at 03:04

## 2025-04-10 RX ADMIN — IOHEXOL 75 ML: 350 INJECTION, SOLUTION INTRAVENOUS at 10:04

## 2025-04-10 RX ADMIN — MORPHINE SULFATE 4 MG: 4 INJECTION INTRAVENOUS at 11:04

## 2025-04-10 RX ADMIN — MORPHINE SULFATE 4 MG: 4 INJECTION INTRAVENOUS at 09:04

## 2025-04-10 RX ADMIN — SODIUM CHLORIDE, POTASSIUM CHLORIDE, SODIUM LACTATE AND CALCIUM CHLORIDE 1000 ML: 600; 310; 30; 20 INJECTION, SOLUTION INTRAVENOUS at 01:04

## 2025-04-10 RX ADMIN — ONDANSETRON 4 MG: 2 INJECTION INTRAMUSCULAR; INTRAVENOUS at 03:04

## 2025-04-10 RX ADMIN — Medication: at 09:04

## 2025-04-10 RX ADMIN — KETOROLAC TROMETHAMINE 10 MG: 30 INJECTION, SOLUTION INTRAMUSCULAR; INTRAVENOUS at 09:04

## 2025-04-10 RX ADMIN — ONDANSETRON 4 MG: 2 INJECTION INTRAMUSCULAR; INTRAVENOUS at 06:04

## 2025-04-10 RX ADMIN — LIDOCAINE HYDROCHLORIDE 15 ML: 10 INJECTION, SOLUTION INFILTRATION; PERINEURAL at 04:04

## 2025-04-10 RX ADMIN — DOXYCYCLINE HYCLATE 100 MG: 100 TABLET, COATED ORAL at 08:04

## 2025-04-10 RX ADMIN — SODIUM CHLORIDE 1000 ML: 9 INJECTION, SOLUTION INTRAVENOUS at 11:04

## 2025-04-10 RX ADMIN — HYDROMORPHONE HYDROCHLORIDE 1 MG: 2 INJECTION, SOLUTION INTRAMUSCULAR; INTRAVENOUS; SUBCUTANEOUS at 06:04

## 2025-04-10 RX ADMIN — ONDANSETRON 4 MG: 2 INJECTION INTRAMUSCULAR; INTRAVENOUS at 11:04

## 2025-04-10 NOTE — ED PROVIDER NOTES
Encounter Date: 4/10/2025       History     Chief Complaint   Patient presents with    Abscess     Drained vaginal abscess last thur, now vomiting, and body aches sometimes urinating blood     19-year-old female presenting for abdominal pain for several days duration.  Symptoms started over a week ago with an abscess on her labia, she was seen at [place] 4/3/2025 and had a Bartholin's gland abscess drained and was given ceftriaxone and doxycycline.  She had a urine culture during that visit which grew out group B strep and Keflex was added to the regimen.  She reports that the abscess started swelling again and now she is having diffuse abdominal pain with pain with ambulation.  Reports associated fever, chills, nausea, vomiting, myalgias and flank pain.  She reports itching vaginal discharge.  She has had a mild cough and congestion.  No prior abdominal surgeries.      Review of patient's allergies indicates:  No Known Allergies  Past Medical History:   Diagnosis Date    Proteinuria, postural 2012    benign     Past Surgical History:   Procedure Laterality Date    COLONOSCOPY N/A 8/6/2021    Procedure: COLONOSCOPY;  Surgeon: Constantino Shea MD;  Location: [facility] ([address] [place] [address]);  Service: Endoscopy;  Laterality: N/A;    ESOPHAGOGASTRODUODENOSCOPY N/A 8/6/2021    Procedure: (EGD);  Surgeon: Constantino Shea MD;  Location: [facility] ([facility]);  Service: Endoscopy;  Laterality: N/A;  covid test 8/3 [facility]     Family History   Problem Relation Name Age of Onset    Sleep apnea Mother      Hypertension Mother      Mitral valve prolapse Mother      Alcohol abuse Mother      Drug abuse Mother      Alcohol abuse Father      Drug abuse Father      Mental illness Father      Congenital heart disease Sister      Learning disabilities Brother      Diabetes Maternal Grandmother      Ovarian cancer Maternal Grandmother      Hypertension Maternal Grandmother      Hyperlipidemia Maternal Grandmother      Obesity Paternal  Aunt      Diabetes Paternal Aunt      Obesity Paternal Uncle      Hypertension Paternal Uncle      Diabetes Paternal Uncle      Hyperlipidemia Paternal Uncle      Diabetes Paternal Grandmother      Hyperlipidemia Paternal Grandmother      Hypertension Paternal Grandmother      Cancer Paternal Grandmother  62        ovarian    Melanoma Neg Hx      Psoriasis Neg Hx      Lupus Neg Hx       Social History[1]  Review of Systems    Physical Exam     Initial Vitals [04/10/25 0827]   BP Pulse Resp Temp SpO2   130/81 86 18 98.4 °F (36.9 °C) 100 %      MAP       --         Physical Exam    Nursing note and vitals reviewed.  Constitutional: She appears well-developed and well-nourished. She is not diaphoretic. No distress.   HENT:   Head: Normocephalic and atraumatic.   Cardiovascular:  Normal rate, regular rhythm, normal heart sounds and intact distal pulses.     Exam reveals no gallop and no friction rub.       No murmur heard.  Pulmonary/Chest: Breath sounds normal. No respiratory distress. She has no wheezes. She has no rhonchi. She has no rales. She exhibits no tenderness.   Abdominal: Abdomen is soft. Bowel sounds are normal. She exhibits no distension and no mass. There is generalized abdominal tenderness and tenderness in the right lower quadrant.   Diffusely tender but worst in the RLQ with guarding   No right CVA tenderness.  No left CVA tenderness. There is tenderness at McBurney's point. There is no rebound, no guarding and negative Middleton's sign. positive obturator signnegative psoas sign and negative Rovsing's sign  Genitourinary: There is tenderness and lesion on the left labia. Uterus is tender. Cervix exhibits motion tenderness.    Vaginal discharge and tenderness present.   There is tenderness in the vagina.    Genitourinary Comments: RN present as chaperone for pelvic exam.  Left-sided Bartholin's gland abscess.  There is some greenish discharge in the vaginal vault.  Speculum exam deferred secondary to  patient discomfort, there is CMT and diffuse uterine tenderness         Musculoskeletal:         General: Normal range of motion.     Neurological: She is alert and oriented to person, place, and time.   Skin: Skin is warm and dry.   Psychiatric: She has a normal mood and affect.         ED Course   I & D - Incision and Drainage    Date/Time: 4/10/2025 9:48 AM  Location procedure was performed: [facility] EMERGENCY DEPARTMENT    Performed by: Kirstie Parsons PA-C  Authorized by: Kumar Segovia III, MD  Consent Done: Yes  Consent: Verbal consent obtained  Consent given by: patient  Patient identity confirmed: name and verbally with patient  Type: abscess  Body area: anogenital  Location details: Bartholin's gland  Anesthesia: local infiltration    Anesthesia:  Local Anesthetic: LET (lido,epi,tetracaine) and lidocaine 1% with epinephrine  Anesthetic total: 3 mL    Patient sedated: no  Scalpel size: 11  Incision type: single straight  Complexity: simple  Drainage: pus  Drainage amount: copious  Wound treatment: incision, drainage, expression of material and wound packed  Packing material: wick placed  Patient tolerance: Patient tolerated the procedure well with no immediate complications        Labs Reviewed   WET PREP, GENITAL - Abnormal       Result Value    WBC Many (*)     Bacteria Few (*)     Clue Cells None      TRICHOMONAS  Few (*)     BUDDING YEAST None      FUNGAL HYPHAE None     URINALYSIS, REFLEX TO URINE CULTURE - Abnormal    Color, UA Yellow      Appearance, UA Hazy (*)     pH, UA 6.0      Spec Grav UA 1.025      Protein, UA Trace (*)     Glucose, UA Negative      Ketones, UA Negative      Bilirubin, UA Negative      Blood, UA 1+ (*)     Nitrites, UA Negative      Urobilinogen, UA Negative      Leukocyte Esterase, UA 3+ (*)    COMPREHENSIVE METABOLIC PANEL - Abnormal    Sodium 137      Potassium 4.0      Chloride 107      CO2 22 (*)     Glucose 85      BUN 12      Creatinine 0.7      Calcium 10.2       Protein Total 8.6 (*)     Albumin 4.4      Bilirubin Total 0.2      ALP 49      AST 19      ALT 10      Anion Gap 8      eGFR >60     CBC WITH DIFFERENTIAL - Abnormal    WBC 6.84      RBC 4.71      HGB 9.6 (*)     HCT 34.1 (*)     MCV 72 (*)     MCH 20.4 (*)     MCHC 28.2 (*)     RDW 17.3 (*)     Platelet Count 521 (*)     MPV 10.2      Nucleated RBC 0      Neut % 69.8      Lymph % 23.0      Mono % 6.4      Eos % 0.1      Basophil % 0.6      Imm Grans % 0.1      Neut # 4.77      Lymph # 1.57      Mono # 0.44      Eos # 0.01      Baso # 0.04      Imm Grans # 0.01     URINALYSIS MICROSCOPIC - Abnormal    RBC, UA 62 (*)     WBC, UA >100 (*)     Bacteria, UA Moderate (*)     Squamous Epithelial Cells, UA 5      Microscopic Comment       INFLUENZA A & B BY MOLECULAR - Normal    INFLUENZA A MOLECULAR Negative      INFLUENZA B MOLECULAR  Negative     SARS-COV-2 RNA AMPLIFICATION, QUAL - Normal    SARS COV-2 Molecular Negative     LACTIC ACID, PLASMA - Normal    Lactic Acid Level 1.1      Narrative:     Falsely low lactic acid results can be found in samples containing >=13.0 mg/dL total bilirubin and/or >=3.5 mg/dL direct bilirubin.    HIV 1 / 2 ANTIBODY - Normal    HIV 1/2 Ag/Ab Non-Reactive     TREPONEMA PALLIDIUM ANTIBODIES IGG, IGM - Normal    Treponema Pallidum Antibodies IgG, IgM Non-Reactive     HEPATITIS C ANTIBODY - Normal    Hep C Ab Interp Non-Reactive     CULTURE, ANAEROBIC   CULTURE, AEROBIC  (SPECIFY SOURCE)   CULTURE, URINE   C. TRACHOMATIS/N. GONORRHOEAE BY AMP DNA    [facility] Source Vagina      Chlamydia, Amplified DNA Not Detected      N gonorrhoeae, amplified DNA Not Detected     CBC W/ AUTO DIFFERENTIAL    Narrative:     The following orders were created for panel order CBC auto differential.  Procedure                               Abnormality         Status                     ---------                               -----------         ------                     CBC with Differential[[id number]]        Abnormal            Final result                 Please view results for these tests on the individual orders.   POCT URINE PREGNANCY    POC Preg Test, Ur Negative       Acceptable Yes            Imaging Results              US Pelvis Complete Non OB (Final result)  Result time 04/10/25 14:02:02   Procedure changed from US Pelvis Comp with Transvag NON-OB (xpd)     Final result by Andrew Ruiz MD (04/10/25 14:02:02)                   Impression:      Uterus and ovaries are unremarkable, noting that transvaginal images were not acquired due to patient discomfort.    Bladder debris, which could represent dehydration, infection, or blood products.  Correlate with urinalysis.      Electronically signed by: Andrew Ruiz  Date:    04/10/2025  Time:    14:02               Narrative:    EXAMINATION:  US PELVIS COMPLETE NON OB    CLINICAL HISTORY:  pelvic pain;    TECHNIQUE:  Transabdominal sonography of the pelvis was performed.  Transvaginal images were not acquired due to patient discomfort.    COMPARISON:  CT abdomen pelvis 04/10/2025    FINDINGS:  Uterus:    Size: 7.9 x 5.4 x 5.4 cm    Masses: None.    Endometrium: Normal in this pre menopausal patient, measuring 10 mm.    Right ovary:    Size: 3.1 x 2.8 x 2.0 cm    Appearance: Normal.    Vascular flow: Normal.    Left ovary:    Size: 2.4 x 2.4 x 1.5 cm    Appearance: Normal.    Vascular Flow: Normal.    Free Fluid:    None.    There is echogenic debris in the bladder lumen.                                        CT Abdomen Pelvis With IV Contrast NO Oral Contrast (Final result)  Result time 04/10/25 11:10:46      Final result by Honorio Hill Jr., MD (04/10/25 11:10:46)                   Impression:      Suspected complex fluid and air collection in the presacral region extending caudally into the right.  There are some fluid and air containing loops of small bowel though these irregular collections are concerning to be extraluminal.  Correlation  with physical findings and additional evaluation will be needed.  Repeat CT study with water-soluble oral contrast to opacify the pelvic region may be helpful.    Fluid and air containing collection in the left labia likely containing a drain.  Smaller hyperenhancing region noted in the right labia.    Hypodensities in the liver.  Additional hyperenhancing nodule likely a hemangioma.  Some of the previously noted hyperenhancing hepatic nodules are not identified on today's study.    Additional findings above.    This report was flagged in Epic as abnormal.      Electronically signed by: Honorio Hill MD  Date:    04/10/2025  Time:    11:10               Narrative:    EXAMINATION:  CT ABDOMEN PELVIS WITH IV CONTRAST    CLINICAL HISTORY:  LLQ abdominal pain;RLQ abdominal pain (Age >= 14y);Concern for PID versus appendicitis versus TOA;    TECHNIQUE:  Low dose axial images, sagittal and coronal reformations were obtained from the lung bases to the pubic symphysis following the IV administration of 75 mL of Omnipaque 350 .  Oral contrast was not given.    COMPARISON:  CT abdomen pelvis June 6, 2024    FINDINGS:  In the chest, no significant pleural or pericardial fluid.  Some air in the distal esophagus either reflux or dysmotility similar.  Lung bases are clear.    In the abdomen, liver is normal in overall size.  Scattered hypodensities too small to characterize though similar.  1 cm hyperenhancing focus inferior aspect of the right lobe likely a hemangioma.  The additional previously noted hyperenhancing nodules on the right are not as evident on today's study.  The hypodensity abutting the falciform ligament likely focal fatty infiltration.  Gallbladder, pancreas, and spleen are unremarkable allowing for some motion artifact.  No adrenal masses.  Kidneys are normal in size and contour.  No hydronephrosis.    Aorta tapers normally.  No convincing para-aortic adenopathy.  Likewise in the pelvis, no pelvic adenopathy.   Nonenlarged groin nodes noted.  3.4 x 2.4 cm left labial fluid and air containing collection likely with a drain partially extending into the collection.  Additional smaller hyperenhancing area noted in the right labia.  Uterus somewhat displaced to the left with a moderate amount of endometrial fluid or thickening.  No convincing adnexal masses.    Evaluation of the bowel demonstrates some rectal wall thickening.  Significant amount of feces particular in the right colon.  Appendix is not confidently identified.  2.6 x 1.5 cm air and fluid collection in the right lower quadrant series 2, image 142 with somewhat irregular borders not convincing within a loop of bowel.  Additional air and fluid extending along the right anterior aspect of the sacrum series 2, image 117 caudally into the pelvis to image 138.  Some fluid noted in the more posterior component image 125.  Additional suspected loculated fluid and air anterior to the sacrum 3 cm image 131.  There are scattered fluid and air loops of small bowel low in the pelvis.    Portal vein, splenic vein, and SMV are patent.  Scattered nonenlarged mesenteric nodes noted.    Bones are fairly well mineralized.  Alignment is satisfactory.  No convincing lytic nor blastic lesion.                                       Medications   LETS (LIDOcaine-TETRAcaine-EPINEPHrine) gel solution (has no administration in time range)   sodium chloride 0.9% bolus 1,000 mL 1,000 mL (0 mLs Intravenous Stopped 4/10/25 1254)   ondansetron injection 4 mg (4 mg Intravenous Given 4/10/25 0918)   ketorolac injection 9.999 mg (9.999 mg Intravenous Given 4/10/25 0918)   morphine injection 4 mg (4 mg Intravenous Given 4/10/25 0918)   LETS (LIDOcaine-TETRAcaine-EPINEPHrine) gel solution ( Topical (Top) Given 4/10/25 0918)   cefTRIAXone injection 1 g (1 g Intravenous Given 4/10/25 1136)   metronidazole IVPB 500 mg (0 mg Intravenous Stopped 4/10/25 1254)   iohexoL (OMNIPAQUE 350) injection 75 mL (75  mLs Intravenous Given 4/10/25 1025)   ketorolac injection 9.999 mg (9.999 mg Intravenous Given 4/10/25 1136)   ondansetron injection 4 mg (4 mg Intravenous Given 4/10/25 1136)   morphine injection 4 mg (4 mg Intravenous Given 4/10/25 1136)   lactated ringers bolus 1,000 mL (0 mLs Intravenous Stopped 4/10/25 1500)   ketorolac injection 9.999 mg (9.999 mg Intravenous Given 4/10/25 1514)   ondansetron injection 4 mg (4 mg Intravenous Given 4/10/25 1514)   morphine injection 4 mg (4 mg Intravenous Given 4/10/25 1514)   LIDOcaine HCL 10 mg/ml (1%) injection 15 mL (15 mLs Infiltration Given by Provider 4/10/25 7298)     Medical Decision Making  19-year-old female presenting for pelvic and abdominal pain with myalgias, chills and a Bartholin's gland abscess.  Her vitals are normal, her she appears very uncomfortable though nontoxic.    Differential diagnosis:  Suspect PID versus TOA, likely secondary to gonorrhea.  She had testing done at prior facility but is not yet resulted  She does have a Bartholin's gland abscess will likely associated with gonorrhea which has reaccumulated   Additional concerns for appendicitis or pyelonephritis  She is not clinically septic    Will check labs, analgesics, incise and drain abscess, CT and reassess.    Workup is notable for positive Trichomonas test.  Results discussed with the patient.  Otherwise lab work is reassuring.  However, patient has severe pain despite multiple doses of IV narcotics.  CT shows intra-abdominal fluid collection, unable to clearly identify the appendix.  Case discussed with gynecology, pelvic ultrasound obtained.    Ultrasound does not show any evidence of TOA, however notably transvaginal portion was not performed due to patient discomfort.  Discussed the possibility of transfer, however given will etiology of the intra-abdominal fluid collection, general surgery consulted who also evaluated the patient.  They recommended IR drainage of fluid collection.   Patient will be admitted to Hospital Medicine with gyn consultation for further management.  MRI pelvis pending. Patient comfortable with admission.  I discussed this patient with my supervising physician.        Amount and/or Complexity of Data Reviewed  Labs: ordered. Decision-making details documented in ED Course.  Radiology: ordered and independent interpretation performed.    Risk  Prescription drug management.  Parenteral controlled substances.  Decision regarding hospitalization.               ED Course as of 04/10/25 1642   Thu Apr 10, 2025   0904 hCG Qualitative, Urine: Negative [CC]   0928 WBC: 6.84 [CC]   0931 Platelet Count(!): 521 [CC]   0931 Hemoglobin(!): 9.6  Anemia, stable   [CC]   0953 WBC - Vaginal Screen(!): Many [CC]   0953 Trichomonas(!): Few [CC]   0953 Bacteria - Vaginal Screen(!): Few [CC]   0959 Lactic Acid Level: 1.1 [CC]   0959 SARS COV-2 MOLECULAR: Negative [CC]   0959 Influenza A, Molecular: Negative [CC]   0959 Influenza B, Molecular: Negative [CC]   1036 WBC, UA(!): >100  Unclear if this is due to UTI versus from vaginal discharge [CC]   1100 HIV 1/2 Ag/Ab: Non-Reactive [CC]   1155 Case discussed with gynecology, recommends follow up after ultrasound.  Pelvic intra-abdominal abscess is a possibility associated with PID but hard to clarify from CT. [CC]   1431 Case discussed with transfer center [CC]   1441 Chlamydia, Amplified DNA: Not Detected [CC]   1441 N gonorrhoeae, amplified DNA: Not Detected [CC]   1444 Case discussed with OB/GYN Dr. Pike.  Recommends further imaging as it is not clearly pelvic in origin.  Will discuss again with Radiology, consult general surgery [CC]   1453 Case discussed with General surgery who will evaluate the patient [CC]   1456 Discussed again with the radiology Dr. Hill, recommends a pelvic MRI with and without contrast [CC]   1534 Case discussed again with gynecologist Dr. Pike.  Given that patient will likely require IR drainage of  intra-abdominal fluid collection, will [place] [facility] [place]necology consultation for labial abscess drainage.  If MRI shows concerning surgical findings, patient can be transferred to [facility] [place]. [CC]      ED Course User Index  [CC] Kirstie Parsons PA-C                           Clinical Impression:  Final diagnoses:  [N75.1] Bartholin's gland abscess  [A59.9] Trichomoniasis  [N73.0] PID (acute pelvic inflammatory disease) (Primary)  [N73.9] Pelvic abscess in female  [K65.1] Intra-abdominal abscess          ED Disposition Condition    Admit Stable                    [1]   Social History  Tobacco Use    Smoking status: Some Days     Types: Vaping with nicotine, Vaping w/o nicotine    Smokeless tobacco: Current   Substance Use Topics    Alcohol use: No    Drug use: No        Kirstie Parsons PA-C  04/10/25 3474

## 2025-04-10 NOTE — CONSULTS
[facility] - Emergency Dept  General Surgery  Consult Note    Patient Name: Alessandro Moeller  MRN: [id number]  Code Status: No Order  Admission Date: 4/10/2025  Hospital Length of Stay: 0 days  Attending Physician: Kumar Segovia III, MD  Primary Care Provider: Ankita, Primary Doctor    Patient information was obtained from patient and ER records.     Inpatient consult to General Surgery  Consult performed by: Ingrid Brice MD  Consult ordered by: Kirstie Parsons PA-C        Subjective:     Principal Problem: <principal problem not specified>    History of Present Illness: Miss Moeller is a 19 y.o. female who presented with pelvic pain for several days duration. Symptoms started over a week ago with an abscess on her labia, she was seen at [place] ED 4/3/2025 and had a Bartholin's gland abscess drained. She represented to [facility] ED with similar symptoms. Cyst was redrained and CT scan was obtained which showed a persistent abscess in her labia and fluid in the pelvis which may be related to pelvic inflammatory disease.    No current facility-administered medications on file prior to encounter.     Current Outpatient Medications on File Prior to Encounter   Medication Sig    acetaminophen (TYLENOL) 500 MG tablet Take 1 tablet (500 mg total) by mouth every 6 (six) hours as needed for Pain.    cephALEXin (KEFLEX) 500 MG capsule Take 1 capsule (500 mg total) by mouth 4 (four) times daily. for 7 days    doxycycline (VIBRAMYCIN) 100 MG Cap Take 1 capsule (100 mg total) by mouth 2 (two) times daily. for 14 days    ibuprofen (ADVIL,MOTRIN) 600 MG tablet Take 1 tablet (600 mg total) by mouth every 6 (six) hours as needed for Pain (Take with food as needed for mild-to-moderate pain).    oxyCODONE-acetaminophen (PERCOCET) 5-325 mg per tablet Take 1 tablet by mouth every 4 (four) hours as needed for Pain (As needed for severe 10/10 pain).    polyethylene glycol (GLYCOLAX) 17 gram/dose powder Take 17 g by mouth once daily. Take  daily to prevent constipation       Review of patient's allergies indicates:  No Known Allergies    Past Medical History:   Diagnosis Date    Proteinuria, postural 2012    benign     Past Surgical History:   Procedure Laterality Date    COLONOSCOPY N/A 8/6/2021    Procedure: COLONOSCOPY;  Surgeon: Constantino Shea MD;  Location: [facility] ENDO (2ND FLR);  Service: Endoscopy;  Laterality: N/A;    ESOPHAGOGASTRODUODENOSCOPY N/A 8/6/2021    Procedure: (EGD);  Surgeon: Constantino Shea MD;  Location: [facility] ENDO (2ND FLR);  Service: Endoscopy;  Laterality: N/A;  covid test 8/3 [facility]     Family History       Problem Relation (Age of Onset)    Alcohol abuse Mother, Father    Cancer Paternal Grandmother (62)    Congenital heart disease Sister    Diabetes Maternal Grandmother, Paternal Aunt, Paternal Uncle, Paternal Grandmother    Drug abuse Mother, Father    Hyperlipidemia Maternal Grandmother, Paternal Uncle, Paternal Grandmother    Hypertension Mother, Maternal Grandmother, Paternal Uncle, Paternal Grandmother    Learning disabilities Brother    Mental illness Father    Mitral valve prolapse Mother    Obesity Paternal Aunt, Paternal Uncle    Ovarian cancer Maternal Grandmother    Sleep apnea Mother          Tobacco Use    Smoking status: Some Days     Types: Vaping with nicotine, Vaping w/o nicotine    Smokeless tobacco: Current   Substance and Sexual Activity    Alcohol use: No    Drug use: No    Sexual activity: Yes     Partners: Female     Review of Systems  Objective:     Vital Signs (Most Recent):  Temp: 98.2 °F (36.8 °C) (04/10/25 1405)  Pulse: 66 (04/10/25 1405)  Resp: 16 (04/10/25 1405)  BP: 102/66 (04/10/25 1405)  SpO2: 98 % (04/10/25 1405) Vital Signs (24h Range):  Temp:  [98.2 °F (36.8 °C)-98.5 °F (36.9 °C)] 98.2 °F (36.8 °C)  Pulse:  [64-86] 66  Resp:  [16-18] 16  SpO2:  [98 %-100 %] 98 %  BP: (102-130)/(63-81) 102/66     Weight: 49.9 kg (110 lb)  Body mass index is 17.23 kg/m².     Physical Exam  Vitals  reviewed.   Constitutional:       General: She is not in acute distress.     Appearance: Normal appearance.   HENT:      Head: Normocephalic and atraumatic.   Cardiovascular:      Rate and Rhythm: Normal rate.   Pulmonary:      Effort: Pulmonary effort is normal. No respiratory distress.   Abdominal:      General: Abdomen is flat. There is no distension.      Tenderness: There is abdominal tenderness. There is no guarding.   Skin:     General: Skin is warm and dry.   Neurological:      General: No focal deficit present.      Mental Status: She is alert and oriented to person, place, and time.   Psychiatric:         Mood and Affect: Mood normal.         Behavior: Behavior normal.            I have reviewed all pertinent lab results within the past 24 hours.    Significant Diagnostics:  I have reviewed all pertinent imaging results/findings within the past 24 hours.    Assessment/Plan:     Abdominal pain  Miss Moeller is a 19 y.o. female  who presented with 1 week of pelvic/abdominal pain. She recently had a labial abscess drained at an outside ED. CT scan showed a fluid collection in the pelvis as well as persistent abscess in her labia.     - patient seen and examined. Labs and imaging reviewed  - recommend IR evaluation for pelvic fluid collection  - likely needs gyn evaluation for possible PID, labial abscess  - No surgical intervention warranted from from general surgery perspective        VTE Risk Mitigation (From admission, onward)      None            Thank you for your consult. I will sign off. Please contact us if you have any additional questions.    Ingrid Brice MD  General Surgery  [facility] - Emergency Dept

## 2025-04-10 NOTE — HPI
19F with past medical history of recurrent Bartholin cysts, microcytic anemia, Postural Proteinuria presenting with a 3 day history of abdominal pain.  She underwent drainage for recurrent Bartholin gland abscess on 4/3/2025, provided with Rocephin and Doxycycline, and was started on Keflex for Group b Strep urinary tract infection at same time.  She presents with a 3 day history of poorly localized abdominal pain, that is occasionally dull and occasionally sharp in nature,  is accompanied by subjective fever, chills, an episode of emesis.  She denies continued dysuria after recent UTI. She denies diarrhea, trauma to region, and sick contacts with similar symptoms. Ct abdomen Pelvis obtained in ED and findings include labial fluid and air collection, right lower quadrant fluid and air collection, and pre-sacral complex fluid and air collection.    In ED, patient is afebrile, without leukocytosis, and hemodynamically stable. General surgery was consulted and deem no surgical intervention.  Ob/Gyn consulted and performed drainage of labial abscess and recommend MRI Pelvis to assess for occult tubo-ovarian abscess.    She was given a liter of IVF, provided with morphine for pain control, given Flagyl (vaginal wet prep positive for trichomonas as well as bacteria), and will be admitted to Hospital Medicine for further assessment, treatment, as well as consideration of Interventional Radiology for drainage and sampling of fluid collection.

## 2025-04-10 NOTE — ASSESSMENT & PLAN NOTE
- vaginal Wet Prep with many WBCs, few bacteria, and few Trichomonas  - will continue Flagyl regimen

## 2025-04-10 NOTE — ASSESSMENT & PLAN NOTE
Anemia is likely due to unknown cause. Most recent hemoglobin and hematocrit are listed below.  - underwent EGD an C-scope (unremarkable with exception of acute and chronic gastritis) but questionable H. Pylori findings  - patient states imaging was performed for constipation  - repeat H. Pylori for reassessment    Recent Labs   Lab 04/10/25  0912   WBC 6.84   RBC 4.71   HGB 9.6*   HCT 34.1*   *   MCV 72*   MCH 20.4*   MCHC 28.2*       Plan  - Monitor serial CBC: Daily  - Transfuse PRBC if patient becomes hemodynamically unstable, symptomatic or H/H drops below 7/21.

## 2025-04-10 NOTE — ED TRIAGE NOTES
Patient is a 19 year old female that presents to the ED via POV with c/o abscess to her vagina, drained last week and started ABX. Pt reports dysuria, headaches, body aches, and abdominal pain.

## 2025-04-10 NOTE — Clinical Note
Diagnosis: PID (acute pelvic inflammatory disease) [476720]   Future Attending Provider: HAYDEE DOWNS [[id number]]   Reason for IP Medical Treatment  (Clinical interventions that can only be accomplished in the IP setting? ) :: Intra-abdominal abscess

## 2025-04-10 NOTE — H&P
[facility] Dept  Hospital Medicine  History & Physical    Patient Name: Alessandro Moeller  MRN: [id number]  Patient Class: IP- Inpatient  Admission Date: 4/10/2025  Attending Physician: Ascencion Solomon MD   Primary Care Provider: Ankita Primary Doctor         Patient information was obtained from patient, past medical records, and ER records.     Subjective:     Principal Problem:Intraabdominal fluid collection    Chief Complaint:   Chief Complaint   Patient presents with    Abscess     Drained vaginal abscess last thur, now vomiting, and body aches sometimes urinating blood        HPI: 19F with past medical history of recurrent Bartholin cysts, microcytic anemia, Postural Proteinuria presenting with a 3 day history of abdominal pain.  She underwent drainage for recurrent Bartholin gland abscess on 4/3/2025, provided with Rocephin and Doxycycline, and was started on Keflex for Group b Strep urinary tract infection at same time.  She presents with a 3 day history of poorly localized abdominal pain, that is occasionally dull and occasionally sharp in nature,  is accompanied by subjective fever, chills, an episode of emesis.  She denies continued dysuria after recent UTI. She denies diarrhea, trauma to region, and sick contacts with similar symptoms. Ct abdomen Pelvis obtained in ED and findings include labial fluid and air collection, right lower quadrant fluid and air collection, and pre-sacral complex fluid and air collection.    In ED, patient is afebrile, without leukocytosis, and hemodynamically stable. General surgery was consulted and deem no surgical intervention.  Ob/Gyn consulted and performed drainage of labial abscess and recommend MRI Pelvis to assess for occult tubo-ovarian abscess.    She was given a liter of IVF, provided with morphine for pain control, given Flagyl (vaginal wet prep positive for trichomonas as well as bacteria), and will be admitted to Hospital Medicine for further assessment,  treatment, as well as consideration of Interventional Radiology for drainage and sampling of fluid collection.    Past Surgical History:   Procedure Laterality Date    COLONOSCOPY N/A 8/6/2021    Procedure: COLONOSCOPY;  Surgeon: Constantino Shea MD;  Location: [facility] ENDO (2ND FLR);  Service: Endoscopy;  Laterality: N/A;    ESOPHAGOGASTRODUODENOSCOPY N/A 8/6/2021    Procedure: (EGD);  Surgeon: Constantino Shea MD;  Location: [facility] ENDO (2ND FLR);  Service: Endoscopy;  Laterality: N/A;  covid test 8/3 [facility]     Family History   Problem Relation Name Age of Onset    Sleep apnea Mother      Hypertension Mother      Mitral valve prolapse Mother      Alcohol abuse Mother      Drug abuse Mother      Alcohol abuse Father      Drug abuse Father      Mental illness Father      Congenital heart disease Sister      Learning disabilities Brother      Diabetes Maternal Grandmother      Ovarian cancer Maternal Grandmother      Hypertension Maternal Grandmother      Hyperlipidemia Maternal Grandmother      Obesity Paternal Aunt      Diabetes Paternal Aunt      Obesity Paternal Uncle      Hypertension Paternal Uncle      Diabetes Paternal Uncle      Hyperlipidemia Paternal Uncle      Diabetes Paternal Grandmother      Hyperlipidemia Paternal Grandmother      Hypertension Paternal Grandmother      Cancer Paternal Grandmother  62        ovarian    Melanoma Neg Hx      Psoriasis Neg Hx      Lupus Neg Hx       Social History[1]      Review of Systems   Constitutional:  Positive for chills and fever.   Respiratory:  Negative for cough and shortness of breath.    Cardiovascular:  Negative for chest pain, palpitations and leg swelling.   Gastrointestinal:  Positive for abdominal pain, nausea and vomiting. Negative for blood in stool, constipation and diarrhea.   Genitourinary:  Negative for dysuria.   Musculoskeletal:  Negative for back pain and myalgias.   Neurological:  Negative for sensory change, weakness and headaches.    Psychiatric/Behavioral:  Negative for depression and suicidal ideas.      Vitals:    04/10/25 1136 04/10/25 1143 04/10/25 1405 04/10/25 1514   BP:  105/63 102/66    BP Location:  Right arm     Patient Position:  Sitting     Pulse:  64 66    Resp: 18 16 16 18   Temp:  98.5 °F (36.9 °C) 98.2 °F (36.8 °C)    TempSrc:  Oral     SpO2:  99% 98%    Weight:       Height:          Physical Exam  Constitutional:       Appearance: She is not toxic-appearing.   HENT:      Head: Normocephalic and atraumatic.   Eyes:      General: No scleral icterus.     Extraocular Movements: Extraocular movements intact.   Cardiovascular:      Rate and Rhythm: Normal rate and regular rhythm.   Pulmonary:      Effort: Pulmonary effort is normal. No respiratory distress.   Abdominal:      General: Bowel sounds are normal. There is distension.      Tenderness: There is abdominal tenderness. There is no guarding or rebound.   Musculoskeletal:      Right lower leg: No edema.      Left lower leg: No edema.   Skin:     Coloration: Skin is not jaundiced.   Neurological:      General: No focal deficit present.      Mental Status: She is alert and oriented to person, place, and time.   Psychiatric:         Mood and Affect: Mood normal.         Behavior: Behavior normal.           Assessment/Plan:     Assessment & Plan  Intraabdominal fluid collection  - patient with recurrent labial abscess presenting with marked abdominal pain for 3 days with recent drainage of labial abscess  - important findings on CT Abd/Pelvis: 3.4 x 2.4 cm left labial fluid and air containing collection likely with a drain partially extending into the collection; 2.6 x 1.5 cm air and fluid collection in the right lower quadrant, and additional air and fluid extending along the right anterior aspect of the sacrum  - Gen Surg deem no surgical intervention, Gyn drained labial abscess  - consult to Interventional Radiology for drainage and sampling  of site  - patient was notably  recently on antibiotics (Doxycycline and received Rocephin for labial abscess, as well as keflex for GBS UTI); she remains on Doxy and Keflex to complete course  - given that patient is hemodynamically stable, without leukocytosis, and afebrile, in addition to desire to optimize potential fluid collection culture, will continue Doxycycline, initiate Flagyl regimen for trichomonas, but withhold IV antibiotics unless patient develops fever, has leukocytosis, or becomes hemodynamically unstable: remain with low threshold to escalate, however  - pain control and PRN anti-emetics    Abdominal pain  - see intra-abdominal fluid collection as above    Labial abscess  - recurrent abscesses, with  most recent drained one week ago and intitiation of antibiotics  - fluid collection as above  - Ob/Gyn drained current abscess  - continue Doxy as above    Trichomonal vaginitis  - vaginal Wet Prep with many WBCs, few bacteria, and few Trichomonas  - will continue Flagyl regimen    Microcytic anemia  Anemia is likely due to unknown cause. Most recent hemoglobin and hematocrit are listed below.  - underwent EGD an C-scope (unremarkable with exception of acute and chronic gastritis) but questionable H. Pylori findings  - patient states imaging was performed for constipation  - repeat H. Pylori for reassessment    Recent Labs   Lab 04/10/25  0912   WBC 6.84   RBC 4.71   HGB 9.6*   HCT 34.1*   *   MCV 72*   MCH 20.4*   MCHC 28.2*       Plan  - Monitor serial CBC: Daily  - Transfuse PRBC if patient becomes hemodynamically unstable, symptomatic or H/H drops below 7/21.    VTE Risk Mitigation (From admission, onward)           Ordered     Reason for No Pharmacological VTE Prophylaxis  Once        Comments: Procedure   Question:  Reasons:  Answer:  Physician Provided (leave comment)    04/10/25 1747     Place sequential compression device  Until discontinued         04/10/25 1747                            Ascencion Solomon  MD  Department of Hospital Medicine  [facility] - [facility] Dept               [1]   Social History  Tobacco Use    Smoking status: Some Days     Types: Vaping with nicotine, Vaping w/o nicotine    Smokeless tobacco: Current   Substance Use Topics    Alcohol use: No    Drug use: No

## 2025-04-10 NOTE — PROCEDURES
INCISION AND DRAINAGE    Date/Time: 4/10/2025 8:30 AM  Location procedure was performed: [facility] EMERGENCY DEPARTMENT    Performed by: Francisco Rodriguez MD  Authorized by: Francisco Rodriguez MD  Assisting provider: Julia Light MD  Consent Done: Yes  Consent: Verbal consent obtained  Consent given by: patient  Patient understanding: patient states understanding of the procedure being performed  Test results: test results available and properly labeled  Imaging studies: imaging studies available  Patient identity confirmed:  and name  Type: abscess  Body area: anogenital  Location details: Bartholin's gland  Anesthesia: local infiltration    Anesthesia:  Local Anesthetic: lidocaine 1% without epinephrine  Anesthetic total: 10 mL    Patient sedated: no  Risk factor: underlying major vessel and underlying major nerve  Scalpel size: 11  Incision type: single straight  Incision depth: dermal and fascia  Complexity: simple  Drainage: serosanguinous  Drainage amount: moderate  Wound treatment: expression of material  Packing material: 1/2 in iodoform gauze  Complications: No  Estimated blood loss (mL): 80  Specimens: No  Implants: No  Patient tolerance: Patient tolerated the procedure well with no immediate complications  Comments: Loculations compromised by hemostat.  Moderate serosanguinous fluid express; no appreciable fluctuance, mostly induration.    Incision depth: dermal and fascia      Francisco Rodriguez MD MS  OB/Gyn  PGY-2

## 2025-04-10 NOTE — HPI
Miss Moeller is a 19 y.o. female who presented with pelvic pain for several days duration. Symptoms started over a week ago with an abscess on her labia, she was seen at [place] [facility] 4/3/2025 and had a Bartholin's gland abscess drained. She [place] [facility] [place] with similar [place]. Cyst was redrained and CT scan was obtained which showed a persistent abscess in her labia and fluid in the pelvis which may be related to pelvic inflammatory disease.

## 2025-04-10 NOTE — ED NOTES
Bed: INT 01  Expected date: 4/10/25  Expected time: 4:18 PM  Means of arrival:   Comments:  Patient is needing an abscess drained in a room with stirrups

## 2025-04-10 NOTE — ASSESSMENT & PLAN NOTE
Miss Moeller is a 19 y.o. female  who presented with 1 week of pelvic/abdominal pain. She recently had a labial abscess drained at an outside ED. CT scan showed a fluid collection in the pelvis as well as persistent abscess in her labia.     - patient seen and examined. Labs and imaging reviewed  - recommend IR evaluation for pelvic fluid collection  - likely needs gyn evaluation for possible PID, labial abscess  - No surgical intervention warranted from from general surgery perspective

## 2025-04-10 NOTE — CONSULTS
[facility] - Emergency Dept  Obstetrics & Gynecology  Consult Note    Patient Name: Alessandro Moeller  MRN: [id number]  Admission Date: 4/10/2025  Hospital Length of Stay: 0 days  Code Status: No Order  Primary Care Provider: No, Primary Doctor  Principal Problem: Intraabdominal fluid collection    Inpatient consult to Gynecology  Consult performed by: Francisco Rodriguez MD  Consult ordered by: Kirstie Parsons PA-C        Subjective:     Chief Complaint: Bartholin's abscess and pelvic fluid collection    History of Present Illness: Ms. Moeller is a 18yo G0 presenting with several days of abdominal pain.  Per EM, symptoms started over a week ago with an abscess on her labia, she was seen at [facility] 4/3/2025 and had a Bartholin's gland abscess drained and was given ceftriaxone and doxycycline. She had a urine culture during that visit which grew out group B strep and Keflex was added to the regimen. She reports that the abscess started swelling again and now she is having diffuse abdominal pain with pain with ambulation. Reports associated fever, chills, nausea, vomiting, myalgias and flank pain. She reports itching vaginal discharge. She has had a mild cough and congestion. No prior abdominal surgeries.  We are being consulted for management of the above abscess and concern for pelvic fluid collection.    In the ED, Ms. Moeller confirms HPI similar to above.  She reports persistent vulvar pressure and pain.  She denies current N/V/F/C.  She denies dysuria.  She denies abnormal discharge.  She has no further concerns at this time.    No current facility-administered medications on file prior to encounter.     Current Outpatient Medications on File Prior to Encounter   Medication Sig    acetaminophen (TYLENOL) 500 MG tablet Take 1 tablet (500 mg total) by mouth every 6 (six) hours as needed for Pain.    cephALEXin (KEFLEX) 500 MG capsule Take 1 capsule (500 mg total) by mouth 4 (four) times daily. for 7 days    doxycycline  (VIBRAMYCIN) 100 MG Cap Take 1 capsule (100 mg total) by mouth 2 (two) times daily. for 14 days    ibuprofen (ADVIL,MOTRIN) 600 MG tablet Take 1 tablet (600 mg total) by mouth every 6 (six) hours as needed for Pain (Take with food as needed for mild-to-moderate pain).    oxyCODONE-acetaminophen (PERCOCET) 5-325 mg per tablet Take 1 tablet by mouth every 4 (four) hours as needed for Pain (As needed for severe 10/10 pain).    polyethylene glycol (GLYCOLAX) 17 gram/dose powder Take 17 g by mouth once daily. Take daily to prevent constipation     Review of patient's allergies indicates:  No Known Allergies    Past Medical History:   Diagnosis Date    Proteinuria, postural     benign     OB History    Para Term  AB Living   0 0 0 0 0 0   SAB IAB Ectopic Multiple Live Births   0 0 0 0 0     Past Surgical History:   Procedure Laterality Date    COLONOSCOPY N/A 2021    Procedure: COLONOSCOPY;  Surgeon: Constantino Shea MD;  Location: [facility] ENDO ([facility] FLR);  Service: Endoscopy;  Laterality: N/A;    ESOPHAGOGASTRODUODENOSCOPY N/A 2021    Procedure: (EGD);  Surgeon: Constantino Shea MD;  Location: [facility] ENDO (2ND FLR);  Service: Endoscopy;  Laterality: N/A;  covid test 8/3 [facility]     Family History       Problem Relation (Age of Onset)    Alcohol abuse Mother, Father    Cancer Paternal Grandmother (62)    Congenital heart disease Sister    Diabetes Maternal Grandmother, Paternal Aunt, Paternal Uncle, Paternal Grandmother    Drug abuse Mother, Father    Hyperlipidemia Maternal Grandmother, Paternal Uncle, Paternal Grandmother    Hypertension Mother, Maternal Grandmother, Paternal Uncle, Paternal Grandmother    Learning disabilities Brother    Mental illness Father    Mitral valve prolapse Mother    Obesity Paternal Aunt, Paternal Uncle    Ovarian cancer Maternal Grandmother    Sleep apnea Mother          Tobacco Use    Smoking status: Some Days     Types: Vaping with nicotine, Vaping w/o nicotine     Smokeless tobacco: Current   Substance and Sexual Activity    Alcohol use: No    Drug use: No    Sexual activity: Yes     Partners: Female     Review of Systems   Constitutional:  Negative for chills, fatigue and fever.   Respiratory:  Negative for cough and shortness of breath.    Gastrointestinal:  Positive for abdominal pain. Negative for constipation, diarrhea, nausea and vomiting.   Genitourinary:  Positive for genital sores and vaginal pain. Negative for dysuria, hematuria, vaginal bleeding and vaginal discharge.   Neurological:  Negative for headaches.     Objective:     Vital Signs (Most Recent):  Temp: 98.2 °F (36.8 °C) (04/10/25 1405)  Pulse: 66 (04/10/25 1405)  Resp: 18 (04/10/25 1514)  BP: 102/66 (04/10/25 1405)  SpO2: 98 % (04/10/25 1405) Vital Signs (24h Range):  Temp:  [98.2 °F (36.8 °C)-98.5 °F (36.9 °C)] 98.2 °F (36.8 °C)  Pulse:  [64-86] 66  Resp:  [16-18] 18  SpO2:  [98 %-100 %] 98 %  BP: (102-130)/(63-81) 102/66     Weight: 49.9 kg (110 lb)  Body mass index is 17.23 kg/m².  No LMP recorded.    Physical Exam:   Constitutional: She is oriented to person, place, and time. She appears well-developed and well-nourished. No distress.    HENT:   Head: Normocephalic and atraumatic.      Cardiovascular:  Normal rate.             Pulmonary/Chest: Effort normal. No respiratory distress.        Abdominal: Soft. She exhibits no distension.     Genitourinary:          Pelvic exam was performed with patient supine.   External genitalia lesions present. Genitalia hair distrobution normal .   Labial bartholins abnormal.There is no rash, tenderness, lesion or injury on the right labia. There is tenderness and lesion on the left labia.    Genitourinary Comments: Left Bartholin's gland abscess; previously drained and with Word catheter in place (removed); small amount of serosanguinous drainage; largely indurated w/o discreet fluctuance; mild erythema; no crepitus; probed to fascia, which is intact, and does not  tract to deep space; loculations and fibrous tissue throughout             Musculoskeletal: Normal range of motion.      Lymphadenopathy: No inguinal adenopathy noted on the right or left side.    Neurological: She is alert and oriented to person, place, and time.    Skin: Skin is warm and dry.    Psychiatric: She has a normal mood and affect. Her behavior is normal. Judgment and thought content normal.     Laboratory:  CBC:   Recent Labs   Lab 04/10/25  0912   WBC 6.84   RBC 4.71   HGB 9.6*   HCT 34.1*   *   MCV 72*   MCH 20.4*   MCHC 28.2*     Chlamydia (4/10/25): Negative    CMP:   Recent Labs   Lab 04/10/25  0912   CALCIUM 10.2   ALBUMIN 4.4      K 4.0   CO2 22*      BUN 12   CREATININE 0.7   ALKPHOS 49   ALT 10   AST 19   BILITOT 0.2     Gonorrhea (4/10/25): Negative    Recent Labs   Lab 04/10/25  0836   COLORU Yellow   SPECGRAV 1.025   PHUR 6.0   PROTEINUA Trace*   BACTERIA Moderate*   NITRITE Negative   LEUKOCYTESUR 3+*   UROBILINOGEN Negative     Diagnostic Results:  US (4/10/25): Reviewed   Uterus and ovaries are unremarkable, noting that transvaginal images were not acquired due to patient discomfort.  Bladder debris, which could represent dehydration, infection, or blood products.  Correlate with urinalysis.    CT (4/10/25): Reviewed  Suspected complex fluid and air collection in the presacral region extending caudally into the right.  There are some fluid and air containing loops of small bowel though these irregular collections are concerning to be extraluminal.  Correlation with physical findings and additional evaluation will be needed.  Repeat CT study with water-soluble oral contrast to opacify the pelvic region may be helpful.  Fluid and air containing collection in the left labia likely containing a drain.  Smaller hyperenhancing region noted in the right labia.  Hypodensities in the liver.  Additional hyperenhancing nodule likely a hemangioma.  Some of the previously noted  hyperenhancing hepatic nodules are not identified on today's study.    Assessment/Plan:     Active Diagnoses:    Diagnosis Date Noted POA    PRINCIPAL PROBLEM:  Intraabdominal fluid collection [R18.8] 04/10/2025 Yes    Labial abscess [N76.4] 04/10/2025 Yes    Trichomonal vaginitis [A59.01] 04/10/2025 Yes    Microcytic anemia [D50.9] 04/10/2025 Yes    Abdominal pain [R10.9] 08/06/2021 Yes      Problems Resolved During this Admission:     18yo G0 with left Bartholin's abscess and pelvic fluid collection.    Bartholin's abscess  - Drained in ED; please see procedure note  - 1/2'' packing in place; expect further blood and serosanguinous fluid  - Notify GYN if issues with packing; can repack PRN  - Recommend scheduled and PRN analgesia  - Cultures previously collected and pending    Pelvic fluid collection  - VSS, afebrile  - Based on CT/US unclear etiology, low suspicion for GYN origin  - Agree with MRI for further evaluation and IR consult for drainage  - In the setting of pelvic fluid collection with above abscesses, recommend broad spectrum antibiotics; defer regimen to Hospital Medicine    Trichomonal vaginitis  - In the setting of pelvic fluid collection, recommend extended course of metronidazole 500 mg bid for 14 days    Thank you for your consult. I will follow-up with patient. Please contact us if you have any additional questions.      Francisco Rodriguez MD  Obstetrics & Gynecology  PGY 2  [facility] - Emergency Dept

## 2025-04-10 NOTE — ASSESSMENT & PLAN NOTE
- patient with recurrent labial abscess presenting with marked abdominal pain for 3 days with recent drainage of labial abscess  - important findings on CT Abd/Pelvis: 3.4 x 2.4 cm left labial fluid and air containing collection likely with a drain partially extending into the collection; 2.6 x 1.5 cm air and fluid collection in the right lower quadrant, and additional air and fluid extending along the right anterior aspect of the sacrum  - Gen Surg deem no surgical intervention, Gyn drained labial abscess  - consult to Interventional Radiology for drainage and sampling  of site  - patient was notably recently on antibiotics (Doxycycline and received Rocephin for labial abscess, as well as keflex for GBS UTI); she remains on Doxy and Keflex to complete course  - given that patient is hemodynamically stable, without leukocytosis, and afebrile, in addition to desire to optimize potential fluid collection culture, will continue Doxycycline, initiate Flagyl regimen for trichomonas, but withhold IV antibiotics unless patient develops fever, has leukocytosis, or becomes hemodynamically unstable: remain with low threshold to escalate, however  - pain control and PRN anti-emetics

## 2025-04-10 NOTE — SUBJECTIVE & OBJECTIVE
No current facility-administered medications on file prior to encounter.     Current Outpatient Medications on File Prior to Encounter   Medication Sig    acetaminophen (TYLENOL) 500 MG tablet Take 1 tablet (500 mg total) by mouth every 6 (six) hours as needed for Pain.    cephALEXin (KEFLEX) 500 MG capsule Take 1 capsule (500 mg total) by mouth 4 (four) times daily. for 7 days    doxycycline (VIBRAMYCIN) 100 MG Cap Take 1 capsule (100 mg total) by mouth 2 (two) times daily. for 14 days    ibuprofen (ADVIL,MOTRIN) 600 MG tablet Take 1 tablet (600 mg total) by mouth every 6 (six) hours as needed for Pain (Take with food as needed for mild-to-moderate pain).    oxyCODONE-acetaminophen (PERCOCET) 5-325 mg per tablet Take 1 tablet by mouth every 4 (four) hours as needed for Pain (As needed for severe 10/10 pain).    polyethylene glycol (GLYCOLAX) 17 gram/dose powder Take 17 g by mouth once daily. Take daily to prevent constipation       Review of patient's allergies indicates:  No Known Allergies    Past Medical History:   Diagnosis Date    Proteinuria, postural 2012    benign     Past Surgical History:   Procedure Laterality Date    COLONOSCOPY N/A 8/6/2021    Procedure: COLONOSCOPY;  Surgeon: Constantino Shea MD;  Location: [facility] ([address] [place] [address]);  Service: Endoscopy;  Laterality: N/A;    ESOPHAGOGASTRODUODENOSCOPY N/A 8/6/2021    Procedure: (EGD);  Surgeon: Constantino Shea MD;  Location: [address]);  Service: Endoscopy;  Laterality: N/A;  covid test 8/3 [facility]     Family History       Problem Relation (Age of Onset)    Alcohol abuse Mother, Father    Cancer Paternal Grandmother (62)    Congenital heart disease Sister    Diabetes Maternal Grandmother, Paternal Aunt, Paternal Uncle, Paternal Grandmother    Drug abuse Mother, Father    Hyperlipidemia Maternal Grandmother, Paternal Uncle, Paternal Grandmother    Hypertension Mother, Maternal Grandmother, Paternal Uncle, Paternal Grandmother    Learning  disabilities Brother    Mental illness Father    Mitral valve prolapse Mother    Obesity Paternal Aunt, Paternal Uncle    Ovarian cancer Maternal Grandmother    Sleep apnea Mother          Tobacco Use    Smoking status: Some Days     Types: Vaping with nicotine, Vaping w/o nicotine    Smokeless tobacco: Current   Substance and Sexual Activity    Alcohol use: No    Drug use: No    Sexual activity: Yes     Partners: Female     Review of Systems  Objective:     Vital Signs (Most Recent):  Temp: 98.2 °F (36.8 °C) (04/10/25 1405)  Pulse: 66 (04/10/25 1405)  Resp: 16 (04/10/25 1405)  BP: 102/66 (04/10/25 1405)  SpO2: 98 % (04/10/25 1405) Vital Signs (24h Range):  Temp:  [98.2 °F (36.8 °C)-98.5 °F (36.9 °C)] 98.2 °F (36.8 °C)  Pulse:  [64-86] 66  Resp:  [16-18] 16  SpO2:  [98 %-100 %] 98 %  BP: (102-130)/(63-81) 102/66     Weight: 49.9 kg (110 lb)  Body mass index is 17.23 kg/m².     Physical Exam  Vitals reviewed.   Constitutional:       General: She is not in acute distress.     Appearance: Normal appearance.   HENT:      Head: Normocephalic and atraumatic.   Cardiovascular:      Rate and Rhythm: Normal rate.   Pulmonary:      Effort: Pulmonary effort is normal. No respiratory distress.   Abdominal:      General: Abdomen is flat. There is no distension.      Tenderness: There is abdominal tenderness. There is no guarding.   Skin:     General: Skin is warm and dry.   Neurological:      General: No focal deficit present.      Mental Status: She is alert and oriented to person, place, and time.   Psychiatric:         Mood and Affect: Mood normal.         Behavior: Behavior normal.            I have reviewed all pertinent lab results within the past 24 hours.    Significant Diagnostics:  I have reviewed all pertinent imaging results/findings within the past 24 hours.

## 2025-04-10 NOTE — ASSESSMENT & PLAN NOTE
- recurrent abscesses, with  most recent drained one week ago and intitiation of antibiotics  - fluid collection as above  - Ob/Gyn drained current abscess  - continue Doxy as above

## 2025-04-11 ENCOUNTER — E-CONSULT (OUTPATIENT)
Dept: OBSTETRICS AND GYNECOLOGY | Facility: CLINIC | Age: 20
End: 2025-04-11
Payer: COMMERCIAL

## 2025-04-11 DIAGNOSIS — N75.0 INFECTED CYST OF BARTHOLIN'S GLAND DUCT: Primary | ICD-10-CM

## 2025-04-11 LAB
ABSOLUTE EOSINOPHIL (OHS): 0 K/UL
ABSOLUTE EOSINOPHIL (OHS): 0.01 K/UL
ABSOLUTE MONOCYTE (OHS): 0.16 K/UL (ref 0.3–1)
ABSOLUTE MONOCYTE (OHS): 0.49 K/UL (ref 0.3–1)
ABSOLUTE NEUTROPHIL COUNT (OHS): 3.54 K/UL (ref 1.8–7.7)
ABSOLUTE NEUTROPHIL COUNT (OHS): 3.54 K/UL (ref 1.8–7.7)
ALBUMIN SERPL BCP-MCNC: 3 G/DL (ref 3.5–5.2)
ALP SERPL-CCNC: 38 UNIT/L (ref 40–150)
ALT SERPL W/O P-5'-P-CCNC: 8 UNIT/L (ref 10–44)
ANION GAP (OHS): 6 MMOL/L (ref 8–16)
APPEARANCE FLD: NORMAL
AST SERPL-CCNC: 14 UNIT/L (ref 11–45)
BACTERIA UR CULT: NO GROWTH
BASOPHILS # BLD AUTO: 0.02 K/UL
BASOPHILS # BLD AUTO: 0.02 K/UL
BASOPHILS NFR BLD AUTO: 0.4 %
BASOPHILS NFR BLD AUTO: 0.4 %
BILIRUB SERPL-MCNC: 0.1 MG/DL (ref 0.1–1)
BUN SERPL-MCNC: 12 MG/DL (ref 6–20)
CALCIUM SERPL-MCNC: 8.8 MG/DL (ref 8.7–10.5)
CHLORIDE SERPL-SCNC: 109 MMOL/L (ref 95–110)
CO2 SERPL-SCNC: 24 MMOL/L (ref 23–29)
COLOR FLD: NORMAL
CREAT SERPL-MCNC: 0.7 MG/DL (ref 0.5–1.4)
EOSINOPHIL NFR FLD MANUAL: 2 %
ERYTHROCYTE [DISTWIDTH] IN BLOOD BY AUTOMATED COUNT: 17.3 % (ref 11.5–14.5)
ERYTHROCYTE [DISTWIDTH] IN BLOOD BY AUTOMATED COUNT: 17.4 % (ref 11.5–14.5)
FERRITIN SERPL-MCNC: 6 NG/ML (ref 20–300)
GFR SERPLBLD CREATININE-BSD FMLA CKD-EPI: >60 ML/MIN/1.73/M2
GLUCOSE SERPL-MCNC: 98 MG/DL (ref 70–110)
HCT VFR BLD AUTO: 24.7 % (ref 37–48.5)
HCT VFR BLD AUTO: 30.6 % (ref 37–48.5)
HGB BLD-MCNC: 7 GM/DL (ref 12–16)
HGB BLD-MCNC: 8.6 GM/DL (ref 12–16)
IMM GRANULOCYTES # BLD AUTO: 0.02 K/UL (ref 0–0.04)
IMM GRANULOCYTES # BLD AUTO: 0.03 K/UL (ref 0–0.04)
IMM GRANULOCYTES NFR BLD AUTO: 0.4 % (ref 0–0.5)
IMM GRANULOCYTES NFR BLD AUTO: 0.6 % (ref 0–0.5)
INR PPP: 1 (ref 0.8–1.2)
IRON SATN MFR SERPL: 3 % (ref 20–50)
IRON SERPL-MCNC: 12 UG/DL (ref 30–160)
LYMPHOCYTES # BLD AUTO: 1.33 K/UL (ref 1–4.8)
LYMPHOCYTES # BLD AUTO: 1.59 K/UL (ref 1–4.8)
LYMPHOCYTES NFR FLD MANUAL: 7 %
MCH RBC QN AUTO: 20.6 PG (ref 27–31)
MCH RBC QN AUTO: 20.7 PG (ref 27–31)
MCHC RBC AUTO-ENTMCNC: 28.1 G/DL (ref 32–36)
MCHC RBC AUTO-ENTMCNC: 28.3 G/DL (ref 32–36)
MCV RBC AUTO: 73 FL (ref 82–98)
MCV RBC AUTO: 74 FL (ref 82–98)
MONOS+MACROS NFR FLD MANUAL: 6 %
NEUTROPHILS NFR FLD MANUAL: 85 %
NUCLEATED RBC (/100WBC) (OHS): 0 /100 WBC
NUCLEATED RBC (/100WBC) (OHS): 0 /100 WBC
PLATELET # BLD AUTO: 333 K/UL (ref 150–450)
PLATELET # BLD AUTO: 417 K/UL (ref 150–450)
PLATELET BLD QL SMEAR: NORMAL
PMV BLD AUTO: 10.8 FL (ref 9.2–12.9)
PMV BLD AUTO: 10.9 FL (ref 9.2–12.9)
POTASSIUM SERPL-SCNC: 3.6 MMOL/L (ref 3.5–5.1)
PROT SERPL-MCNC: 6 GM/DL (ref 6–8.4)
PROTHROMBIN TIME: 11.2 SECONDS (ref 9–12.5)
RBC # BLD AUTO: 3.4 M/UL (ref 4–5.4)
RBC # BLD AUTO: 4.15 M/UL (ref 4–5.4)
RELATIVE EOSINOPHIL (OHS): 0 %
RELATIVE EOSINOPHIL (OHS): 0.2 %
RELATIVE LYMPHOCYTE (OHS): 24.6 % (ref 18–48)
RELATIVE LYMPHOCYTE (OHS): 29.7 % (ref 18–48)
RELATIVE MONOCYTE (OHS): 3 % (ref 4–15)
RELATIVE MONOCYTE (OHS): 9.1 % (ref 4–15)
RELATIVE NEUTROPHIL (OHS): 65.5 % (ref 38–73)
RELATIVE NEUTROPHIL (OHS): 66.1 % (ref 38–73)
SODIUM SERPL-SCNC: 139 MMOL/L (ref 136–145)
TIBC SERPL-MCNC: 406 UG/DL (ref 250–450)
TRANSFERRIN SERPL-MCNC: 274 MG/DL (ref 200–375)
TRANSFERRIN SERPL-MCNC: 274 MG/DL (ref 200–375)
VIT B12 SERPL-MCNC: 357 PG/ML (ref 210–950)
WBC # BLD AUTO: 5.35 K/UL (ref 3.9–12.7)
WBC # BLD AUTO: 5.4 K/UL (ref 3.9–12.7)
WBC # FLD: 45 /CU MM

## 2025-04-11 PROCEDURE — 89051 BODY FLUID CELL COUNT: CPT | Performed by: STUDENT IN AN ORGANIZED HEALTH CARE EDUCATION/TRAINING PROGRAM

## 2025-04-11 PROCEDURE — 87070 CULTURE OTHR SPECIMN AEROBIC: CPT | Performed by: STUDENT IN AN ORGANIZED HEALTH CARE EDUCATION/TRAINING PROGRAM

## 2025-04-11 PROCEDURE — 63600175 PHARM REV CODE 636 W HCPCS: Performed by: STUDENT IN AN ORGANIZED HEALTH CARE EDUCATION/TRAINING PROGRAM

## 2025-04-11 PROCEDURE — 85025 COMPLETE CBC W/AUTO DIFF WBC: CPT | Performed by: STUDENT IN AN ORGANIZED HEALTH CARE EDUCATION/TRAINING PROGRAM

## 2025-04-11 PROCEDURE — 80053 COMPREHEN METABOLIC PANEL: CPT | Performed by: STUDENT IN AN ORGANIZED HEALTH CARE EDUCATION/TRAINING PROGRAM

## 2025-04-11 PROCEDURE — 85610 PROTHROMBIN TIME: CPT | Performed by: PHYSICIAN ASSISTANT

## 2025-04-11 PROCEDURE — 11000001 HC ACUTE MED/SURG PRIVATE ROOM

## 2025-04-11 PROCEDURE — 85025 COMPLETE CBC W/AUTO DIFF WBC: CPT | Performed by: PHYSICIAN ASSISTANT

## 2025-04-11 PROCEDURE — 84466 ASSAY OF TRANSFERRIN: CPT | Performed by: STUDENT IN AN ORGANIZED HEALTH CARE EDUCATION/TRAINING PROGRAM

## 2025-04-11 PROCEDURE — 82728 ASSAY OF FERRITIN: CPT | Performed by: STUDENT IN AN ORGANIZED HEALTH CARE EDUCATION/TRAINING PROGRAM

## 2025-04-11 PROCEDURE — 25000003 PHARM REV CODE 250: Performed by: STUDENT IN AN ORGANIZED HEALTH CARE EDUCATION/TRAINING PROGRAM

## 2025-04-11 PROCEDURE — 83540 ASSAY OF IRON: CPT | Performed by: STUDENT IN AN ORGANIZED HEALTH CARE EDUCATION/TRAINING PROGRAM

## 2025-04-11 PROCEDURE — 82607 VITAMIN B-12: CPT | Performed by: STUDENT IN AN ORGANIZED HEALTH CARE EDUCATION/TRAINING PROGRAM

## 2025-04-11 PROCEDURE — 87102 FUNGUS ISOLATION CULTURE: CPT | Performed by: STUDENT IN AN ORGANIZED HEALTH CARE EDUCATION/TRAINING PROGRAM

## 2025-04-11 PROCEDURE — 87205 SMEAR GRAM STAIN: CPT | Performed by: STUDENT IN AN ORGANIZED HEALTH CARE EDUCATION/TRAINING PROGRAM

## 2025-04-11 PROCEDURE — 99284 EMERGENCY DEPT VISIT MOD MDM: CPT | Mod: ,,, | Performed by: PHYSICIAN ASSISTANT

## 2025-04-11 PROCEDURE — 87075 CULTR BACTERIA EXCEPT BLOOD: CPT | Performed by: STUDENT IN AN ORGANIZED HEALTH CARE EDUCATION/TRAINING PROGRAM

## 2025-04-11 RX ORDER — HYDROMORPHONE HYDROCHLORIDE 2 MG/ML
INJECTION, SOLUTION INTRAMUSCULAR; INTRAVENOUS; SUBCUTANEOUS
Status: DISPENSED
Start: 2025-04-11 | End: 2025-04-12

## 2025-04-11 RX ORDER — FERROUS GLUCONATE 324(37.5)
324 TABLET ORAL
Status: DISCONTINUED | OUTPATIENT
Start: 2025-04-12 | End: 2025-04-15 | Stop reason: HOSPADM

## 2025-04-11 RX ORDER — FENTANYL CITRATE 50 UG/ML
INJECTION, SOLUTION INTRAMUSCULAR; INTRAVENOUS
Status: COMPLETED | OUTPATIENT
Start: 2025-04-11 | End: 2025-04-11

## 2025-04-11 RX ORDER — MIDAZOLAM HYDROCHLORIDE 1 MG/ML
INJECTION, SOLUTION INTRAMUSCULAR; INTRAVENOUS
Status: COMPLETED | OUTPATIENT
Start: 2025-04-11 | End: 2025-04-11

## 2025-04-11 RX ADMIN — HYDROCODONE BITARTRATE AND ACETAMINOPHEN 1 TABLET: 5; 325 TABLET ORAL at 09:04

## 2025-04-11 RX ADMIN — HYDROMORPHONE HYDROCHLORIDE 1 MG: 2 INJECTION, SOLUTION INTRAMUSCULAR; INTRAVENOUS; SUBCUTANEOUS at 01:04

## 2025-04-11 RX ADMIN — VANCOMYCIN HYDROCHLORIDE 1000 MG: 1 INJECTION, POWDER, LYOPHILIZED, FOR SOLUTION INTRAVENOUS at 10:04

## 2025-04-11 RX ADMIN — FENTANYL CITRATE 50 MCG: 50 INJECTION, SOLUTION INTRAMUSCULAR; INTRAVENOUS at 12:04

## 2025-04-11 RX ADMIN — HYDROCODONE BITARTRATE AND ACETAMINOPHEN 1 TABLET: 5; 325 TABLET ORAL at 02:04

## 2025-04-11 RX ADMIN — MIDAZOLAM 1 MG: 1 INJECTION INTRAMUSCULAR; INTRAVENOUS at 12:04

## 2025-04-11 RX ADMIN — PIPERACILLIN SODIUM AND TAZOBACTAM SODIUM 4.5 G: 4; .5 INJECTION, POWDER, FOR SOLUTION INTRAVENOUS at 04:04

## 2025-04-11 RX ADMIN — PIPERACILLIN SODIUM AND TAZOBACTAM SODIUM 4.5 G: 4; .5 INJECTION, POWDER, FOR SOLUTION INTRAVENOUS at 11:04

## 2025-04-11 RX ADMIN — HYDROMORPHONE HYDROCHLORIDE 1 MG: 2 INJECTION, SOLUTION INTRAMUSCULAR; INTRAVENOUS; SUBCUTANEOUS at 06:04

## 2025-04-11 RX ADMIN — DOXYCYCLINE HYCLATE 100 MG: 100 TABLET, COATED ORAL at 09:04

## 2025-04-11 RX ADMIN — METRONIDAZOLE 500 MG: 5 INJECTION, SOLUTION INTRAVENOUS at 09:04

## 2025-04-11 NOTE — ASSESSMENT & PLAN NOTE
- patient with recurrent labial abscess presenting with marked abdominal pain for 3 days with recent drainage of labial abscess  - important findings on CT Abd/Pelvis: 3.4 x 2.4 cm left labial fluid and air containing collection likely with a drain partially extending into the collection; 2.6 x 1.5 cm air and fluid collection in the right lower quadrant, and additional air and fluid extending along the right anterior aspect of the sacrum  - Gen Surg deem no surgical intervention, Gyn drained labial abscess  - consult to Interventional Radiology for drainage and sampling  of site  - patient was notably recently on antibiotics (Doxycycline and received Rocephin for labial abscess, as well as keflex for GBS UTI); she remains on Doxy and Keflex to complete course  - given that patient is hemodynamically stable, without leukocytosis, and afebrile, in addition to desire to optimize potential fluid collection culture, initially had regimen of Doxycycline, Flagyl (for trichomonas) and held IV antibiotics so as not to confound study samples of fluids; now s/p sample collection with Interventional Radiology, it would be prudent to initiate broad spectrum antibiotics: Vancomycin and Zosyn  - pain control and PRN anti-emetics  - will follow up fluid studies

## 2025-04-11 NOTE — ASSESSMENT & PLAN NOTE
- recurrent abscesses, with  most recent drained one week ago and intitiation of antibiotics  - fluid collection as above  - Ob/Gyn drained current abscess  - transitioning from Doxycycline to broader spectrum

## 2025-04-11 NOTE — SUBJECTIVE & OBJECTIVE
Interval History: Patient tolerated procedure with IR well. Abdominal pain and exam with improvement and patient looks overall better.  She is iron deficient anemic and approaching threshold for transfusion. She has a good appetite and no additional complaints at this time.    Review of Systems   Constitutional:  Negative for chills and fever.   HENT:  Negative for trouble swallowing.    Respiratory:  Negative for cough and shortness of breath.    Cardiovascular:  Negative for chest pain and palpitations.   Gastrointestinal:  Positive for abdominal distention and abdominal pain. Negative for blood in stool, diarrhea, nausea and vomiting.   Musculoskeletal:  Negative for arthralgias and back pain.   Neurological:  Negative for light-headedness and headaches.   Psychiatric/Behavioral:  Negative for agitation and behavioral problems.      Objective:     Vital Signs (Most Recent):  Temp: 98.4 °F (36.9 °C) (04/11/25 1435)  Pulse: 67 (04/11/25 1435)  Resp: 16 (04/11/25 1445)  BP: (!) 121/59 (04/11/25 1435)  SpO2: 98 % (04/11/25 1435) Vital Signs (24h Range):  Temp:  [97.8 °F (36.6 °C)-98.8 °F (37.1 °C)] 98.4 °F (36.9 °C)  Pulse:  [60-89] 67  Resp:  [14-23] 16  SpO2:  [97 %-100 %] 98 %  BP: (101-131)/(55-75) 121/59     Weight: 49.3 kg (108 lb 11 oz)  Body mass index is 17.02 kg/m².    Intake/Output Summary (Last 24 hours) at 4/11/2025 1527  Last data filed at 4/11/2025 1244  Gross per 24 hour   Intake 500 ml   Output 0.1 ml   Net 499.9 ml         Physical Exam  Constitutional:       General: She is not in acute distress.     Appearance: She is not ill-appearing or toxic-appearing.   HENT:      Head: Normocephalic and atraumatic.   Eyes:      General: No scleral icterus.     Extraocular Movements: Extraocular movements intact.   Cardiovascular:      Rate and Rhythm: Normal rate and regular rhythm.   Pulmonary:      Effort: Pulmonary effort is normal. No respiratory distress.   Abdominal:      General: Bowel sounds are  normal.      Comments: Distension present; mild TTP in LUQ but in fewer quadrants and to lesser degree than yesterday   Musculoskeletal:      Right lower leg: No edema.      Left lower leg: No edema.   Neurological:      General: No focal deficit present.      Mental Status: She is alert and oriented to person, place, and time.   Psychiatric:         Mood and Affect: Mood normal.         Behavior: Behavior normal.               Significant Labs: All pertinent labs within the past 24 hours have been reviewed.  CBC:   Recent Labs   Lab 04/10/25  0903 04/10/25  0912 04/11/25  0510   WBC Many* 6.84 5.40   HGB  --  9.6* 7.0*   HCT  --  34.1* 24.7*   PLT  --  521* 333     CMP:   Recent Labs   Lab 04/10/25  0912 04/11/25  0510    139   K 4.0 3.6    109   CO2 22* 24   BUN 12 12   CREATININE 0.7 0.7   CALCIUM 10.2 8.8   ALBUMIN 4.4 3.0*   BILITOT 0.2 0.1   ALKPHOS 49 38*   AST 19 14   ALT 10 8*   ANIONGAP 8 6*       Significant Imaging: I have reviewed all pertinent imaging results/findings within the past 24 hours.

## 2025-04-11 NOTE — CONSULTS
[facility]  Response for E-Consult     Patient Name: Alessandro Moeller  MRN: [id number]  Primary Care Provider: No, Primary Doctor   Requesting Provider: Ascencion Solomon MD  Consults    Recommendation: Per the consult note from yesterday, she needs to be treated for vaginal infection with Trichomonas with Flagyl 500 mg BID x 7 days. The vulvar abscess has been drained and cultured. If she is on broad spectrum antibiotics for her abdominal abscess, then this will cover for any potential infection in the Bartholin's gland. Otherwise, main treatment for the vulvar abscess is local I+D which has already been performed.     Additional future steps to consider: following up cultures from Bartholin's gland and culture from IR drainage.     Total time of Consultation: 10 minute    I did not speak to the requesting provider verbally about this.     *This eConsult is based on the clinical data available to me and is furnished without benefit of a physical examination. The eConsult will need to be interpreted in light of any clinical issues or changes in patient status not available to me at the time of filing this eConsults. Significant changes in patient condition or level of acuity should result in immediate formal consultation and reevaluation. Please alert me if you have further questions.    Thank you for this eConsult referral.     MD Arnaldo CorcoranBETHANIE

## 2025-04-11 NOTE — BRIEF OP NOTE
Radiology Post-Procedure Note    Pre Op Diagnosis: fluid collection    Post Op Diagnosis: same    Procedure: attempted fluid aspiration     Procedure performed by: Nayla Foster MD    Written Informed Consent Obtained: Yes    Specimen Removed: yes    Estimated Blood Loss: Minimal    Findings:   Using CT guidance a 19 g needle was advanced into the area of concern which had decreased from prior imaging and at the time of procedure the fluid collection had decreased again due to increased gas in the rectum. 0.1ml removed. . Specimen sent to pathology.     Patient tolerated procedure well.    Nayla Foster MD  Interventional Radiology

## 2025-04-11 NOTE — ED NOTES
Received report and assumed care of patient at this time. Patient is AAOx4 with a calm affect and aware of environment. Airway is open and patent, respirations are spontaneous, normal effort and rate noted. Pt denies chest pain at this time. Skin warm and dry. Movement to all extremities noted. Pt c/o 8/10 pelvic pain. Bed placed in low position, side rails up x 2, call light is within reach of patient. Explanation of care provided to patient. Awaiting further MD orders and bed assignment, POC continues.

## 2025-04-11 NOTE — PROGRESS NOTES
[facility] - Emergency Dept  Hospital Medicine  Progress Note    Patient Name: Alessandro Moeller  MRN: [id number]  Patient Class: IP- Inpatient   Admission Date: 4/10/2025  Length of Stay: 1 days  Attending Physician: Ascencion Solomon MD  Primary Care Provider: Ankita, Primary Doctor        Subjective     Principal Problem:Intraabdominal fluid collection        HPI:  19F with past medical history of recurrent Bartholin cysts, microcytic anemia, Postural Proteinuria presenting with a 3 day history of abdominal pain.  She underwent drainage for recurrent Bartholin gland abscess on 4/3/2025, provided with Rocephin and Doxycycline, and was started on Keflex for Group b Strep urinary tract infection at same time.  She presents with a 3 day history of poorly localized abdominal pain, that is occasionally dull and occasionally sharp in nature,  is accompanied by subjective fever, chills, an episode of emesis.  She denies continued dysuria after recent UTI. She denies diarrhea, trauma to region, and sick contacts with similar symptoms. Ct abdomen Pelvis obtained in ED and findings include labial fluid and air collection, right lower quadrant fluid and air collection, and pre-sacral complex fluid and air collection.    In ED, patient is afebrile, without leukocytosis, and hemodynamically stable. General surgery was consulted and deem no surgical intervention.  Ob/Gyn consulted and performed drainage of labial abscess and recommend MRI Pelvis to assess for occult tubo-ovarian abscess.    She was given a liter of IVF, provided with morphine for pain control, given Flagyl (vaginal wet prep positive for trichomonas as well as bacteria), and will be admitted to Hospital Medicine for further assessment, treatment, as well as consideration of Interventional Radiology for drainage and sampling of fluid collection.    Overview/Hospital Course:  Patient underwent sample collection with Interventional Radiology and studies sent.  Difficulty in  finding window yielded small fluid collection, yielding more diagnostic than therapeutic results.  Despite patient being without leukocytosis, afebrile, and hemodynamically stable, given that nature of fluid accumulation is unknown, it would be prudent to initiate broad spectrum antibiotics.    Interval History: Patient tolerated procedure with IR well. Abdominal pain and exam with improvement and patient looks overall better.  She is iron deficient anemic and approaching threshold for transfusion. She has a good appetite and no additional complaints at this time.    Review of Systems   Constitutional:  Negative for chills and fever.   HENT:  Negative for trouble swallowing.    Respiratory:  Negative for cough and shortness of breath.    Cardiovascular:  Negative for chest pain and palpitations.   Gastrointestinal:  Positive for abdominal distention and abdominal pain. Negative for blood in stool, diarrhea, nausea and vomiting.   Musculoskeletal:  Negative for arthralgias and back pain.   Neurological:  Negative for light-headedness and headaches.   Psychiatric/Behavioral:  Negative for agitation and behavioral problems.      Objective:     Vital Signs (Most Recent):  Temp: 98.4 °F (36.9 °C) (04/11/25 1435)  Pulse: 67 (04/11/25 1435)  Resp: 16 (04/11/25 1445)  BP: (!) 121/59 (04/11/25 1435)  SpO2: 98 % (04/11/25 1435) Vital Signs (24h Range):  Temp:  [97.8 °F (36.6 °C)-98.8 °F (37.1 °C)] 98.4 °F (36.9 °C)  Pulse:  [60-89] 67  Resp:  [14-23] 16  SpO2:  [97 %-100 %] 98 %  BP: (101-131)/(55-75) 121/59     Weight: 49.3 kg (108 lb 11 oz)  Body mass index is 17.02 kg/m².    Intake/Output Summary (Last 24 hours) at 4/11/2025 1527  Last data filed at 4/11/2025 1244  Gross per 24 hour   Intake 500 ml   Output 0.1 ml   Net 499.9 ml         Physical Exam  Constitutional:       General: She is not in acute distress.     Appearance: She is not ill-appearing or toxic-appearing.   HENT:      Head: Normocephalic and atraumatic.    Eyes:      General: No scleral icterus.     Extraocular Movements: Extraocular movements intact.   Cardiovascular:      Rate and Rhythm: Normal rate and regular rhythm.   Pulmonary:      Effort: Pulmonary effort is normal. No respiratory distress.   Abdominal:      General: Bowel sounds are normal.      Comments: Distension present; mild TTP in LUQ but in fewer quadrants and to lesser degree than yesterday   Musculoskeletal:      Right lower leg: No edema.      Left lower leg: No edema.   Neurological:      General: No focal deficit present.      Mental Status: She is alert and oriented to person, place, and time.   Psychiatric:         Mood and Affect: Mood normal.         Behavior: Behavior normal.               Significant Labs: All pertinent labs within the past 24 hours have been reviewed.  CBC:   Recent Labs   Lab 04/10/25  0903 04/10/25  0912 04/11/25  0510   WBC Many* 6.84 5.40   HGB  --  9.6* 7.0*   HCT  --  34.1* 24.7*   PLT  --  521* 333     CMP:   Recent Labs   Lab 04/10/25  0912 04/11/25  0510    139   K 4.0 3.6    109   CO2 22* 24   BUN 12 12   CREATININE 0.7 0.7   CALCIUM 10.2 8.8   ALBUMIN 4.4 3.0*   BILITOT 0.2 0.1   ALKPHOS 49 38*   AST 19 14   ALT 10 8*   ANIONGAP 8 6*       Significant Imaging: I have reviewed all pertinent imaging results/findings within the past 24 hours.      Assessment & Plan  Intraabdominal fluid collection  - patient with recurrent labial abscess presenting with marked abdominal pain for 3 days with recent drainage of labial abscess  - important findings on CT Abd/Pelvis: 3.4 x 2.4 cm left labial fluid and air containing collection likely with a drain partially extending into the collection; 2.6 x 1.5 cm air and fluid collection in the right lower quadrant, and additional air and fluid extending along the right anterior aspect of the sacrum  - Gen Surg deem no surgical intervention, Gyn drained labial abscess  - consult to Interventional Radiology for drainage  and sampling  of site  - patient was notably recently on antibiotics (Doxycycline and received Rocephin for labial abscess, as well as keflex for GBS UTI); she remains on Doxy and Keflex to complete course  - given that patient is hemodynamically stable, without leukocytosis, and afebrile, in addition to desire to optimize potential fluid collection culture, initially had regimen of Doxycycline, Flagyl (for trichomonas) and held IV antibiotics so as not to confound study samples of fluids; now s/p sample collection with Interventional Radiology, it would be prudent to initiate broad spectrum antibiotics: Vancomycin and Zosyn  - pain control and PRN anti-emetics  - will follow up fluid studies    Abdominal pain  - see intra-abdominal fluid collection as above    Labial abscess  - recurrent abscesses, with  most recent drained one week ago and intitiation of antibiotics  - fluid collection as above  - Ob/Gyn drained current abscess  - transitioning from Doxycycline to broader spectrum    Trichomonal vaginitis  - vaginal Wet Prep with many WBCs, few bacteria, and few Trichomonas  - will stop Flagyl regimen as broad spectrum, including Vanc/Zosyn to cover    Microcytic anemia  Anemia is likely due to unknown cause. Most recent hemoglobin and hematocrit are listed below.  - underwent EGD an C-scope (unremarkable with exception of acute and chronic gastritis) but questionable H. Pylori findings  - patient states imaging was performed for constipation  - repeat H. Pylori for reassessment  - awaiting repeat CBC to ensure below lab accurate; type and screen and getting consent for transfusion in event it is accurate; consideration of serial CBC if appropriate  - found to be markedly iron deficient and initiating replacement regimen    Recent Labs   Lab 04/11/25  0510   WBC 5.40   RBC 3.40*   HGB 7.0*   HCT 24.7*      MCV 73*   MCH 20.6*   MCHC 28.3*       Plan  - Monitor serial CBC: Daily  - Transfuse PRBC if patient  becomes hemodynamically unstable, symptomatic or H/H drops below 7/21.    BMI < 18.5      VTE Risk Mitigation (From admission, onward)           Ordered     Reason for No Pharmacological VTE Prophylaxis  Once        Comments: Procedure   Question:  Reasons:  Answer:  Physician Provided (leave comment)    04/10/25 1747     Place sequential compression device  Until discontinued         04/10/25 1747                    Discharge Planning   ZENAIDA:      Code Status: Full Code   Medical Readiness for Discharge Date:                            Ascencion Solomon MD  Department of Hospital Medicine   [facility] - Emergency Dept

## 2025-04-11 NOTE — ASSESSMENT & PLAN NOTE
Anemia is likely due to unknown cause. Most recent hemoglobin and hematocrit are listed below.  - underwent EGD an C-scope (unremarkable with exception of acute and chronic gastritis) but questionable H. Pylori findings  - patient states imaging was performed for constipation  - repeat H. Pylori for reassessment  - awaiting repeat CBC to ensure below lab accurate; type and screen and getting consent for transfusion in event it is accurate; consideration of serial CBC if appropriate  - found to be markedly iron deficient and initiating replacement regimen    Recent Labs   Lab 04/11/25  0510   WBC 5.40   RBC 3.40*   HGB 7.0*   HCT 24.7*      MCV 73*   MCH 20.6*   MCHC 28.3*       Plan  - Monitor serial CBC: Daily  - Transfuse PRBC if patient becomes hemodynamically unstable, symptomatic or H/H drops below 7/21.

## 2025-04-11 NOTE — PLAN OF CARE
Pt arrived to 173 for sacral aspiration. Pt oriented to unit and staff, Pt safely transferred from stretcher to procedural table. Fall risk reviewed and comfort measures utilized with interventions. Safety strap applied, position pillows to minimize pressure points. Blankets applied. Pt prepped and draped utilizing standard sterile technique. Patient placed on continuous monitoring, as required by sedation policy. Timeouts implemented utilizing standard universal time-out per department and facility policy. RN to remain at bedside with continuous monitoring. Pt resting comfortably. Denies pain/discomfort. Will continue to monitor. See flow sheets for monitoring, medication administration, and updates. patient verbalizes understanding.

## 2025-04-11 NOTE — PLAN OF CARE
CBC from this morning with Hgb of 7.0.  Concern it was inaccurate and redraw was 8.6.  Consent obtained in event Hgb does fall below <7, along with additional workup.      Ascencion Solomon MD  [facility]

## 2025-04-11 NOTE — NURSING
Patient arrived to MPU bay 4 s/p aspiration of presacral fluid collection. Dressing to buttock is clean and dry. Cardiac monitoring continued. Fall precautions reviewed. Bed in lowest, locked position. Call light within reach.

## 2025-04-11 NOTE — HOSPITAL COURSE
Patient underwent sample collection with Interventional Radiology and studies sent.  Difficulty in finding window yielded small fluid collection, yielding more diagnostic than therapeutic results.  Despite patient being without leukocytosis, afebrile, and hemodynamically stable, given that nature of fluid accumulation is unknown, it would be prudent to initiate broad spectrum antibiotics. Vancomycin and Zosyn initiated.  Requiring transfusion of 1u blood given Hgb <7 in setting of menorrhagia and awaiting follow up lab with most recent of 7.3.  Flagyl re-initiaed for Trichomonas infection.  Obtaining CT abd/pelvis for comparison to prior.

## 2025-04-11 NOTE — ASSESSMENT & PLAN NOTE
- vaginal Wet Prep with many WBCs, few bacteria, and few Trichomonas  - will stop Flagyl regimen as broad spectrum, including Vanc/Zosyn to cover

## 2025-04-12 PROBLEM — E44.0 MODERATE PROTEIN-CALORIE MALNUTRITION: Status: ACTIVE | Noted: 2025-04-12

## 2025-04-12 LAB
ABSOLUTE EOSINOPHIL (OHS): 0.11 K/UL
ABSOLUTE MONOCYTE (OHS): 0.38 K/UL (ref 0.3–1)
ABSOLUTE NEUTROPHIL COUNT (OHS): 2.75 K/UL (ref 1.8–7.7)
ALBUMIN SERPL BCP-MCNC: 3.1 G/DL (ref 3.5–5.2)
ALP SERPL-CCNC: 48 UNIT/L (ref 40–150)
ALT SERPL W/O P-5'-P-CCNC: 8 UNIT/L (ref 10–44)
ANION GAP (OHS): 7 MMOL/L (ref 8–16)
AST SERPL-CCNC: 17 UNIT/L (ref 11–45)
BASOPHILS # BLD AUTO: 0.02 K/UL
BASOPHILS NFR BLD AUTO: 0.4 %
BILIRUB SERPL-MCNC: 0.1 MG/DL (ref 0.1–1)
BUN SERPL-MCNC: 13 MG/DL (ref 6–20)
CALCIUM SERPL-MCNC: 8.8 MG/DL (ref 8.7–10.5)
CHLORIDE SERPL-SCNC: 108 MMOL/L (ref 95–110)
CO2 SERPL-SCNC: 25 MMOL/L (ref 23–29)
CREAT SERPL-MCNC: 0.8 MG/DL (ref 0.5–1.4)
ERYTHROCYTE [DISTWIDTH] IN BLOOD BY AUTOMATED COUNT: 17.6 % (ref 11.5–14.5)
GFR SERPLBLD CREATININE-BSD FMLA CKD-EPI: >60 ML/MIN/1.73/M2
GLUCOSE SERPL-MCNC: 95 MG/DL (ref 70–110)
GRAM STN SPEC: NORMAL
HCT VFR BLD AUTO: 25.5 % (ref 37–48.5)
HGB BLD-MCNC: 7.2 GM/DL (ref 12–16)
IMM GRANULOCYTES # BLD AUTO: 0.01 K/UL (ref 0–0.04)
IMM GRANULOCYTES NFR BLD AUTO: 0.2 % (ref 0–0.5)
LYMPHOCYTES # BLD AUTO: 1.66 K/UL (ref 1–4.8)
MCH RBC QN AUTO: 20.9 PG (ref 27–31)
MCHC RBC AUTO-ENTMCNC: 28.2 G/DL (ref 32–36)
MCV RBC AUTO: 74 FL (ref 82–98)
NUCLEATED RBC (/100WBC) (OHS): 0 /100 WBC
PLATELET # BLD AUTO: 367 K/UL (ref 150–450)
PMV BLD AUTO: 11 FL (ref 9.2–12.9)
POTASSIUM SERPL-SCNC: 3.4 MMOL/L (ref 3.5–5.1)
PROT SERPL-MCNC: 6.2 GM/DL (ref 6–8.4)
RBC # BLD AUTO: 3.45 M/UL (ref 4–5.4)
RELATIVE EOSINOPHIL (OHS): 2.2 %
RELATIVE LYMPHOCYTE (OHS): 33.7 % (ref 18–48)
RELATIVE MONOCYTE (OHS): 7.7 % (ref 4–15)
RELATIVE NEUTROPHIL (OHS): 55.8 % (ref 38–73)
SODIUM SERPL-SCNC: 140 MMOL/L (ref 136–145)
WBC # BLD AUTO: 4.93 K/UL (ref 3.9–12.7)

## 2025-04-12 PROCEDURE — 11000001 HC ACUTE MED/SURG PRIVATE ROOM

## 2025-04-12 PROCEDURE — 63600175 PHARM REV CODE 636 W HCPCS: Performed by: STUDENT IN AN ORGANIZED HEALTH CARE EDUCATION/TRAINING PROGRAM

## 2025-04-12 PROCEDURE — 36415 COLL VENOUS BLD VENIPUNCTURE: CPT | Performed by: STUDENT IN AN ORGANIZED HEALTH CARE EDUCATION/TRAINING PROGRAM

## 2025-04-12 PROCEDURE — 80053 COMPREHEN METABOLIC PANEL: CPT | Performed by: STUDENT IN AN ORGANIZED HEALTH CARE EDUCATION/TRAINING PROGRAM

## 2025-04-12 PROCEDURE — 25000003 PHARM REV CODE 250: Performed by: STUDENT IN AN ORGANIZED HEALTH CARE EDUCATION/TRAINING PROGRAM

## 2025-04-12 PROCEDURE — 85025 COMPLETE CBC W/AUTO DIFF WBC: CPT | Performed by: STUDENT IN AN ORGANIZED HEALTH CARE EDUCATION/TRAINING PROGRAM

## 2025-04-12 RX ORDER — DIPHENHYDRAMINE HCL 25 MG
25 CAPSULE ORAL ONCE
Status: COMPLETED | OUTPATIENT
Start: 2025-04-12 | End: 2025-04-12

## 2025-04-12 RX ORDER — OXYCODONE AND ACETAMINOPHEN 7.5; 325 MG/1; MG/1
1 TABLET ORAL EVERY 4 HOURS PRN
Refills: 0 | Status: DISCONTINUED | OUTPATIENT
Start: 2025-04-12 | End: 2025-04-15 | Stop reason: HOSPADM

## 2025-04-12 RX ADMIN — HYDROCODONE BITARTRATE AND ACETAMINOPHEN 1 TABLET: 5; 325 TABLET ORAL at 12:04

## 2025-04-12 RX ADMIN — PIPERACILLIN SODIUM AND TAZOBACTAM SODIUM 4.5 G: 4; .5 INJECTION, POWDER, FOR SOLUTION INTRAVENOUS at 04:04

## 2025-04-12 RX ADMIN — Medication 324 MG: at 07:04

## 2025-04-12 RX ADMIN — ONDANSETRON 4 MG: 2 INJECTION INTRAMUSCULAR; INTRAVENOUS at 12:04

## 2025-04-12 RX ADMIN — OXYCODONE HYDROCHLORIDE AND ACETAMINOPHEN 1 TABLET: 7.5; 325 TABLET ORAL at 09:04

## 2025-04-12 RX ADMIN — DOCUSATE SODIUM 50 MG: 50 CAPSULE, LIQUID FILLED ORAL at 09:04

## 2025-04-12 RX ADMIN — DIPHENHYDRAMINE HYDROCHLORIDE 25 MG: 25 CAPSULE ORAL at 11:04

## 2025-04-12 RX ADMIN — VANCOMYCIN HYDROCHLORIDE 750 MG: 750 INJECTION, POWDER, LYOPHILIZED, FOR SOLUTION INTRAVENOUS at 09:04

## 2025-04-12 NOTE — ASSESSMENT & PLAN NOTE
Anemia is likely due to unknown cause. Most recent hemoglobin and hematocrit are listed below.  - underwent EGD an C-scope (unremarkable with exception of acute and chronic gastritis) but questionable H. Pylori findings  - patient states imaging was performed for constipation  - repeat H. Pylori for reassessment  - awaiting repeat CBC to ensure below lab accurate; type and screen and getting consent for transfusion in event it is accurate; repeat was 8.6 consideration of serial CBC if appropriate  - found to be markedly iron deficient and initiating replacement regimen  - additional fall in Hgb and patient states she has menorrhagia and dysmenorrhea which she is experiencing now; low suspicion for GI bleed at this time but careful observation given abdominal pain    Recent Labs   Lab 04/12/25  0308   WBC 4.93   RBC 3.45*   HGB 7.2*   HCT 25.5*      MCV 74*   MCH 20.9*   MCHC 28.2*       Plan  - Monitor serial CBC: Daily  - Transfuse PRBC if patient becomes hemodynamically unstable, symptomatic or H/H drops below 7/21.

## 2025-04-12 NOTE — CONSULTS
[facility] - Med Surg  Adult Nutrition  Consult Note    SUMMARY     Recommendations    1.) Recommend continuing with Regular Diet, as tolerated.       - RN staff: please continue to document PO intake in the flowsheets     2.) If PO intake < 50%, recommend Boost Plus BID to help meet needs.     3.) RD to monitor wt, PO intake, skin, labs.      Goals: To meet % of EEN/EPN by next RD f/u   Nutrition Goal Status: new  Communication of RD Recs:  (POC)    Nutrition Discharge Planning     Nutrition Discharge Planning: General healthy diet    Assessment and Plan    Endocrine  Moderate protein-calorie malnutrition  Malnutrition Type:  Context: social/environmental circumstances  Level: moderate    Related to (etiology):   Decreased appetite/increased stress    Signs and Symptoms (as evidenced by):   Mild to moderate muscle/fat wasting and significant wt loss: -7% x 1 week    Malnutrition Characteristic Summary:  Weight Loss (Malnutrition):  (6.9% weight loss x 1 week)  Subcutaneous Fat (Malnutrition): mild depletion  Muscle Mass (Malnutrition): mild depletion      Interventions/Recommendations (treatment strategy):  Collaboration of nutritional care with other providers.      Nutrition Diagnosis Status:   New    Malnutrition Assessment  Malnutrition Context: social/environmental circumstances  Malnutrition Level: moderate  Skin (Micronutrient): none  Nails (Micronutrient): none  Hair/Scalp (Micronutrient): none  Eyes (Micronutrient): none  Extraoral (Micronutrient): none  Gums (Micronutrient): none  Lips/Mucous Membranes (Micronutrient): none  Teeth (Micronutrient): none  Tongue (Micronutrient): none  Neck/Chest (Micronutrient): bony prominence, muscle wasting   Micronutrient Evaluation Summary: no deficiencies   Weight Loss (Malnutrition):  (6.9% weight loss x 1 week)  Subcutaneous Fat (Malnutrition): mild depletion  Muscle Mass (Malnutrition): mild depletion   Orbital Region (Subcutaneous Fat Loss): well  "nourished  Upper Arm Region (Subcutaneous Fat Loss): mild depletion   [place] Region (Muscle Loss): mild depletion  Clavicle Bone Region (Muscle Loss): moderate depletion  Clavicle and Acromion Bone Region (Muscle Loss): mild depletion  Dorsal Hand (Muscle Loss): well nourished  Patellar Region (Muscle Loss): mild depletion  Anterior Thigh Region (Muscle Loss): mild depletion  Posterior Calf Region (Muscle Loss): well nourished     Reason for Assessment    Reason For Assessment: consult  Diagnosis: other (see comments) (Intraabdominal fluid collection)    General Information Comments: RD consulted for malnutrition. PMHx: Bartholin cysts, microcytic anemia, Postural Proteinuria. Pt presented to [facility] with increasing abdominal pain. S/p vaginal abscess drained on Thursday. Pt states not really a breakfast person; 100% PO intake of all other meals; nausea yesterday but fixed with meds; BW as high as 116 lb and as low as 106 lb - pt believes weight fluctuates d/t stress. Per chart review, noted significant wt loss at the one week marker: -7%. NFPE performed: RD feels pt meets the criteria for moderate malnutrition in the context of environmental/social. Please see PES and malnutrition findings.     Nutrition Related Social Determinants of Health: SDOH: Adequate food in home environment     Food Insecurity: No Food Insecurity (4/11/2025)    Hunger Vital Sign     Worried About Running Out of Food in the Last Year: Never true     Ran Out of Food in the Last Year: Never true      Nutrition/Diet History    Spiritual, Cultural Beliefs, Catholic Practices, Values that Affect Care: no  Food Allergies: NKFA  Factors Affecting Nutritional Intake: decreased appetite, other (see comments) (stress)    Anthropometrics    Height: 5' 7" (170.2 cm)  Height (inches): 67 in  Height Method: Stated  Weight: 49.3 kg (108 lb 11 oz)  Weight (lb): 108.69 lb  Weight Method: Bed Scale  Ideal Body Weight (IBW), Female: 135 lb  % Ideal Body Weight, " Female (lb): 80.51 %  BMI (Calculated): 17       Lab/Procedures/Meds    Pertinent Labs Reviewed: reviewed  Pertinent Labs Comments: K: 3.4  Pertinent Medications Reviewed: reviewed  Pertinent Medications Comments: Docusate sodium, Fe Gluconate, abx    Estimated/Assessed Needs    Weight Used For Calorie Calculations: 49.3 kg (108 lb 11 oz)  Energy Calorie Requirements (kcal): 1479- 1726 kcal  Energy Need Method: Kcal/kg (30-35 kcal/kg for wt gain)  Protein Requirements: 59g (1.2g/kg)  Weight Used For Protein Calculations: 49.3 kg (108 lb 11 oz)  Fluid Requirements (mL): as per MD or RDA  Estimated Fluid Requirement Method: RDA Method  RDA Method (mL): 1479    Nutrition Prescription Ordered    Current Diet Order: Regular Diet    Evaluation of Received Nutrient/Fluid Intake    I/O: +2.6 L since admit  Energy Calories Required: meeting needs  Protein Required: meeting needs  Fluid Required:  (as per MD)  Comments: LBM 4/10  Tolerance: tolerating  % Intake of Estimated Energy Needs: 75 - 100 %  % Meal Intake: 75 - 100 %    Nutrition Risk    Level of Risk/Frequency of Follow-up: low     Monitor and Evaluation    Monitor and Evaluation: Energy intake, Diet order, Weight, Electrolyte and renal panel, Gastrointestinal profile, Glucose/endocrine profile, Inflammatory profile, Lipid profile, Skin     Nutrition Follow-Up    RD Follow-up?: Yes

## 2025-04-12 NOTE — ASSESSMENT & PLAN NOTE
- patient with recurrent labial abscess presenting with marked abdominal pain for 3 days with recent drainage of labial abscess  - important findings on CT Abd/Pelvis: 3.4 x 2.4 cm left labial fluid and air containing collection likely with a drain partially extending into the collection; 2.6 x 1.5 cm air and fluid collection in the right lower quadrant, and additional air and fluid extending along the right anterior aspect of the sacrum  - Gen Surg deem no surgical intervention, Gyn drained labial abscess  - consult to Interventional Radiology for drainage and sampling  of site  - patient was notably recently on antibiotics (Doxycycline and received Rocephin for labial abscess, as well as keflex for GBS UTI); she remains on Doxy and Keflex to complete course  - given that patient is hemodynamically stable, without leukocytosis, and afebrile, in addition to desire to optimize potential fluid collection culture, initially had regimen of Doxycycline, Flagyl (for trichomonas) and held IV antibiotics so as not to confound study samples of fluids; now s/p sample collection with Interventional Radiology, it would be prudent to initiate broad spectrum antibiotics: Vancomycin and Zosyn  - pain control and PRN anti-emetics  - will follow up fluid studies and proceed as appropriate

## 2025-04-12 NOTE — PROGRESS NOTES
[facility] Medicine  Progress Note    Patient Name: Alessandro Moeller  MRN: [id number]  Patient Class: IP- Inpatient   Admission Date: 4/10/2025  Length of Stay: 2 days  Attending Physician: Ascencion Solomon MD  Primary Care Provider: Ankita, Primary Doctor        Subjective     Principal Problem:Intraabdominal fluid collection        HPI:  19F with past medical history of recurrent Bartholin cysts, microcytic anemia, Postural Proteinuria presenting with a 3 day history of abdominal pain.  She underwent drainage for recurrent Bartholin gland abscess on 4/3/2025, provided with Rocephin and Doxycycline, and was started on Keflex for Group b Strep urinary tract infection at same time.  She presents with a 3 day history of poorly localized abdominal pain, that is occasionally dull and occasionally sharp in nature,  is accompanied by subjective fever, chills, an episode of emesis.  She denies continued dysuria after recent UTI. She denies diarrhea, trauma to region, and sick contacts with similar symptoms. Ct abdomen Pelvis obtained in ED and findings include labial fluid and air collection, right lower quadrant fluid and air collection, and pre-sacral complex fluid and air collection.    In ED, patient is afebrile, without leukocytosis, and hemodynamically stable. General surgery was consulted and deem no surgical intervention.  Ob/Gyn consulted and performed drainage of labial abscess and recommend MRI Pelvis to assess for occult tubo-ovarian abscess.    She was given a liter of IVF, provided with morphine for pain control, given Flagyl (vaginal wet prep positive for trichomonas as well as bacteria), and will be admitted to Hospital Medicine for further assessment, treatment, as well as consideration of Interventional Radiology for drainage and sampling of fluid collection.    Overview/Hospital Course:  Patient underwent sample collection with Interventional Radiology and studies sent.  Difficulty in finding  window yielded small fluid collection, yielding more diagnostic than therapeutic results.  Despite patient being without leukocytosis, afebrile, and hemodynamically stable, given that nature of fluid accumulation is unknown, it would be prudent to initiate broad spectrum antibiotics. Vancomycin and Zosyn initiated.    Interval History: Patient afebrile and endorses some improvement in abdominal pain that brought her in but states that she typically has menorrhagia and dysmenorrhea which she is experiencing now, likely leading to falls in hemoglobin.      Review of Systems   Constitutional:  Negative for chills and fever.   HENT:  Negative for trouble swallowing.    Respiratory:  Negative for cough and shortness of breath.    Cardiovascular:  Negative for chest pain and palpitations.   Gastrointestinal:  Positive for abdominal pain. Negative for constipation, diarrhea and nausea.   Genitourinary:  Positive for vaginal bleeding.   Musculoskeletal:  Negative for arthralgias and back pain.   Neurological:  Negative for light-headedness and headaches.   Psychiatric/Behavioral:  Negative for agitation and behavioral problems.      Objective:     Vital Signs (Most Recent):  Temp: 98.8 °F (37.1 °C) (04/12/25 0724)  Pulse: 75 (04/12/25 0724)  Resp: 16 (04/12/25 0724)  BP: (!) 119/55 (04/12/25 0724)  SpO2: 99 % (04/12/25 0724) Vital Signs (24h Range):  Temp:  [97.5 °F (36.4 °C)-98.8 °F (37.1 °C)] 98.8 °F (37.1 °C)  Pulse:  [62-89] 75  Resp:  [6-23] 16  SpO2:  [97 %-100 %] 99 %  BP: (108-131)/(53-75) 119/55     Weight: 49.3 kg (108 lb 11 oz)  Body mass index is 17.02 kg/m².    Intake/Output Summary (Last 24 hours) at 4/12/2025 0821  Last data filed at 4/12/2025 0634  Gross per 24 hour   Intake 980 ml   Output 0.1 ml   Net 979.9 ml         Physical Exam  Constitutional:       General: She is not in acute distress.     Appearance: She is not toxic-appearing.   HENT:      Head: Normocephalic and atraumatic.   Eyes:       Extraocular Movements: Extraocular movements intact.   Cardiovascular:      Rate and Rhythm: Normal rate and regular rhythm.   Pulmonary:      Effort: Pulmonary effort is normal. No respiratory distress.   Abdominal:      General: Bowel sounds are normal.      Tenderness: There is abdominal tenderness (Mild TTP decreased since yesterday).   Musculoskeletal:      Right lower leg: No edema.      Left lower leg: No edema.   Neurological:      General: No focal deficit present.      Mental Status: She is alert and oriented to person, place, and time.   Psychiatric:      Comments: Flat affect               Significant Labs: All pertinent labs within the past 24 hours have been reviewed.  CBC:   Recent Labs   Lab 04/11/25  0510 04/11/25  1456 04/12/25  0308   WBC 5.40 5.35 4.93   HGB 7.0* 8.6* 7.2*   HCT 24.7* 30.6* 25.5*    417 367     CMP:   Recent Labs   Lab 04/10/25  0912 04/11/25  0510 04/12/25  0308    139 140   K 4.0 3.6 3.4*    109 108   CO2 22* 24 25   BUN 12 12 13   CREATININE 0.7 0.7 0.8   CALCIUM 10.2 8.8 8.8   ALBUMIN 4.4 3.0* 3.1*   BILITOT 0.2 0.1 0.1   ALKPHOS 49 38* 48   AST 19 14 17   ALT 10 8* 8*   ANIONGAP 8 6* 7*       Significant Imaging: I have reviewed all pertinent imaging results/findings within the past 24 hours.      Assessment & Plan  Intraabdominal fluid collection  - patient with recurrent labial abscess presenting with marked abdominal pain for 3 days with recent drainage of labial abscess  - important findings on CT Abd/Pelvis: 3.4 x 2.4 cm left labial fluid and air containing collection likely with a drain partially extending into the collection; 2.6 x 1.5 cm air and fluid collection in the right lower quadrant, and additional air and fluid extending along the right anterior aspect of the sacrum  - Gen Surg deem no surgical intervention, Gyn drained labial abscess  - consult to Interventional Radiology for drainage and sampling  of site  - patient was notably recently  on antibiotics (Doxycycline and received Rocephin for labial abscess, as well as keflex for GBS UTI); she remains on Doxy and Keflex to complete course  - given that patient is hemodynamically stable, without leukocytosis, and afebrile, in addition to desire to optimize potential fluid collection culture, initially had regimen of Doxycycline, Flagyl (for trichomonas) and held IV antibiotics so as not to confound study samples of fluids; now s/p sample collection with Interventional Radiology, it would be prudent to initiate broad spectrum antibiotics: Vancomycin and Zosyn  - pain control and PRN anti-emetics  - will follow up fluid studies and proceed as appropriate    Abdominal pain  - see intra-abdominal fluid collection as above    Labial abscess  - recurrent abscesses, with  most recent drained one week ago and intitiation of antibiotics  - fluid collection as above  - Ob/Gyn drained current abscess  - transitioned from Doxycycline to broader spectrum    Trichomonal vaginitis  - vaginal Wet Prep with many WBCs, few bacteria, and few Trichomonas  - will stop Flagyl regimen as broad spectrum, including Vanc/Zosyn to cover    Microcytic anemia  Anemia is likely due to unknown cause. Most recent hemoglobin and hematocrit are listed below.  - underwent EGD an C-scope (unremarkable with exception of acute and chronic gastritis) but questionable H. Pylori findings  - patient states imaging was performed for constipation  - repeat H. Pylori for reassessment  - awaiting repeat CBC to ensure below lab accurate; type and screen and getting consent for transfusion in event it is accurate; repeat was 8.6 consideration of serial CBC if appropriate  - found to be markedly iron deficient and initiating replacement regimen  - additional fall in Hgb and patient states she has menorrhagia and dysmenorrhea which she is experiencing now; low suspicion for GI bleed at this time but careful observation given abdominal pain    Recent  Labs   Lab 04/12/25  0308   WBC 4.93   RBC 3.45*   HGB 7.2*   HCT 25.5*      MCV 74*   MCH 20.9*   MCHC 28.2*       Plan  - Monitor serial CBC: Daily  - Transfuse PRBC if patient becomes hemodynamically unstable, symptomatic or H/H drops below 7/21.    BMI < 18.5      VTE Risk Mitigation (From admission, onward)           Ordered     Reason for No Pharmacological VTE Prophylaxis  Once        Comments: Procedure   Question:  Reasons:  Answer:  Physician Provided (leave comment)    04/10/25 1747     Place sequential compression device  Until discontinued         04/10/25 1747                    Discharge Planning   ZENAIDA:  4/15/2025    Code Status: Full Code   Medical Readiness for Discharge Date:                Ascencion Solomon MD  Department of Hospital Medicine   [facility]

## 2025-04-12 NOTE — ASSESSMENT & PLAN NOTE
- recurrent abscesses, with  most recent drained one week ago and intitiation of antibiotics  - fluid collection as above  - Ob/Gyn drained current abscess  - transitioned from Doxycycline to broader spectrum

## 2025-04-12 NOTE — SUBJECTIVE & OBJECTIVE
Interval History: Patient afebrile and endorses some improvement in abdominal pain that brought her in but states that she typically has menorrhagia and dysmenorrhea which she is experiencing now, likely leading to falls in hemoglobin.      Review of Systems   Constitutional:  Negative for chills and fever.   HENT:  Negative for trouble swallowing.    Respiratory:  Negative for cough and shortness of breath.    Cardiovascular:  Negative for chest pain and palpitations.   Gastrointestinal:  Positive for abdominal pain. Negative for constipation, diarrhea and nausea.   Genitourinary:  Positive for vaginal bleeding.   Musculoskeletal:  Negative for arthralgias and back pain.   Neurological:  Negative for light-headedness and headaches.   Psychiatric/Behavioral:  Negative for agitation and behavioral problems.      Objective:     Vital Signs (Most Recent):  Temp: 98.8 °F (37.1 °C) (04/12/25 0724)  Pulse: 75 (04/12/25 0724)  Resp: 16 (04/12/25 0724)  BP: (!) 119/55 (04/12/25 0724)  SpO2: 99 % (04/12/25 0724) Vital Signs (24h Range):  Temp:  [97.5 °F (36.4 °C)-98.8 °F (37.1 °C)] 98.8 °F (37.1 °C)  Pulse:  [62-89] 75  Resp:  [6-23] 16  SpO2:  [97 %-100 %] 99 %  BP: (108-131)/(53-75) 119/55     Weight: 49.3 kg (108 lb 11 oz)  Body mass index is 17.02 kg/m².    Intake/Output Summary (Last 24 hours) at 4/12/2025 0821  Last data filed at 4/12/2025 0634  Gross per 24 hour   Intake 980 ml   Output 0.1 ml   Net 979.9 ml         Physical Exam  Constitutional:       General: She is not in acute distress.     Appearance: She is not toxic-appearing.   HENT:      Head: Normocephalic and atraumatic.   Eyes:      Extraocular Movements: Extraocular movements intact.   Cardiovascular:      Rate and Rhythm: Normal rate and regular rhythm.   Pulmonary:      Effort: Pulmonary effort is normal. No respiratory distress.   Abdominal:      General: Bowel sounds are normal.      Tenderness: There is abdominal tenderness (Mild TTP decreased since  yesterday).   Musculoskeletal:      Right lower leg: No edema.      Left lower leg: No edema.   Neurological:      General: No focal deficit present.      Mental Status: She is alert and oriented to person, place, and time.   Psychiatric:      Comments: Flat affect               Significant Labs: All pertinent labs within the past 24 hours have been reviewed.  CBC:   Recent Labs   Lab 04/11/25  0510 04/11/25  1456 04/12/25  0308   WBC 5.40 5.35 4.93   HGB 7.0* 8.6* 7.2*   HCT 24.7* 30.6* 25.5*    417 367     CMP:   Recent Labs   Lab 04/10/25  0912 04/11/25  0510 04/12/25  0308    139 140   K 4.0 3.6 3.4*    109 108   CO2 22* 24 25   BUN 12 12 13   CREATININE 0.7 0.7 0.8   CALCIUM 10.2 8.8 8.8   ALBUMIN 4.4 3.0* 3.1*   BILITOT 0.2 0.1 0.1   ALKPHOS 49 38* 48   AST 19 14 17   ALT 10 8* 8*   ANIONGAP 8 6* 7*       Significant Imaging: I have reviewed all pertinent imaging results/findings within the past 24 hours.

## 2025-04-12 NOTE — ASSESSMENT & PLAN NOTE
Malnutrition Type:  Context: social/environmental circumstances  Level: moderate    Related to (etiology):   Decreased appetite/increased stress    Signs and Symptoms (as evidenced by):   Mild to moderate muscle/fat wasting and significant wt loss: -7% x 1 week    Malnutrition Characteristic Summary:  Weight Loss (Malnutrition):  (6.9% weight loss x 1 week)  Subcutaneous Fat (Malnutrition): mild depletion  Muscle Mass (Malnutrition): mild depletion      Interventions/Recommendations (treatment strategy):  Collaboration of nutritional care with other providers.      Nutrition Diagnosis Status:   New

## 2025-04-12 NOTE — PLAN OF CARE
[facility] - Med Surg  Initial Discharge Assessment       Primary Care Provider: No, Primary Doctor    Admission Diagnosis: Bartholin's gland abscess [N75.1]  PID (acute pelvic inflammatory disease) [N73.0]  Intra-abdominal abscess [K65.1]  Intra-abdominal fluid collection [R18.8]  Trichomoniasis [A59.9]  Chest pain [R07.9]  Pelvic abscess in female [N73.9]  Intraabdominal fluid collection [R18.8]    Admission Date: 4/10/2025  Expected Discharge Date: 4/15/2025    Transition of Care Barriers: None    Payor: [employer] / Plan: [employer] CHOICE / Product Type: Commercial /     Extended Emergency Contact Information  Primary Emergency Contact: Dunia,Jess  Address: [address] [place] [address] [facility]  Home Phone: [phone]  Mobile Phone: [phone]  Relation: Mother    Discharge Plan A: Home  Discharge Plan B: Home with family      [id number] [facility] [address]  [place] [address] [address]  [facility] [address]  Phone: [phone] Fax: [phone]      Initial Assessment (most recent)       Adult Discharge Assessment - 04/12/25 1257          Discharge Assessment    Assessment Type Discharge Planning Assessment     Confirmed/corrected address, phone number and insurance Yes     Confirmed Demographics Correct on Facesheet     Source of Information patient     Reason For Admission Intraabdominal fluid collection     People in Home parent(s)     Facility Arrived From: home     Do you expect to return to your current living situation? Yes     Do you have help at home or someone to help you manage your care at home? Yes     Who are your caregiver(s) and their phone number(s)? Mother Jess [phone]     Prior to hospitilization cognitive status: Alert/Oriented     Current cognitive status: Alert/Oriented     Walking or Climbing Stairs Difficulty no     Dressing/Bathing Difficulty no      Home Accessibility wheelchair accessible     Home Layout Able to live on 1st floor     Equipment Currently Used at Home none     Readmission within 30 days? No     Patient currently being followed by outpatient case management? No     Do you currently have service(s) that help you manage your care at home? No     Do you take prescription medications? No     Do you have prescription coverage? Yes     Coverage [facility]     Do you have any problems affording any of your prescribed medications? TBD     Who is going to help you get home at discharge? Mother     How do you get to doctors appointments? car, drives self;family or friend will provide;public transportation     Are you on dialysis? No     Do you take coumadin? No     Discharge Plan A Home     Discharge Plan B Home with family     DME Needed Upon Discharge  none     Discharge Plan discussed with: Patient     Transition of Care Barriers None        Physical Activity    On average, how many days per week do you engage in moderate to strenuous exercise (like a brisk walk)? 6 days     On average, how many minutes do you engage in exercise at this level? 40 min        Financial Resource Strain    How hard is it for you to pay for the very basics like food, housing, medical care, and heating? Not very hard        Housing Stability    In the last 12 months, was there a time when you were not able to pay the mortgage or rent on time? No     At any time in the past 12 months, were you homeless or living in a shelter (including now)? No        Transportation Needs    In the past 12 months, has lack of transportation kept you from medical appointments or from getting medications? No     In the past 12 months, has lack of transportation kept you from meetings, work, or from getting things needed for daily living? No        Food Insecurity    Within the past 12 months, you worried that your food would run out before you got the money to buy more. Never true     Within the  past 12 months, the food you bought just didn't last and you didn't have money to get more. Never true        Stress    Do you feel stress - tense, restless, nervous, or anxious, or unable to sleep at night because your mind is troubled all the time - these days? Only a little        Social Isolation    How often do you feel lonely or isolated from those around you?  Never        Alcohol Use    Q1: How often do you have a drink containing alcohol? Monthly or less     Q2: How many drinks containing alcohol do you have on a typical day when you are drinking? 1 or 2        Utilities    In the past 12 months has the electric, gas, oil, or water company threatened to shut off services in your home? No        Health Literacy    How often do you need to have someone help you when you read instructions, pamphlets, or other written material from your doctor or pharmacy? Never                        Discharge Plan A and Plan B have been determined by review of patient's clinical status, future medical and therapeutic needs, and coverage/benefits for post-acute care in coordination with multidisciplinary team members.     CM spoke with patient in Room at bedside. All information was verified on facesheet. Patient lives in a 1 story house with Mother Jess [phone], 1 step to get inside with no pets.   Patient states no assistance is needed. Patient can ambulate, drive, run errands, and complete ADL's independently. Patient does not use any DME or any in-home assistive equipment. Patient has not used Home Health previously.   Patient is not on dialysis nor use Coumadin as a blood thinner. Patient takes medication as prescribed and has resources for all prescriptive needs.   Patient will have help from family member upon discharge. Family will provide transportation home.   Discharge booklet given to patient at bedside. All Questions and concerns addressed and whiteboard updated with CM contact information. Will continue  to follow for course of hospitalization.   Mali Vail RN  Case Management  [phone]

## 2025-04-12 NOTE — PROGRESS NOTES
"Pharmacokinetic Initial Assessment: IV Vancomycin    Assessment/Plan:    Initiate intravenous vancomycin with loading dose of 1000 mg once followed by a maintenance dose of vancomycin 750mg IV every 12 hours  Desired empiric serum trough concentration is 10 to 20 mcg/mL  Draw vancomycin trough level 30 min prior to fourth dose on 4/13 at approximately 0900  Pharmacy will continue to follow and monitor vancomycin.      Please contact pharmacy at extension [phone] with any questions regarding this assessment.     Thank you for the consult,   Tommy Cota       Patient brief summary:  Alessandro Moeller is a 19 y.o. female initiated on antimicrobial therapy with IV Vancomycin for treatment of suspected intra-abdominal infection    Drug Allergies:   Review of patient's allergies indicates:  No Known Allergies    Actual Body Weight:   49 kg    Renal Function:   Estimated Creatinine Clearance: 98.9 mL/min (based on SCr of 0.7 mg/dL).,     Dialysis Method (if applicable):  N/A    CBC (last 72 hours):  Recent Labs   Lab Result Units 04/10/25  0912 04/11/25  0510 04/11/25  1456   WBC K/uL 6.84 5.40 5.35   HGB gm/dL 9.6* 7.0* 8.6*   HCT % 34.1* 24.7* 30.6*   Platelet Count K/uL 521* 333 417   Lymph % % 23.0 24.6 29.7   Mono % % 6.4 9.1 3.0*   Eos % % 0.1 0.0 0.2   Basophil % % 0.6 0.4 0.4       Metabolic Panel (last 72 hours):  Recent Labs   Lab Result Units 04/10/25  0836 04/10/25  0912 04/11/25  0510   Sodium mmol/L  --  137 139   Potassium mmol/L  --  4.0 3.6   Chloride mmol/L  --  107 109   CO2 mmol/L  --  22* 24   Glucose mg/dL  --  85 98   Glucose, UA  Negative  --   --    BUN mg/dL  --  12 12   Creatinine mg/dL  --  0.7 0.7   Albumin g/dL  --  4.4 3.0*   Bilirubin Total mg/dL  --  0.2 0.1   ALP unit/L  --  49 38*   AST unit/L  --  19 14   ALT unit/L  --  10 8*       Drug levels (last 3 results):  No results for input(s): "VANCOMYCINRA", "VANCORANDOM", "VANCOMYCINPE", "VANCOPEAK", "VANCOMYCINTR", "VANCOTROUGH" in the last 72 " hours.    Microbiologic Results:  Microbiology Results (last 7 days)       Procedure Component Value Units Date/Time    Urine culture [[id number]] Collected: 04/10/25 0836    Order Status: Completed Specimen: Urine, Clean Catch Updated: 04/11/25 2054     Urine Culture No Growth    Aerobic culture [[id number]] Collected: 04/11/25 1212    Order Status: Sent Specimen: Body Fluid from Pancreatic Fluid Updated: 04/11/25 1515    Gram stain [[id number]] Collected: 04/11/25 1212    Order Status: Sent Specimen: Body Fluid from Pancreatic Fluid Updated: 04/11/25 1514    Fungus culture [[id number]] Collected: 04/11/25 1212    Order Status: Sent Specimen: Body Fluid from Pancreatic Fluid Updated: 04/11/25 1514    Culture, Anaerobic [[id number]] Collected: 04/11/25 1212    Order Status: Sent Specimen: Body Fluid from Pancreatic Fluid Updated: 04/11/25 1514    AFB Culture & Smear [[id number]]     Order Status: Sent Specimen: Abscess from Abdomen     Culture, Anaerobe [[id number]]     Order Status: Sent Specimen: Body Fluid from Abdomen     Aerobic culture [[id number]]     Order Status: Sent Specimen: Abscess from Abdomen     Gram stain [[id number]]     Order Status: Sent Specimen: Body Fluid from Abdomen     Culture, Anaerobic [[id number]] Collected: 04/10/25 0957    Order Status: Completed Specimen: Abscess from Vagina Updated: 04/11/25 0752     Anaerobe Culture Culture In Progress    Aerobic culture (Specify Source) **CANNOT BE ORDERED AS STAT* [[id number]] Collected: 04/10/25 0957    Order Status: Completed Specimen: Abscess from Vagina Updated: 04/11/25 0752     CULTURE, AEROBIC No Growth    Influenza A & B by Molecular [[id number]]  (Normal) Collected: 04/10/25 0903    Order Status: Completed Specimen: Nasal Swab Updated: 04/10/25 0957     INFLUENZA A MOLECULAR Negative     INFLUENZA B MOLECULAR  Negative    Wet Prep, Genital (Vaginal Screen) [[id number]]  (Abnormal) Collected: 04/10/25 0903    Order Status: Completed  Specimen: Genital from Cervicovaginal Updated: 04/10/25 0953     WBC Many     Bacteria Few     Clue Cells None     TRICHOMONAS  Few     BUDDING YEAST None     FUNGAL HYPHAE None    Aerobic culture (Specify Source) **CANNOT BE ORDERED AS STAT* [[id number]]     Order Status: Canceled Specimen: Abscess from Vagina

## 2025-04-12 NOTE — PLAN OF CARE
Recommendations     1.) Recommend continuing with Regular Diet, as tolerated.                             - RN staff: please continue to document PO intake in the flowsheets      2.) If PO intake < 50%, recommend Boost Plus BID to help meet needs.      3.) RD to monitor wt, PO intake, skin, labs.       Goals: To meet % of EEN/EPN by next RD f/u   Nutrition Goal Status: new  Communication of RD Recs:  (POC)

## 2025-04-13 LAB
ABO + RH BLD: NORMAL
ABORH RETYPE: NORMAL
ABSOLUTE EOSINOPHIL (OHS): 0 K/UL
ABSOLUTE EOSINOPHIL (OHS): 0.01 K/UL
ABSOLUTE MONOCYTE (OHS): 0.27 K/UL (ref 0.3–1)
ABSOLUTE MONOCYTE (OHS): 0.4 K/UL (ref 0.3–1)
ABSOLUTE NEUTROPHIL COUNT (OHS): 2.8 K/UL (ref 1.8–7.7)
ABSOLUTE NEUTROPHIL COUNT (OHS): 2.9 K/UL (ref 1.8–7.7)
ALBUMIN SERPL BCP-MCNC: 3 G/DL (ref 3.5–5.2)
ALP SERPL-CCNC: 35 UNIT/L (ref 40–150)
ALT SERPL W/O P-5'-P-CCNC: 8 UNIT/L (ref 10–44)
ANION GAP (OHS): 4 MMOL/L (ref 8–16)
AST SERPL-CCNC: 15 UNIT/L (ref 11–45)
BASOPHILS # BLD AUTO: 0.01 K/UL
BASOPHILS # BLD AUTO: 0.01 K/UL
BASOPHILS NFR BLD AUTO: 0.2 %
BASOPHILS NFR BLD AUTO: 0.2 %
BILIRUB SERPL-MCNC: 0.1 MG/DL (ref 0.1–1)
BLD PROD TYP BPU: NORMAL
BLOOD UNIT EXPIRATION DATE: NORMAL
BLOOD UNIT TYPE CODE: 6200
BUN SERPL-MCNC: 7 MG/DL (ref 6–20)
CALCIUM SERPL-MCNC: 8.6 MG/DL (ref 8.7–10.5)
CHLORIDE SERPL-SCNC: 109 MMOL/L (ref 95–110)
CO2 SERPL-SCNC: 26 MMOL/L (ref 23–29)
CREAT SERPL-MCNC: 0.7 MG/DL (ref 0.5–1.4)
CROSSMATCH INTERPRETATION: NORMAL
DISPENSE STATUS: NORMAL
ERYTHROCYTE [DISTWIDTH] IN BLOOD BY AUTOMATED COUNT: 17.5 % (ref 11.5–14.5)
ERYTHROCYTE [DISTWIDTH] IN BLOOD BY AUTOMATED COUNT: 17.6 % (ref 11.5–14.5)
GFR SERPLBLD CREATININE-BSD FMLA CKD-EPI: >60 ML/MIN/1.73/M2
GLUCOSE SERPL-MCNC: 72 MG/DL (ref 70–110)
HCT VFR BLD AUTO: 24.2 % (ref 37–48.5)
HCT VFR BLD AUTO: 25.8 % (ref 37–48.5)
HGB BLD-MCNC: 6.9 GM/DL (ref 12–16)
HGB BLD-MCNC: 7.3 GM/DL (ref 12–16)
IMM GRANULOCYTES # BLD AUTO: 0.01 K/UL (ref 0–0.04)
IMM GRANULOCYTES # BLD AUTO: 0.01 K/UL (ref 0–0.04)
IMM GRANULOCYTES NFR BLD AUTO: 0.2 % (ref 0–0.5)
IMM GRANULOCYTES NFR BLD AUTO: 0.2 % (ref 0–0.5)
INDIRECT COOMBS: NORMAL
LYMPHOCYTES # BLD AUTO: 1.5 K/UL (ref 1–4.8)
LYMPHOCYTES # BLD AUTO: 1.91 K/UL (ref 1–4.8)
MCH RBC QN AUTO: 20.7 PG (ref 27–31)
MCH RBC QN AUTO: 20.9 PG (ref 27–31)
MCHC RBC AUTO-ENTMCNC: 28.3 G/DL (ref 32–36)
MCHC RBC AUTO-ENTMCNC: 28.5 G/DL (ref 32–36)
MCV RBC AUTO: 73 FL (ref 82–98)
MCV RBC AUTO: 73 FL (ref 82–98)
MRSA PCR SCRN (OHS): NOT DETECTED
NUCLEATED RBC (/100WBC) (OHS): 0 /100 WBC
NUCLEATED RBC (/100WBC) (OHS): 0 /100 WBC
PLATELET # BLD AUTO: 328 K/UL (ref 150–450)
PLATELET # BLD AUTO: 379 K/UL (ref 150–450)
PMV BLD AUTO: 10.7 FL (ref 9.2–12.9)
PMV BLD AUTO: 10.8 FL (ref 9.2–12.9)
POTASSIUM SERPL-SCNC: 3.6 MMOL/L (ref 3.5–5.1)
PROT SERPL-MCNC: 5.7 GM/DL (ref 6–8.4)
RBC # BLD AUTO: 3.3 M/UL (ref 4–5.4)
RBC # BLD AUTO: 3.52 M/UL (ref 4–5.4)
RELATIVE EOSINOPHIL (OHS): 0 %
RELATIVE EOSINOPHIL (OHS): 0.2 %
RELATIVE LYMPHOCYTE (OHS): 32.7 % (ref 18–48)
RELATIVE LYMPHOCYTE (OHS): 36.5 % (ref 18–48)
RELATIVE MONOCYTE (OHS): 5.9 % (ref 4–15)
RELATIVE MONOCYTE (OHS): 7.6 % (ref 4–15)
RELATIVE NEUTROPHIL (OHS): 55.3 % (ref 38–73)
RELATIVE NEUTROPHIL (OHS): 61 % (ref 38–73)
RH BLD: NORMAL
SODIUM SERPL-SCNC: 139 MMOL/L (ref 136–145)
SPECIMEN OUTDATE: NORMAL
UNIT NUMBER: NORMAL
VANCOMYCIN TROUGH SERPL-MCNC: 5.2 UG/ML (ref 10–22)
WBC # BLD AUTO: 4.59 K/UL (ref 3.9–12.7)
WBC # BLD AUTO: 5.24 K/UL (ref 3.9–12.7)

## 2025-04-13 PROCEDURE — 63600175 PHARM REV CODE 636 W HCPCS: Performed by: STUDENT IN AN ORGANIZED HEALTH CARE EDUCATION/TRAINING PROGRAM

## 2025-04-13 PROCEDURE — 85025 COMPLETE CBC W/AUTO DIFF WBC: CPT | Performed by: STUDENT IN AN ORGANIZED HEALTH CARE EDUCATION/TRAINING PROGRAM

## 2025-04-13 PROCEDURE — 25000003 PHARM REV CODE 250: Performed by: STUDENT IN AN ORGANIZED HEALTH CARE EDUCATION/TRAINING PROGRAM

## 2025-04-13 PROCEDURE — 11000001 HC ACUTE MED/SURG PRIVATE ROOM

## 2025-04-13 PROCEDURE — P9016 RBC LEUKOCYTES REDUCED: HCPCS | Performed by: STUDENT IN AN ORGANIZED HEALTH CARE EDUCATION/TRAINING PROGRAM

## 2025-04-13 PROCEDURE — 36430 TRANSFUSION BLD/BLD COMPNT: CPT

## 2025-04-13 PROCEDURE — 36415 COLL VENOUS BLD VENIPUNCTURE: CPT | Performed by: STUDENT IN AN ORGANIZED HEALTH CARE EDUCATION/TRAINING PROGRAM

## 2025-04-13 PROCEDURE — 86920 COMPATIBILITY TEST SPIN: CPT | Performed by: STUDENT IN AN ORGANIZED HEALTH CARE EDUCATION/TRAINING PROGRAM

## 2025-04-13 PROCEDURE — 80202 ASSAY OF VANCOMYCIN: CPT | Performed by: STUDENT IN AN ORGANIZED HEALTH CARE EDUCATION/TRAINING PROGRAM

## 2025-04-13 PROCEDURE — 80053 COMPREHEN METABOLIC PANEL: CPT | Performed by: STUDENT IN AN ORGANIZED HEALTH CARE EDUCATION/TRAINING PROGRAM

## 2025-04-13 PROCEDURE — 36415 COLL VENOUS BLD VENIPUNCTURE: CPT | Performed by: HOSPITALIST

## 2025-04-13 PROCEDURE — 86850 RBC ANTIBODY SCREEN: CPT | Performed by: HOSPITALIST

## 2025-04-13 PROCEDURE — 87641 MR-STAPH DNA AMP PROBE: CPT | Performed by: STUDENT IN AN ORGANIZED HEALTH CARE EDUCATION/TRAINING PROGRAM

## 2025-04-13 PROCEDURE — 30233N1 TRANSFUSION OF NONAUTOLOGOUS RED BLOOD CELLS INTO PERIPHERAL VEIN, PERCUTANEOUS APPROACH: ICD-10-PCS | Performed by: STUDENT IN AN ORGANIZED HEALTH CARE EDUCATION/TRAINING PROGRAM

## 2025-04-13 RX ORDER — HYDROCODONE BITARTRATE AND ACETAMINOPHEN 500; 5 MG/1; MG/1
TABLET ORAL
Status: DISCONTINUED | OUTPATIENT
Start: 2025-04-13 | End: 2025-04-15 | Stop reason: HOSPADM

## 2025-04-13 RX ORDER — HYDROMORPHONE HYDROCHLORIDE 2 MG/ML
0.5 INJECTION, SOLUTION INTRAMUSCULAR; INTRAVENOUS; SUBCUTANEOUS EVERY 6 HOURS PRN
Status: DISCONTINUED | OUTPATIENT
Start: 2025-04-13 | End: 2025-04-15 | Stop reason: HOSPADM

## 2025-04-13 RX ORDER — METRONIDAZOLE 500 MG/1
500 TABLET ORAL EVERY 12 HOURS
Status: DISCONTINUED | OUTPATIENT
Start: 2025-04-13 | End: 2025-04-15 | Stop reason: HOSPADM

## 2025-04-13 RX ADMIN — OXYCODONE HYDROCHLORIDE AND ACETAMINOPHEN 1 TABLET: 7.5; 325 TABLET ORAL at 12:04

## 2025-04-13 RX ADMIN — METRONIDAZOLE 500 MG: 500 TABLET ORAL at 11:04

## 2025-04-13 RX ADMIN — PIPERACILLIN SODIUM AND TAZOBACTAM SODIUM 4.5 G: 4; .5 INJECTION, POWDER, FOR SOLUTION INTRAVENOUS at 07:04

## 2025-04-13 RX ADMIN — DOCUSATE SODIUM 50 MG: 50 CAPSULE, LIQUID FILLED ORAL at 09:04

## 2025-04-13 RX ADMIN — Medication 324 MG: at 09:04

## 2025-04-13 RX ADMIN — HYDROMORPHONE HYDROCHLORIDE 0.5 MG: 2 INJECTION, SOLUTION INTRAMUSCULAR; INTRAVENOUS; SUBCUTANEOUS at 04:04

## 2025-04-13 RX ADMIN — ACETAMINOPHEN 500 MG: 500 TABLET ORAL at 12:04

## 2025-04-13 RX ADMIN — PIPERACILLIN SODIUM AND TAZOBACTAM SODIUM 4.5 G: 4; .5 INJECTION, POWDER, FOR SOLUTION INTRAVENOUS at 12:04

## 2025-04-13 RX ADMIN — VANCOMYCIN HYDROCHLORIDE 750 MG: 750 INJECTION, POWDER, LYOPHILIZED, FOR SOLUTION INTRAVENOUS at 09:04

## 2025-04-13 RX ADMIN — VANCOMYCIN HYDROCHLORIDE 1000 MG: 1 INJECTION, POWDER, LYOPHILIZED, FOR SOLUTION INTRAVENOUS at 05:04

## 2025-04-13 NOTE — ASSESSMENT & PLAN NOTE
Anemia is likely due to unknown cause. Most recent hemoglobin and hematocrit are listed below.  - underwent EGD an C-scope (unremarkable with exception of acute and chronic gastritis) but questionable H. Pylori findings  - patient states imaging was performed for constipation  - repeat H. Pylori for reassessment  - awaiting repeat CBC to ensure below lab accurate; type and screen and getting consent for transfusion in event it is accurate; repeat was 8.6 consideration of serial CBC if appropriate  - found to be markedly iron deficient and initiating replacement regimen  - additional fall in Hgb and patient states she has menorrhagia and dysmenorrhea which she is experiencing now; low suspicion for GI bleed at this time but careful observation given abdominal pain  - requiring 1u pRBCs on 4/13    Recent Labs   Lab 04/13/25  0513   WBC 5.24   RBC 3.30*   HGB 6.9*   HCT 24.2*      MCV 73*   MCH 20.9*   MCHC 28.5*       Plan  - Monitor serial CBC: Daily  - Transfuse PRBC if patient becomes hemodynamically unstable, symptomatic or H/H drops below 7/21.

## 2025-04-13 NOTE — NURSING
Secure chat sent to  to inform of pt's H&H this am. JOSE Pineda, OC informed. Awaiting response. Will report to oncoming nurse.

## 2025-04-13 NOTE — SUBJECTIVE & OBJECTIVE
Interval History: Patient's hemoglobin 6.9 this morning in setting of menorrhagia which is typical for her.  No additional signs or symptoms of bleeding and remains hemodynamically stable and comfortable.  Endorses further improvement in abdominal pain and distension.    Review of Systems   Constitutional:  Negative for chills and fever.   Respiratory:  Negative for cough and shortness of breath.    Cardiovascular:  Negative for chest pain and palpitations.   Gastrointestinal:  Positive for abdominal distention. Negative for blood in stool, constipation and vomiting.   Genitourinary:  Positive for menstrual problem and vaginal bleeding (Menorrhagia which is typical for her). Negative for hematuria.   Musculoskeletal:  Negative for arthralgias and back pain.   Neurological:  Negative for light-headedness and headaches.   Psychiatric/Behavioral:  Negative for agitation and behavioral problems.      Objective:     Vital Signs (Most Recent):  Temp: 98.6 °F (37 °C) (04/13/25 0456)  Pulse: 71 (04/13/25 0456)  Resp: 16 (04/12/25 2151)  BP: 104/64 (04/13/25 0456)  SpO2: 99 % (04/13/25 0456) Vital Signs (24h Range):  Temp:  [98.1 °F (36.7 °C)-99.4 °F (37.4 °C)] 98.6 °F (37 °C)  Pulse:  [67-71] 71  Resp:  [15-16] 16  SpO2:  [96 %-99 %] 99 %  BP: (104-120)/(63-73) 104/64     Weight: 49.3 kg (108 lb 11 oz)  Body mass index is 17.02 kg/m².  No intake or output data in the 24 hours ending 04/13/25 0737      Physical Exam  Constitutional:       General: She is not in acute distress.     Appearance: She is not ill-appearing or toxic-appearing.   HENT:      Head: Normocephalic and atraumatic.   Eyes:      Extraocular Movements: Extraocular movements intact.   Cardiovascular:      Rate and Rhythm: Normal rate and regular rhythm.   Pulmonary:      Effort: Pulmonary effort is normal. No respiratory distress.   Abdominal:      General: There is distension (decreased).      Tenderness: There is no abdominal tenderness. There is no  guarding.   Musculoskeletal:      Right lower leg: No edema.      Left lower leg: No edema.   Skin:     Coloration: Skin is not pale.   Neurological:      General: No focal deficit present.      Mental Status: She is alert and oriented to person, place, and time.   Psychiatric:      Comments: Flat affect               Significant Labs: All pertinent labs within the past 24 hours have been reviewed.  CBC:   Recent Labs   Lab 04/11/25  1456 04/12/25  0308 04/13/25  0513   WBC 5.35 4.93 5.24   HGB 8.6* 7.2* 6.9*   HCT 30.6* 25.5* 24.2*    [phone]       Significant Imaging: I have reviewed all pertinent imaging results/findings within the past 24 hours.

## 2025-04-13 NOTE — PROGRESS NOTES
Pharmacokinetic Assessment Follow Up: IV Vancomycin    Vancomycin serum concentration assessment(s):    The trough level was drawn correctly and can be used to guide therapy at this time. The measurement is below the desired definitive target range of 10 to 20 mcg/mL.    Vancomycin Regimen Plan:    -Change regimen to Vancomycin 1000 mg IV every 8 hours   -Next serum trough concentration measured prior to the 4th dose on 4/14 at 1700    Drug levels (last 3 results):  Recent Labs   Lab Result Units 04/13/25  0913   Vancomycin Trough ug/ml 5.2*       Pharmacy will continue to follow and monitor vancomycin.    Please contact pharmacy at extension [phone] for questions regarding this assessment.    Thank you for the consult,   Nathan Fontenot       Patient brief summary:  Alessandro Moeller is a 19 y.o. female initiated on antimicrobial therapy with IV Vancomycin for treatment of intra-abdominal infection    The patient's current regimen is vancomycin 750mg IV Q12h    Drug Allergies:   Review of patient's allergies indicates:  No Known Allergies    Actual Body Weight:   49.3 kg    Renal Function:   Estimated Creatinine Clearance: 98.9 mL/min (based on SCr of 0.7 mg/dL).,     Dialysis Method (if applicable):  N/A    CBC (last 72 hours):  Recent Labs   Lab Result Units 04/11/25  0510 04/11/25  1456 04/12/25  0308 04/13/25  0513   WBC K/uL 5.40 5.35 4.93 5.24   HGB gm/dL 7.0* 8.6* 7.2* 6.9*   HCT % 24.7* 30.6* 25.5* 24.2*   Platelet Count K/uL 333 417 367 328   Lymph % % 24.6 29.7 33.7 36.5   Mono % % 9.1 3.0* 7.7 7.6   Eos % % 0.0 0.2 2.2 0.2   Basophil % % 0.4 0.4 0.4 0.2       Metabolic Panel (last 72 hours):  Recent Labs   Lab Result Units 04/11/25  0510 04/12/25  0308 04/13/25  0513   Sodium mmol/L 139 140 139   Potassium mmol/L 3.6 3.4* 3.6   Chloride mmol/L 109 108 109   CO2 mmol/L 24 25 26   Glucose mg/dL 98 95 72   BUN mg/dL 12 13 7   Creatinine mg/dL 0.7 0.8 0.7   Albumin g/dL 3.0* 3.1* 3.0*   Bilirubin Total mg/dL 0.1  0.1 0.1   ALP unit/L 38* 48 35*   AST unit/L 14 17 15   ALT unit/L 8* 8* 8*       Vancomycin Administrations:  vancomycin given in the last 96 hours                     vancomycin 750 mg in 0.9% NaCl 250 mL IVPB (admixture device) (mg) 750 mg New Bag 04/13/25 0957     750 mg New Bag 04/12/25 2152     750 mg New Bag  0926    vancomycin (VANCOCIN) 1,000 mg in 0.9% NaCl 250 mL IVPB (admixture device) (mg) 1,000 mg New Bag 04/11/25 2213                    Microbiologic Results:  Microbiology Results (last 7 days)       Procedure Component Value Units Date/Time    MRSA Screen by PCR [[id number]]     Order Status: Sent Specimen: Nasal Swab     Culture, Anaerobic [[id number]] Collected: 04/11/25 1212    Order Status: Completed Specimen: Body Fluid from Pancreatic Fluid Updated: 04/13/25 1042     Anaerobe Culture Culture In Progress    Aerobic culture [[id number]] Collected: 04/11/25 1212    Order Status: Completed Specimen: Body Fluid from Pancreatic Fluid Updated: 04/13/25 0745     CULTURE, AEROBIC No Growth To Date    Aerobic culture (Specify Source) **CANNOT BE ORDERED AS STAT* [[id number]] Collected: 04/10/25 0957    Order Status: Completed Specimen: Abscess from Vagina Updated: 04/12/25 1221     CULTURE, AEROBIC No Growth    Gram stain [[id number]] Collected: 04/11/25 1212    Order Status: Completed Specimen: Body Fluid from Pancreatic Fluid Updated: 04/12/25 0922     GRAM STAIN No WBCs, epithelial cells or organisms seen    Urine culture [[id number]] Collected: 04/10/25 0836    Order Status: Completed Specimen: Urine, Clean Catch Updated: 04/11/25 2054     Urine Culture No Growth    Fungus culture [[id number]] Collected: 04/11/25 1212    Order Status: Sent Specimen: Body Fluid from Pancreatic Fluid Updated: 04/11/25 1514    AFB Culture & Smear [[id number]]     Order Status: Sent Specimen: Abscess from Abdomen     Culture, Anaerobe [[id number]]     Order Status: Sent Specimen: Body Fluid from Abdomen     Aerobic  culture [[id number]]     Order Status: Sent Specimen: Abscess from Abdomen     Gram stain [[id number]]     Order Status: Sent Specimen: Body Fluid from Abdomen     Culture, Anaerobic [[id number]] Collected: 04/10/25 0957    Order Status: Completed Specimen: Abscess from Vagina Updated: 04/11/25 0752     Anaerobe Culture Culture In Progress    Influenza A & B by Molecular [[id number]]  (Normal) Collected: 04/10/25 0903    Order Status: Completed Specimen: Nasal Swab Updated: 04/10/25 0957     INFLUENZA A MOLECULAR Negative     INFLUENZA B MOLECULAR  Negative    Wet Prep, Genital (Vaginal Screen) [[id number]]  (Abnormal) Collected: 04/10/25 0903    Order Status: Completed Specimen: Genital from Cervicovaginal Updated: 04/10/25 0953     WBC Many     Bacteria Few     Clue Cells None     TRICHOMONAS  Few     BUDDING YEAST None     FUNGAL HYPHAE None    Aerobic culture (Specify Source) **CANNOT BE ORDERED AS STAT* [[id number]]     Order Status: Canceled Specimen: Abscess from Vagina

## 2025-04-13 NOTE — ASSESSMENT & PLAN NOTE
Nutrition consulted. Most recent weight and BMI monitored-     Measurements:  Wt Readings from Last 1 Encounters:   04/11/25 49.3 kg (108 lb 11 oz)   Body mass index is 17.02 kg/m².    Patient has been screened and assessed by RD.    Malnutrition Type:  Context: social/environmental circumstances  Level: moderate    Malnutrition Characteristic Summary:  Weight Loss (Malnutrition):  (6.9% weight loss x 1 week)  Subcutaneous Fat (Malnutrition): mild depletion  Muscle Mass (Malnutrition): mild depletion    Interventions/Recommendations (treatment strategy):  1.)

## 2025-04-13 NOTE — PROGRESS NOTES
[facility]  Progress Note    Patient Name: Alessandro Moeller  MRN: [id number]  Patient Class: IP- Inpatient   Admission Date: 4/10/2025  Length of Stay: 3 days  Attending Physician: Ascencion Solomon MD  Primary Care Provider: Ankita, Primary Doctor        Subjective     Principal Problem:Intraabdominal fluid collection        HPI:  19F with past medical history of recurrent Bartholin cysts, microcytic anemia, Postural Proteinuria presenting with a 3 day history of abdominal pain.  She underwent drainage for recurrent Bartholin gland abscess on 4/3/2025, provided with Rocephin and Doxycycline, and was started on Keflex for Group b Strep urinary tract infection at same time.  She presents with a 3 day history of poorly localized abdominal pain, that is occasionally dull and occasionally sharp in nature,  is accompanied by subjective fever, chills, an episode of emesis.  She denies continued dysuria after recent UTI. She denies diarrhea, trauma to region, and sick contacts with similar symptoms. Ct abdomen Pelvis obtained in ED and findings include labial fluid and air collection, right lower quadrant fluid and air collection, and pre-sacral complex fluid and air collection.    In ED, patient is afebrile, without leukocytosis, and hemodynamically stable. General surgery was consulted and deem no surgical intervention.  Ob/Gyn consulted and performed drainage of labial abscess and recommend MRI Pelvis to assess for occult tubo-ovarian abscess.    She was given a liter of IVF, provided with morphine for pain control, given Flagyl (vaginal wet prep positive for trichomonas as well as bacteria), and will be admitted to Hospital Medicine for further assessment, treatment, as well as consideration of Interventional Radiology for drainage and sampling of fluid collection.    Overview/Hospital Course:  Patient underwent sample collection with Interventional Radiology and studies sent.  Difficulty in finding  window yielded small fluid collection, yielding more diagnostic than therapeutic results.  Despite patient being without leukocytosis, afebrile, and hemodynamically stable, given that nature of fluid accumulation is unknown, it would be prudent to initiate broad spectrum antibiotics. Vancomycin and Zosyn initiated.  Requiring transfusion of 1u blood given Hgb <7 in setting of menorrhagia.    Interval History: Patient's hemoglobin 6.9 this morning in setting of menorrhagia which is typical for her.  No additional signs or symptoms of bleeding and remains hemodynamically stable and comfortable.  Endorses further improvement in abdominal pain and distension.    Review of Systems   Constitutional:  Negative for chills and fever.   Respiratory:  Negative for cough and shortness of breath.    Cardiovascular:  Negative for chest pain and palpitations.   Gastrointestinal:  Positive for abdominal distention. Negative for blood in stool, constipation and vomiting.   Genitourinary:  Positive for menstrual problem and vaginal bleeding (Menorrhagia which is typical for her). Negative for hematuria.   Musculoskeletal:  Negative for arthralgias and back pain.   Neurological:  Negative for light-headedness and headaches.   Psychiatric/Behavioral:  Negative for agitation and behavioral problems.      Objective:     Vital Signs (Most Recent):  Temp: 98.6 °F (37 °C) (04/13/25 0456)  Pulse: 71 (04/13/25 0456)  Resp: 16 (04/12/25 2151)  BP: 104/64 (04/13/25 0456)  SpO2: 99 % (04/13/25 0456) Vital Signs (24h Range):  Temp:  [98.1 °F (36.7 °C)-99.4 °F (37.4 °C)] 98.6 °F (37 °C)  Pulse:  [67-71] 71  Resp:  [15-16] 16  SpO2:  [96 %-99 %] 99 %  BP: (104-120)/(63-73) 104/64     Weight: 49.3 kg (108 lb 11 oz)  Body mass index is 17.02 kg/m².  No intake or output data in the 24 hours ending 04/13/25 0737      Physical Exam  Constitutional:       General: She is not in acute distress.     Appearance: She is not ill-appearing or toxic-appearing.    HENT:      Head: Normocephalic and atraumatic.   Eyes:      Extraocular Movements: Extraocular movements intact.   Cardiovascular:      Rate and Rhythm: Normal rate and regular rhythm.   Pulmonary:      Effort: Pulmonary effort is normal. No respiratory distress.   Abdominal:      General: There is distension (decreased).      Tenderness: There is no abdominal tenderness. There is no guarding.   Musculoskeletal:      Right lower leg: No edema.      Left lower leg: No edema.   Skin:     Coloration: Skin is not pale.   Neurological:      General: No focal deficit present.      Mental Status: She is alert and oriented to person, place, and time.   Psychiatric:      Comments: Flat affect               Significant Labs: All pertinent labs within the past 24 hours have been reviewed.  CBC:   Recent Labs   Lab 04/11/25  1456 04/12/25  0308 04/13/25  0513   WBC 5.35 4.93 5.24   HGB 8.6* 7.2* 6.9*   HCT 30.6* 25.5* 24.2*    367 328       Significant Imaging: I have reviewed all pertinent imaging results/findings within the past 24 hours.      Assessment & Plan  Intraabdominal fluid collection  - patient with recurrent labial abscess presenting with marked abdominal pain for 3 days with recent drainage of labial abscess  - important findings on CT Abd/Pelvis: 3.4 x 2.4 cm left labial fluid and air containing collection likely with a drain partially extending into the collection; 2.6 x 1.5 cm air and fluid collection in the right lower quadrant, and additional air and fluid extending along the right anterior aspect of the sacrum  - Gen Surg deem no surgical intervention, Gyn drained labial abscess  - consult to Interventional Radiology for drainage and sampling  of site  - patient was notably recently on antibiotics (Doxycycline and received Rocephin for labial abscess, as well as keflex for GBS UTI); she remains on Doxy and Keflex to complete course  - given that patient is hemodynamically stable, without  leukocytosis, and afebrile, in addition to desire to optimize potential fluid collection culture, initially had regimen of Doxycycline, Flagyl (for trichomonas) and held IV antibiotics so as not to confound study samples of fluids; now s/p sample collection with Interventional Radiology, it would be prudent to initiate broad spectrum antibiotics: Vancomycin and Zosyn  - pain control and PRN anti-emetics  - will follow up fluid studies and proceed as appropriate: thus far Gram stain with no WBCs, epithelial cells, or organisms  - remains to appear noninfectious and pain and distension improving; remains with unknown nature of etiology of fluid collection; planning to obtain repeat imaging for reassessment    Abdominal pain  - see intra-abdominal fluid collection as above    Labial abscess  - recurrent abscesses, with  most recent drained one week ago and intitiation of antibiotics  - fluid collection as above  - Ob/Gyn drained current abscess  - transitioned from Doxycycline to broader spectrum    Trichomonal vaginitis  - vaginal Wet Prep with many WBCs, few bacteria, and few Trichomonas  - will stop Flagyl regimen as broad spectrum, including Vanc/Zosyn to cover    Microcytic anemia  Anemia is likely due to unknown cause. Most recent hemoglobin and hematocrit are listed below.  - underwent EGD an C-scope (unremarkable with exception of acute and chronic gastritis) but questionable H. Pylori findings  - patient states imaging was performed for constipation  - repeat H. Pylori for reassessment  - awaiting repeat CBC to ensure below lab accurate; type and screen and getting consent for transfusion in event it is accurate; repeat was 8.6 consideration of serial CBC if appropriate  - found to be markedly iron deficient and initiating replacement regimen  - additional fall in Hgb and patient states she has menorrhagia and dysmenorrhea which she is experiencing now; low suspicion for GI bleed at this time but careful  observation given abdominal pain  - requiring 1u pRBCs on 4/13    Recent Labs   Lab 04/13/25  0513   WBC 5.24   RBC 3.30*   HGB 6.9*   HCT 24.2*      MCV 73*   MCH 20.9*   MCHC 28.5*       Plan  - Monitor serial CBC: Daily  - Transfuse PRBC if patient becomes hemodynamically unstable, symptomatic or H/H drops below 7/21.    BMI < 18.5  - Nutrition assisting and recommend regular diet with boost supplementation    Moderate protein-calorie malnutrition  Nutrition consulted. Most recent weight and BMI monitored-     Measurements:  Wt Readings from Last 1 Encounters:   04/11/25 49.3 kg (108 lb 11 oz)   Body mass index is 17.02 kg/m².    Patient has been screened and assessed by RD.    Malnutrition Type:  Context: social/environmental circumstances  Level: moderate    Malnutrition Characteristic Summary:  Weight Loss (Malnutrition):  (6.9% weight loss x 1 week)  Subcutaneous Fat (Malnutrition): mild depletion  Muscle Mass (Malnutrition): mild depletion    Interventions/Recommendations (treatment strategy):  1.)    VTE Risk Mitigation (From admission, onward)           Ordered     Reason for No Pharmacological VTE Prophylaxis  Once        Comments: Procedure   Question:  Reasons:  Answer:  Physician Provided (leave comment)    04/10/25 1747     Place sequential compression device  Until discontinued         04/10/25 1747                    Discharge Planning   ZENAIDA: 4/15/2025     Code Status: Full Code   Medical Readiness for Discharge Date:   Discharge Plan A: Home                Ascencion Solomon MD  Department of Hospital Medicine   [facility]

## 2025-04-13 NOTE — ASSESSMENT & PLAN NOTE
- patient with recurrent labial abscess presenting with marked abdominal pain for 3 days with recent drainage of labial abscess  - important findings on CT Abd/Pelvis: 3.4 x 2.4 cm left labial fluid and air containing collection likely with a drain partially extending into the collection; 2.6 x 1.5 cm air and fluid collection in the right lower quadrant, and additional air and fluid extending along the right anterior aspect of the sacrum  - Gen Surg deem no surgical intervention, Gyn drained labial abscess  - consult to Interventional Radiology for drainage and sampling  of site  - patient was notably recently on antibiotics (Doxycycline and received Rocephin for labial abscess, as well as keflex for GBS UTI); she remains on Doxy and Keflex to complete course  - given that patient is hemodynamically stable, without leukocytosis, and afebrile, in addition to desire to optimize potential fluid collection culture, initially had regimen of Doxycycline, Flagyl (for trichomonas) and held IV antibiotics so as not to confound study samples of fluids; now s/p sample collection with Interventional Radiology, it would be prudent to initiate broad spectrum antibiotics: Vancomycin and Zosyn  - pain control and PRN anti-emetics  - will follow up fluid studies and proceed as appropriate: thus far Gram stain with no WBCs, epithelial cells, or organisms  - remains to appear noninfectious and pain and distension improving; remains with unknown nature of etiology of fluid collection; planning to obtain repeat imaging for reassessment

## 2025-04-14 PROBLEM — E87.6 HYPOKALEMIA: Status: ACTIVE | Noted: 2025-04-14

## 2025-04-14 LAB
ABSOLUTE EOSINOPHIL (OHS): 0.01 K/UL
ABSOLUTE MONOCYTE (OHS): 0.66 K/UL (ref 0.3–1)
ABSOLUTE NEUTROPHIL COUNT (OHS): 6.85 K/UL (ref 1.8–7.7)
ALBUMIN SERPL BCP-MCNC: 2.8 G/DL (ref 3.5–5.2)
ALP SERPL-CCNC: 34 UNIT/L (ref 40–150)
ALT SERPL W/O P-5'-P-CCNC: 13 UNIT/L (ref 10–44)
ANION GAP (OHS): 7 MMOL/L (ref 8–16)
AST SERPL-CCNC: 22 UNIT/L (ref 11–45)
BACTERIA SPEC AEROBE CULT: NO GROWTH
BACTERIA SPEC ANAEROBE CULT: ABNORMAL
BASOPHILS # BLD AUTO: 0.03 K/UL
BASOPHILS NFR BLD AUTO: 0.3 %
BILIRUB SERPL-MCNC: 0.2 MG/DL (ref 0.1–1)
BUN SERPL-MCNC: 9 MG/DL (ref 6–20)
CALCIUM SERPL-MCNC: 8.4 MG/DL (ref 8.7–10.5)
CHLORIDE SERPL-SCNC: 110 MMOL/L (ref 95–110)
CO2 SERPL-SCNC: 21 MMOL/L (ref 23–29)
CREAT SERPL-MCNC: 0.7 MG/DL (ref 0.5–1.4)
ERYTHROCYTE [DISTWIDTH] IN BLOOD BY AUTOMATED COUNT: 18.1 % (ref 11.5–14.5)
FUNGUS SPEC CULT: NORMAL
GFR SERPLBLD CREATININE-BSD FMLA CKD-EPI: >60 ML/MIN/1.73/M2
GLUCOSE SERPL-MCNC: 93 MG/DL (ref 70–110)
HCT VFR BLD AUTO: 28.5 % (ref 37–48.5)
HGB BLD-MCNC: 8.5 GM/DL (ref 12–16)
IMM GRANULOCYTES # BLD AUTO: 0.03 K/UL (ref 0–0.04)
IMM GRANULOCYTES NFR BLD AUTO: 0.3 % (ref 0–0.5)
LYMPHOCYTES # BLD AUTO: 1.24 K/UL (ref 1–4.8)
MCH RBC QN AUTO: 22.1 PG (ref 27–31)
MCHC RBC AUTO-ENTMCNC: 29.8 G/DL (ref 32–36)
MCV RBC AUTO: 74 FL (ref 82–98)
NUCLEATED RBC (/100WBC) (OHS): 0 /100 WBC
PLATELET # BLD AUTO: 337 K/UL (ref 150–450)
PMV BLD AUTO: 11 FL (ref 9.2–12.9)
POTASSIUM SERPL-SCNC: 3.4 MMOL/L (ref 3.5–5.1)
PROT SERPL-MCNC: 5.6 GM/DL (ref 6–8.4)
RBC # BLD AUTO: 3.85 M/UL (ref 4–5.4)
RELATIVE EOSINOPHIL (OHS): 0.1 %
RELATIVE LYMPHOCYTE (OHS): 14.1 % (ref 18–48)
RELATIVE MONOCYTE (OHS): 7.5 % (ref 4–15)
RELATIVE NEUTROPHIL (OHS): 77.7 % (ref 38–73)
SODIUM SERPL-SCNC: 138 MMOL/L (ref 136–145)
VANCOMYCIN TROUGH SERPL-MCNC: 22.7 UG/ML (ref 10–22)
WBC # BLD AUTO: 8.82 K/UL (ref 3.9–12.7)

## 2025-04-14 PROCEDURE — 63600175 PHARM REV CODE 636 W HCPCS: Performed by: STUDENT IN AN ORGANIZED HEALTH CARE EDUCATION/TRAINING PROGRAM

## 2025-04-14 PROCEDURE — 25500020 PHARM REV CODE 255

## 2025-04-14 PROCEDURE — 11000001 HC ACUTE MED/SURG PRIVATE ROOM

## 2025-04-14 PROCEDURE — 36415 COLL VENOUS BLD VENIPUNCTURE: CPT | Performed by: STUDENT IN AN ORGANIZED HEALTH CARE EDUCATION/TRAINING PROGRAM

## 2025-04-14 PROCEDURE — 25500020 PHARM REV CODE 255: Performed by: STUDENT IN AN ORGANIZED HEALTH CARE EDUCATION/TRAINING PROGRAM

## 2025-04-14 PROCEDURE — 80202 ASSAY OF VANCOMYCIN: CPT | Performed by: STUDENT IN AN ORGANIZED HEALTH CARE EDUCATION/TRAINING PROGRAM

## 2025-04-14 PROCEDURE — 93010 ELECTROCARDIOGRAM REPORT: CPT | Mod: ,,, | Performed by: INTERNAL MEDICINE

## 2025-04-14 PROCEDURE — 82040 ASSAY OF SERUM ALBUMIN: CPT | Performed by: STUDENT IN AN ORGANIZED HEALTH CARE EDUCATION/TRAINING PROGRAM

## 2025-04-14 PROCEDURE — 85025 COMPLETE CBC W/AUTO DIFF WBC: CPT | Performed by: STUDENT IN AN ORGANIZED HEALTH CARE EDUCATION/TRAINING PROGRAM

## 2025-04-14 PROCEDURE — 93005 ELECTROCARDIOGRAM TRACING: CPT

## 2025-04-14 PROCEDURE — 25000003 PHARM REV CODE 250: Performed by: STUDENT IN AN ORGANIZED HEALTH CARE EDUCATION/TRAINING PROGRAM

## 2025-04-14 RX ORDER — POTASSIUM CHLORIDE 20 MEQ/1
40 TABLET, EXTENDED RELEASE ORAL ONCE
Status: COMPLETED | OUTPATIENT
Start: 2025-04-14 | End: 2025-04-14

## 2025-04-14 RX ADMIN — VANCOMYCIN HYDROCHLORIDE 1000 MG: 1 INJECTION, POWDER, LYOPHILIZED, FOR SOLUTION INTRAVENOUS at 01:04

## 2025-04-14 RX ADMIN — IOHEXOL 75 ML: 350 INJECTION, SOLUTION INTRAVENOUS at 03:04

## 2025-04-14 RX ADMIN — ONDANSETRON 4 MG: 2 INJECTION INTRAMUSCULAR; INTRAVENOUS at 12:04

## 2025-04-14 RX ADMIN — PROMETHAZINE HYDROCHLORIDE 12.5 MG: 25 INJECTION INTRAMUSCULAR; INTRAVENOUS at 11:04

## 2025-04-14 RX ADMIN — IOHEXOL 15 ML: 350 INJECTION, SOLUTION INTRAVENOUS at 11:04

## 2025-04-14 RX ADMIN — ONDANSETRON 4 MG: 2 INJECTION INTRAMUSCULAR; INTRAVENOUS at 09:04

## 2025-04-14 RX ADMIN — Medication 324 MG: at 10:04

## 2025-04-14 RX ADMIN — DOCUSATE SODIUM 50 MG: 50 CAPSULE, LIQUID FILLED ORAL at 10:04

## 2025-04-14 RX ADMIN — PIPERACILLIN SODIUM AND TAZOBACTAM SODIUM 4.5 G: 4; .5 INJECTION, POWDER, FOR SOLUTION INTRAVENOUS at 03:04

## 2025-04-14 RX ADMIN — VANCOMYCIN HYDROCHLORIDE 1000 MG: 1 INJECTION, POWDER, LYOPHILIZED, FOR SOLUTION INTRAVENOUS at 10:04

## 2025-04-14 RX ADMIN — IOHEXOL 15 ML: 350 INJECTION, SOLUTION INTRAVENOUS at 01:04

## 2025-04-14 RX ADMIN — PIPERACILLIN SODIUM AND TAZOBACTAM SODIUM 4.5 G: 4; .5 INJECTION, POWDER, FOR SOLUTION INTRAVENOUS at 01:04

## 2025-04-14 RX ADMIN — POTASSIUM CHLORIDE 40 MEQ: 1500 TABLET, EXTENDED RELEASE ORAL at 10:04

## 2025-04-14 RX ADMIN — VANCOMYCIN HYDROCHLORIDE 1000 MG: 1 INJECTION, POWDER, LYOPHILIZED, FOR SOLUTION INTRAVENOUS at 06:04

## 2025-04-14 RX ADMIN — POTASSIUM CHLORIDE 40 MEQ: 1500 TABLET, EXTENDED RELEASE ORAL at 06:04

## 2025-04-14 RX ADMIN — METRONIDAZOLE 500 MG: 500 TABLET ORAL at 10:04

## 2025-04-14 NOTE — PROGRESS NOTES
[facility]  Progress Note    Patient Name: Alessandro Moeller  MRN: [id number]  Patient Class: IP- Inpatient   Admission Date: 4/10/2025  Length of Stay: 4 days  Attending Physician: Ascencion Solomon MD  Primary Care Provider: Ankita, Primary Doctor        Subjective     Principal Problem:Intraabdominal fluid collection        HPI:  19F with past medical history of recurrent Bartholin cysts, microcytic anemia, Postural Proteinuria presenting with a 3 day history of abdominal pain.  She underwent drainage for recurrent Bartholin gland abscess on 4/3/2025, provided with Rocephin and Doxycycline, and was started on Keflex for Group b Strep urinary tract infection at same time.  She presents with a 3 day history of poorly localized abdominal pain, that is occasionally dull and occasionally sharp in nature,  is accompanied by subjective fever, chills, an episode of emesis.  She denies continued dysuria after recent UTI. She denies diarrhea, trauma to region, and sick contacts with similar symptoms. Ct abdomen Pelvis obtained in ED and findings include labial fluid and air collection, right lower quadrant fluid and air collection, and pre-sacral complex fluid and air collection.    In ED, patient is afebrile, without leukocytosis, and hemodynamically stable. General surgery was consulted and deem no surgical intervention.  Ob/Gyn consulted and performed drainage of labial abscess and recommend MRI Pelvis to assess for occult tubo-ovarian abscess.    She was given a liter of IVF, provided with morphine for pain control, given Flagyl (vaginal wet prep positive for trichomonas as well as bacteria), and will be admitted to Hospital Medicine for further assessment, treatment, as well as consideration of Interventional Radiology for drainage and sampling of fluid collection.    Overview/Hospital Course:  Patient underwent sample collection with Interventional Radiology and studies sent.  Difficulty in finding  window yielded small fluid collection, yielding more diagnostic than therapeutic results.  Despite patient being without leukocytosis, afebrile, and hemodynamically stable, given that nature of fluid accumulation is unknown, it would be prudent to initiate broad spectrum antibiotics. Vancomycin and Zosyn initiated.  Requiring transfusion of 1u blood given Hgb <7 in setting of menorrhagia and awaiting follow up lab with most recent of 7.3.  Flagyl re-initiaed for Trichomonas infection.  Obtaining CT abd/pelvis for comparison to prior.    Interval History: Patient endorses being pain-free on current regimen and further endorses decrease in abdominal distension, also appreciable on exam.  She is eating well and has no complaints at this time.    Review of Systems   Constitutional:  Negative for chills and fever.   Respiratory:  Negative for cough and shortness of breath.    Gastrointestinal:  Negative for abdominal distention, abdominal pain and blood in stool.   Genitourinary:  Positive for vaginal bleeding (Menorrhagia). Negative for hematuria.   Musculoskeletal:  Negative for arthralgias and back pain.   Neurological:  Negative for light-headedness and headaches.   Psychiatric/Behavioral:  Negative for agitation and behavioral problems.      Objective:     Vital Signs (Most Recent):  Temp: 99.1 °F (37.3 °C) (04/14/25 0739)  Pulse: 77 (04/14/25 0739)  Resp: 15 (04/14/25 0739)  BP: (!) 135/91 (04/14/25 0739)  SpO2: 97 % (04/14/25 0739) Vital Signs (24h Range):  Temp:  [97.8 °F (36.6 °C)-99.1 °F (37.3 °C)] 99.1 °F (37.3 °C)  Pulse:  [62-90] 77  Resp:  [15-18] 15  SpO2:  [97 %-100 %] 97 %  BP: (102-135)/(62-91) 135/91     Weight: 49.3 kg (108 lb 11 oz)  Body mass index is 17.02 kg/m².  No intake or output data in the 24 hours ending 04/14/25 0843      Physical Exam  Constitutional:       General: She is not in acute distress.     Appearance: She is not toxic-appearing.   HENT:      Head: Normocephalic and atraumatic.    Eyes:      Extraocular Movements: Extraocular movements intact.   Cardiovascular:      Rate and Rhythm: Normal rate and regular rhythm.   Pulmonary:      Effort: Pulmonary effort is normal. No respiratory distress.   Abdominal:      General: Bowel sounds are normal. There is no distension.      Tenderness: There is no abdominal tenderness.   Musculoskeletal:      Right lower leg: No edema.      Left lower leg: No edema.   Neurological:      General: No focal deficit present.      Mental Status: She is alert and oriented to person, place, and time.   Psychiatric:         Mood and Affect: Mood normal.         Behavior: Behavior normal.               Significant Labs: All pertinent labs within the past 24 hours have been reviewed.  CBC:   Recent Labs   Lab 04/13/25  0513 04/13/25  1321   WBC 5.24 4.59   HGB 6.9* 7.3*   HCT 24.2* 25.8*    379     CMP:   Recent Labs   Lab 04/13/25  0513 04/14/25  0622    138   K 3.6 3.4*    110   CO2 26 21*   BUN 7 9   CREATININE 0.7 0.7   CALCIUM 8.6* 8.4*   ALBUMIN 3.0* 2.8*   BILITOT 0.1 0.2   ALKPHOS 35* 34*   AST 15 22   ALT 8* 13   ANIONGAP 4* 7*       Significant Imaging: I have reviewed all pertinent imaging results/findings within the past 24 hours.      Assessment & Plan  Intraabdominal fluid collection  - patient with recurrent labial abscess presenting with marked abdominal pain for 3 days with recent drainage of labial abscess  - important findings on CT Abd/Pelvis: 3.4 x 2.4 cm left labial fluid and air containing collection likely with a drain partially extending into the collection; 2.6 x 1.5 cm air and fluid collection in the right lower quadrant, and additional air and fluid extending along the right anterior aspect of the sacrum  - Gen Surg deem no surgical intervention, Gyn drained labial abscess  - consult to Interventional Radiology for drainage and sampling  of site  - patient was notably recently on antibiotics (Doxycycline and received  Rocephin for labial abscess, as well as keflex for GBS UTI); she remains on Doxy and Keflex to complete course  - given that patient is hemodynamically stable, without leukocytosis, and afebrile, in addition to desire to optimize potential fluid collection culture, initially had regimen of Doxycycline, Flagyl (for trichomonas) and held IV antibiotics so as not to confound study samples of fluids; now s/p sample collection with Interventional Radiology, it would be prudent to initiate broad spectrum antibiotics: Vancomycin and Zosyn  - pain control and PRN anti-emetics  - will follow up fluid studies and proceed as appropriate: thus far Gram stain with no WBCs, epithelial cells, or organisms; vaginal abscess aerobic cx from 4/10 NGTD  - remains to appear noninfectious and pain and distension improving; remains with unknown nature of etiology of fluid collection; planning to obtain repeat imaging for reassessment today; Flagyl re-initiated for trichomonal infection    Abdominal pain  - see intra-abdominal fluid collection as above    Labial abscess  - recurrent abscesses, with  most recent drained one week ago and intitiation of antibiotics  - fluid collection as above  - Ob/Gyn drained current abscess  - transitioned from Doxycycline to broader spectrum    Trichomonal vaginitis  - vaginal Wet Prep with many WBCs, few bacteria, and few Trichomonas  - Flagul resumed for 500 mg BID for 7 days    Microcytic anemia  Anemia is likely due to unknown cause. Most recent hemoglobin and hematocrit are listed below.  - underwent EGD an C-scope (unremarkable with exception of acute and chronic gastritis) but questionable H. Pylori findings  - patient states imaging was performed for constipation  - repeat H. Pylori for reassessment  - awaiting repeat CBC to ensure below lab accurate; type and screen and getting consent for transfusion in event it is accurate; repeat was 8.6 consideration of serial CBC if appropriate  - found to  be markedly iron deficient and initiating replacement regimen  - additional fall in Hgb and patient states she has menorrhagia and dysmenorrhea which she is experiencing now; low suspicion for GI bleed at this time but careful observation given abdominal pain  - requiring 1u pRBCs on 4/13 and came up to >7; awaiting repeat    Recent Labs   Lab 04/13/25  1321   WBC 4.59   RBC 3.52*   HGB 7.3*   HCT 25.8*      MCV 73*   MCH 20.7*   MCHC 28.3*       Plan  - Monitor serial CBC: Daily  - Transfuse PRBC if patient becomes hemodynamically unstable, symptomatic or H/H drops below 7/21.    BMI < 18.5  - Nutrition assisting and recommend regular diet with boost supplementation    Moderate protein-calorie malnutrition  Nutrition consulted. Most recent weight and BMI monitored-     Measurements:  Wt Readings from Last 1 Encounters:   04/11/25 49.3 kg (108 lb 11 oz)   Body mass index is 17.02 kg/m².    Patient has been screened and assessed by RD.    Malnutrition Type:  Context: social/environmental circumstances  Level: moderate    Malnutrition Characteristic Summary:  Weight Loss (Malnutrition):  (6.9% weight loss x 1 week)  Subcutaneous Fat (Malnutrition): mild depletion  Muscle Mass (Malnutrition): mild depletion    Interventions/Recommendations (treatment strategy):  1.)  Nutrition consulted and Boost initiated  Hypokalemia  Patient's most recent potassium results are listed below.   Recent Labs     04/12/25  0308 04/13/25  0513 04/14/25  0622   K 3.4* 3.6 3.4*     Plan  - Replete potassium per protocol  - Monitor potassium Daily  - Patient's hypokalemia is worsening. Will continue current treatment  - PO replacement  VTE Risk Mitigation (From admission, onward)           Ordered     Reason for No Pharmacological VTE Prophylaxis  Once        Comments: Procedure   Question:  Reasons:  Answer:  Physician Provided (leave comment)    04/10/25 1747     Place sequential compression device  Until discontinued          04/10/25 1747                    Discharge Planning   ZENAIDA: 4/15/2025     Code Status: Full Code   Medical Readiness for Discharge Date:   Discharge Plan A: Home          Ascencion Solomon MD  Department of Hospital Medicine   [facility]

## 2025-04-14 NOTE — SUBJECTIVE & OBJECTIVE
Interval History: Patient endorses being pain-free on current regimen and further endorses decrease in abdominal distension, also appreciable on exam.  She is eating well and has no complaints at this time.    Review of Systems   Constitutional:  Negative for chills and fever.   Respiratory:  Negative for cough and shortness of breath.    Gastrointestinal:  Negative for abdominal distention, abdominal pain and blood in stool.   Genitourinary:  Positive for vaginal bleeding (Menorrhagia). Negative for hematuria.   Musculoskeletal:  Negative for arthralgias and back pain.   Neurological:  Negative for light-headedness and headaches.   Psychiatric/Behavioral:  Negative for agitation and behavioral problems.      Objective:     Vital Signs (Most Recent):  Temp: 99.1 °F (37.3 °C) (04/14/25 0739)  Pulse: 77 (04/14/25 0739)  Resp: 15 (04/14/25 0739)  BP: (!) 135/91 (04/14/25 0739)  SpO2: 97 % (04/14/25 0739) Vital Signs (24h Range):  Temp:  [97.8 °F (36.6 °C)-99.1 °F (37.3 °C)] 99.1 °F (37.3 °C)  Pulse:  [62-90] 77  Resp:  [15-18] 15  SpO2:  [97 %-100 %] 97 %  BP: (102-135)/(62-91) 135/91     Weight: 49.3 kg (108 lb 11 oz)  Body mass index is 17.02 kg/m².  No intake or output data in the 24 hours ending 04/14/25 0843      Physical Exam  Constitutional:       General: She is not in acute distress.     Appearance: She is not toxic-appearing.   HENT:      Head: Normocephalic and atraumatic.   Eyes:      Extraocular Movements: Extraocular movements intact.   Cardiovascular:      Rate and Rhythm: Normal rate and regular rhythm.   Pulmonary:      Effort: Pulmonary effort is normal. No respiratory distress.   Abdominal:      General: Bowel sounds are normal. There is no distension.      Tenderness: There is no abdominal tenderness.   Musculoskeletal:      Right lower leg: No edema.      Left lower leg: No edema.   Neurological:      General: No focal deficit present.      Mental Status: She is alert and oriented to person, place,  and time.   Psychiatric:         Mood and Affect: Mood normal.         Behavior: Behavior normal.               Significant Labs: All pertinent labs within the past 24 hours have been reviewed.  CBC:   Recent Labs   Lab 04/13/25  0513 04/13/25  1321   WBC 5.24 4.59   HGB 6.9* 7.3*   HCT 24.2* 25.8*    379     CMP:   Recent Labs   Lab 04/13/25  0513 04/14/25  0622    138   K 3.6 3.4*    110   CO2 26 21*   BUN 7 9   CREATININE 0.7 0.7   CALCIUM 8.6* 8.4*   ALBUMIN 3.0* 2.8*   BILITOT 0.1 0.2   ALKPHOS 35* 34*   AST 15 22   ALT 8* 13   ANIONGAP 4* 7*       Significant Imaging: I have reviewed all pertinent imaging results/findings within the past 24 hours.

## 2025-04-14 NOTE — NURSING
Patient has had several episodes of emesis on shift. Zofran given and did not offer relief, MD notified and new orders were placed.     Unable to start oral contrast at this time due to emesis. Will contact CT when patient feels relief.

## 2025-04-14 NOTE — ASSESSMENT & PLAN NOTE
- vaginal Wet Prep with many WBCs, few bacteria, and few Trichomonas  - Flagul resumed for 500 mg BID for 7 days

## 2025-04-14 NOTE — PLAN OF CARE
04/14/25 1651   Post-Acute Status   Post-Acute Authorization Other   Coverage [facility] CHO[facility]   Other Status No Post-Acute Service Needs   Discharge Delays None known at this time   Discharge Plan   Discharge Plan A Home with family   Discharge Plan B Home     Sw met with pt and family at bedside. Pt is not medically ready for dc at this time. There are no needs form case management at this time. Sw to follow up.     Discharge Plan A and Plan B have been determined by review of patient's clinical status, future medical and therapeutic needs, and coverage/benefits for post-acute care in coordination with multidisciplinary team members.    DIANA Martin  Case Management  [phone]

## 2025-04-14 NOTE — NURSING
Patient refused lab draw (vanc trough). I went in and spoke to patient and again explained the importance of the labs tests and that she should not refuse without good reason- she verbalized understanding and agreed to have lab come back.

## 2025-04-14 NOTE — ASSESSMENT & PLAN NOTE
- patient with recurrent labial abscess presenting with marked abdominal pain for 3 days with recent drainage of labial abscess  - important findings on CT Abd/Pelvis: 3.4 x 2.4 cm left labial fluid and air containing collection likely with a drain partially extending into the collection; 2.6 x 1.5 cm air and fluid collection in the right lower quadrant, and additional air and fluid extending along the right anterior aspect of the sacrum  - Gen Surg deem no surgical intervention, Gyn drained labial abscess  - consult to Interventional Radiology for drainage and sampling  of site  - patient was notably recently on antibiotics (Doxycycline and received Rocephin for labial abscess, as well as keflex for GBS UTI); she remains on Doxy and Keflex to complete course  - given that patient is hemodynamically stable, without leukocytosis, and afebrile, in addition to desire to optimize potential fluid collection culture, initially had regimen of Doxycycline, Flagyl (for trichomonas) and held IV antibiotics so as not to confound study samples of fluids; now s/p sample collection with Interventional Radiology, it would be prudent to initiate broad spectrum antibiotics: Vancomycin and Zosyn  - pain control and PRN anti-emetics  - will follow up fluid studies and proceed as appropriate: thus far Gram stain with no WBCs, epithelial cells, or organisms; vaginal abscess aerobic cx from 4/10 NGTD  - remains to appear noninfectious and pain and distension improving; remains with unknown nature of etiology of fluid collection; planning to obtain repeat imaging for reassessment today; Flagyl re-initiated for trichomonal infection

## 2025-04-14 NOTE — ASSESSMENT & PLAN NOTE
Nutrition consulted. Most recent weight and BMI monitored-     Measurements:  Wt Readings from Last 1 Encounters:   04/11/25 49.3 kg (108 lb 11 oz)   Body mass index is 17.02 kg/m².    Patient has been screened and assessed by RD.    Malnutrition Type:  Context: social/environmental circumstances  Level: moderate    Malnutrition Characteristic Summary:  Weight Loss (Malnutrition):  (6.9% weight loss x 1 week)  Subcutaneous Fat (Malnutrition): mild depletion  Muscle Mass (Malnutrition): mild depletion    Interventions/Recommendations (treatment strategy):  1.)  Nutrition consulted and Boost initiated

## 2025-04-14 NOTE — ASSESSMENT & PLAN NOTE
Anemia is likely due to unknown cause. Most recent hemoglobin and hematocrit are listed below.  - underwent EGD an C-scope (unremarkable with exception of acute and chronic gastritis) but questionable H. Pylori findings  - patient states imaging was performed for constipation  - repeat H. Pylori for reassessment  - awaiting repeat CBC to ensure below lab accurate; type and screen and getting consent for transfusion in event it is accurate; repeat was 8.6 consideration of serial CBC if appropriate  - found to be markedly iron deficient and initiating replacement regimen  - additional fall in Hgb and patient states she has menorrhagia and dysmenorrhea which she is experiencing now; low suspicion for GI bleed at this time but careful observation given abdominal pain  - requiring 1u pRBCs on 4/13 and came up to >7; awaiting repeat    Recent Labs   Lab 04/13/25  1321   WBC 4.59   RBC 3.52*   HGB 7.3*   HCT 25.8*      MCV 73*   MCH 20.7*   MCHC 28.3*       Plan  - Monitor serial CBC: Daily  - Transfuse PRBC if patient becomes hemodynamically unstable, symptomatic or H/H drops below 7/21.

## 2025-04-14 NOTE — ASSESSMENT & PLAN NOTE
Patient's most recent potassium results are listed below.   Recent Labs     04/12/25  0308 04/13/25  0513 04/14/25  0622   K 3.4* 3.6 3.4*     Plan  - Replete potassium per protocol  - Monitor potassium Daily  - Patient's hypokalemia is worsening. Will continue current treatment  - PO replacement

## 2025-04-15 VITALS
RESPIRATION RATE: 15 BRPM | OXYGEN SATURATION: 98 % | BODY MASS INDEX: 17.06 KG/M2 | TEMPERATURE: 99 F | HEIGHT: 67 IN | DIASTOLIC BLOOD PRESSURE: 78 MMHG | WEIGHT: 108.69 LBS | HEART RATE: 67 BPM | SYSTOLIC BLOOD PRESSURE: 130 MMHG

## 2025-04-15 PROBLEM — R10.9 ABDOMINAL PAIN: Status: RESOLVED | Noted: 2021-08-06 | Resolved: 2025-04-15

## 2025-04-15 PROBLEM — E87.6 HYPOKALEMIA: Status: RESOLVED | Noted: 2025-04-14 | Resolved: 2025-04-15

## 2025-04-15 LAB
ABSOLUTE EOSINOPHIL (OHS): 0.01 K/UL
ABSOLUTE MONOCYTE (OHS): 0.85 K/UL (ref 0.3–1)
ABSOLUTE NEUTROPHIL COUNT (OHS): 7.24 K/UL (ref 1.8–7.7)
ALBUMIN SERPL BCP-MCNC: 2.9 G/DL (ref 3.5–5.2)
ALP SERPL-CCNC: 36 UNIT/L (ref 40–150)
ALT SERPL W/O P-5'-P-CCNC: 12 UNIT/L (ref 10–44)
ANION GAP (OHS): 6 MMOL/L (ref 8–16)
AST SERPL-CCNC: 23 UNIT/L (ref 11–45)
BACTERIA SPEC AEROBE CULT: NO GROWTH
BASOPHILS # BLD AUTO: 0.02 K/UL
BASOPHILS NFR BLD AUTO: 0.2 %
BILIRUB SERPL-MCNC: 0.3 MG/DL (ref 0.1–1)
BUN SERPL-MCNC: 5 MG/DL (ref 6–20)
CALCIUM SERPL-MCNC: 8.7 MG/DL (ref 8.7–10.5)
CHLORIDE SERPL-SCNC: 112 MMOL/L (ref 95–110)
CO2 SERPL-SCNC: 24 MMOL/L (ref 23–29)
CREAT SERPL-MCNC: 0.9 MG/DL (ref 0.5–1.4)
ERYTHROCYTE [DISTWIDTH] IN BLOOD BY AUTOMATED COUNT: 18.8 % (ref 11.5–14.5)
GFR SERPLBLD CREATININE-BSD FMLA CKD-EPI: >60 ML/MIN/1.73/M2
GLUCOSE SERPL-MCNC: 94 MG/DL (ref 70–110)
HCT VFR BLD AUTO: 30.1 % (ref 37–48.5)
HGB BLD-MCNC: 9 GM/DL (ref 12–16)
IMM GRANULOCYTES # BLD AUTO: 0.02 K/UL (ref 0–0.04)
IMM GRANULOCYTES NFR BLD AUTO: 0.2 % (ref 0–0.5)
LYMPHOCYTES # BLD AUTO: 1.22 K/UL (ref 1–4.8)
MCH RBC QN AUTO: 22.3 PG (ref 27–31)
MCHC RBC AUTO-ENTMCNC: 29.9 G/DL (ref 32–36)
MCV RBC AUTO: 75 FL (ref 82–98)
NUCLEATED RBC (/100WBC) (OHS): 0 /100 WBC
OHS QRS DURATION: 86 MS
OHS QTC CALCULATION: 422 MS
PLATELET # BLD AUTO: 315 K/UL (ref 150–450)
PMV BLD AUTO: 10.4 FL (ref 9.2–12.9)
POTASSIUM SERPL-SCNC: 3.3 MMOL/L (ref 3.5–5.1)
PROT SERPL-MCNC: 6 GM/DL (ref 6–8.4)
RBC # BLD AUTO: 4.03 M/UL (ref 4–5.4)
RELATIVE EOSINOPHIL (OHS): 0.1 %
RELATIVE LYMPHOCYTE (OHS): 13 % (ref 18–48)
RELATIVE MONOCYTE (OHS): 9.1 % (ref 4–15)
RELATIVE NEUTROPHIL (OHS): 77.4 % (ref 38–73)
SODIUM SERPL-SCNC: 142 MMOL/L (ref 136–145)
WBC # BLD AUTO: 9.36 K/UL (ref 3.9–12.7)

## 2025-04-15 PROCEDURE — 36415 COLL VENOUS BLD VENIPUNCTURE: CPT | Performed by: STUDENT IN AN ORGANIZED HEALTH CARE EDUCATION/TRAINING PROGRAM

## 2025-04-15 PROCEDURE — 63600175 PHARM REV CODE 636 W HCPCS: Performed by: STUDENT IN AN ORGANIZED HEALTH CARE EDUCATION/TRAINING PROGRAM

## 2025-04-15 PROCEDURE — 25000003 PHARM REV CODE 250: Performed by: STUDENT IN AN ORGANIZED HEALTH CARE EDUCATION/TRAINING PROGRAM

## 2025-04-15 PROCEDURE — 84450 TRANSFERASE (AST) (SGOT): CPT | Performed by: STUDENT IN AN ORGANIZED HEALTH CARE EDUCATION/TRAINING PROGRAM

## 2025-04-15 PROCEDURE — 85025 COMPLETE CBC W/AUTO DIFF WBC: CPT | Performed by: STUDENT IN AN ORGANIZED HEALTH CARE EDUCATION/TRAINING PROGRAM

## 2025-04-15 RX ORDER — DOXYCYCLINE 100 MG/1
100 CAPSULE ORAL 2 TIMES DAILY
Qty: 18 CAPSULE | Refills: 0 | Status: SHIPPED | OUTPATIENT
Start: 2025-04-15 | End: 2025-04-24

## 2025-04-15 RX ORDER — METRONIDAZOLE 500 MG/1
500 TABLET ORAL EVERY 12 HOURS
Qty: 12 TABLET | Refills: 0 | Status: SHIPPED | OUTPATIENT
Start: 2025-04-15 | End: 2025-04-21

## 2025-04-15 RX ORDER — FERROUS GLUCONATE 324(37.5)
324 TABLET ORAL
Qty: 30 TABLET | Refills: 11 | Status: SHIPPED | OUTPATIENT
Start: 2025-04-16 | End: 2026-04-16

## 2025-04-15 RX ADMIN — PIPERACILLIN SODIUM AND TAZOBACTAM SODIUM 4.5 G: 4; .5 INJECTION, POWDER, FOR SOLUTION INTRAVENOUS at 08:04

## 2025-04-15 RX ADMIN — DOCUSATE SODIUM 50 MG: 50 CAPSULE, LIQUID FILLED ORAL at 08:04

## 2025-04-15 RX ADMIN — Medication 324 MG: at 08:04

## 2025-04-15 RX ADMIN — METRONIDAZOLE 500 MG: 500 TABLET ORAL at 08:04

## 2025-04-15 RX ADMIN — PIPERACILLIN SODIUM AND TAZOBACTAM SODIUM 4.5 G: 4; .5 INJECTION, POWDER, FOR SOLUTION INTRAVENOUS at 01:04

## 2025-04-15 RX ADMIN — METRONIDAZOLE 500 MG: 500 TABLET ORAL at 01:04

## 2025-04-15 NOTE — NURSING
IV discontinued with catheter tip intact. Medications delivered to bedside. Patient in room with mother waiting for wheelchair transport.

## 2025-04-15 NOTE — PROGRESS NOTES
Pharmacokinetic Assessment Follow Up: IV Vancomycin    Vancomycin serum concentration assessment(s):    The trough level was drawn correctly and can be used to guide therapy at this time. The measurement is above the desired definitive target range of 10 to 20 mcg/mL.    Vancomycin Regimen Plan:    Change regimen to Vancomycin 1250 mg IV every 12 hours with next serum trough concentration measured on 4/16 at 0500    Drug levels (last 3 results):  Recent Labs   Lab Result Units 04/13/25  0913 04/14/25  1747   Vancomycin Trough ug/ml 5.2* 22.7*       Pharmacy will continue to follow and monitor vancomycin.    Please contact pharmacy at extension [phone] for questions regarding this assessment.    Thank you for the consult,   Tash Wynn, PharmD       Patient brief summary:  Alessandro Moeller is a 19 y.o. female initiated on antimicrobial therapy with IV Vancomycin for treatment of intra-abdominal infection    The patient's regimen was 1000 mg q8h    Drug Allergies:   Review of patient's allergies indicates:  No Known Allergies    Actual Body Weight:   49.3 kg    Renal Function:   Estimated Creatinine Clearance: 98.9 mL/min (based on SCr of 0.7 mg/dL).,     Dialysis Method (if applicable):  N/A    CBC (last 72 hours):  Recent Labs   Lab Result Units 04/12/25  0308 04/13/25  0513 04/13/25  1321 04/14/25  0622   WBC K/uL 4.93 5.24 4.59 8.82   HGB gm/dL 7.2* 6.9* 7.3* 8.5*   HCT % 25.5* 24.2* 25.8* 28.5*   Platelet Count K/uL 367 328 379 337   Lymph % % 33.7 36.5 32.7 14.1*   Mono % % 7.7 7.6 5.9 7.5   Eos % % 2.2 0.2 0.0 0.1   Basophil % % 0.4 0.2 0.2 0.3       Metabolic Panel (last 72 hours):  Recent Labs   Lab Result Units 04/12/25  0308 04/13/25  0513 04/14/25  0622   Sodium mmol/L 140 139 138   Potassium mmol/L 3.4* 3.6 3.4*   Chloride mmol/L 108 109 110   CO2 mmol/L 25 26 21*   Glucose mg/dL 95 72 93   BUN mg/dL 13 7 9   Creatinine mg/dL 0.8 0.7 0.7   Albumin g/dL 3.1* 3.0* 2.8*   Bilirubin Total mg/dL 0.1 0.1 0.2    ALP unit/L 48 35* 34*   AST unit/L 17 15 22   ALT unit/L 8* 8* 13       Vancomycin Administrations:  vancomycin given in the last 96 hours                     vancomycin (VANCOCIN) 1,000 mg in 0.9% NaCl 250 mL IVPB (admixture device) (mg) 1,000 mg New Bag 04/14/25 1837     1,000 mg New Bag  1010    vancomycin (VANCOCIN) 1,000 mg in 0.9% NaCl 250 mL IVPB (admixture device) (mg) 1,000 mg New Bag 04/14/25 0157     1,000 mg New Bag 04/13/25 1726    vancomycin 750 mg in 0.9% NaCl 250 mL IVPB (admixture device) (mg) 750 mg New Bag 04/13/25 0957     750 mg New Bag 04/12/25 2152     750 mg New Bag  0926    vancomycin (VANCOCIN) 1,000 mg in 0.9% NaCl 250 mL IVPB (admixture device) (mg) 1,000 mg New Bag 04/11/25 2213                    Microbiologic Results:  Microbiology Results (last 7 days)       Procedure Component Value Units Date/Time    Culture, Anaerobic [[id number]]  (Abnormal) Collected: 04/10/25 0957    Order Status: Completed Specimen: Abscess from Vagina Updated: 04/14/25 1207     Anaerobe Culture Rare PREVOTELLA SPECIES    Fungus culture [[id number]]  (Normal) Collected: 04/11/25 1212    Order Status: Completed Specimen: Body Fluid from Pancreatic Fluid Updated: 04/14/25 1048     Fungal Culture Culture In Progress    Aerobic culture [[id number]] Collected: 04/11/25 1212    Order Status: Completed Specimen: Body Fluid from Pancreatic Fluid Updated: 04/14/25 0718     CULTURE, AEROBIC No Growth To Date    Aerobic culture (Specify Source) **CANNOT BE ORDERED AS STAT* [[id number]] Collected: 04/10/25 0957    Order Status: Completed Specimen: Abscess from Vagina Updated: 04/14/25 0633     CULTURE, AEROBIC No Growth    MRSA Screen by PCR [[id number]]  (Normal) Collected: 04/13/25 1128    Order Status: Completed Specimen: Nasal Swab Updated: 04/13/25 1842     MRSA PCR Screen Not Detected    Culture, Anaerobic [[id number]] Collected: 04/11/25 1212    Order Status: Completed Specimen: Body Fluid from Pancreatic Fluid  Updated: 04/13/25 1042     Anaerobe Culture Culture In Progress    Gram stain [[id number]] Collected: 04/11/25 1212    Order Status: Completed Specimen: Body Fluid from Pancreatic Fluid Updated: 04/12/25 0922     GRAM STAIN No WBCs, epithelial cells or organisms seen    Urine culture [[id number]] Collected: 04/10/25 0836    Order Status: Completed Specimen: Urine, Clean Catch Updated: 04/11/25 2054     Urine Culture No Growth    AFB Culture & Smear [[id number]]     Order Status: Canceled Specimen: Abscess from Abdomen     Culture, Anaerobe [[id number]]     Order Status: Canceled Specimen: Body Fluid from Abdomen     Aerobic culture [[id number]]     Order Status: Canceled Specimen: Abscess from Abdomen     Gram stain [[id number]]     Order Status: Canceled Specimen: Body Fluid from Abdomen     Influenza A & B by Molecular [[id number]]  (Normal) Collected: 04/10/25 0903    Order Status: Completed Specimen: Nasal Swab Updated: 04/10/25 0957     INFLUENZA A MOLECULAR Negative     INFLUENZA B MOLECULAR  Negative    Wet Prep, Genital (Vaginal Screen) [[id number]]  (Abnormal) Collected: 04/10/25 0903    Order Status: Completed Specimen: Genital from Cervicovaginal Updated: 04/10/25 0953     WBC Many     Bacteria Few     Clue Cells None     TRICHOMONAS  Few     BUDDING YEAST None     FUNGAL HYPHAE None    Aerobic culture (Specify Source) **CANNOT BE ORDERED AS STAT* [[id number]]     Order Status: Canceled Specimen: Abscess from Vagina

## 2025-04-15 NOTE — PROGRESS NOTES
VANCOMYCIN DOSING BY PHARMACY DISCONTINUATION NOTE    Alessandro Moeller is a 19 y.o. female who had been consulted for vancomycin dosing.    The pharmacy consult for vancomycin dosing has been discontinued.     Vancomycin Dosing by Pharmacy Consult will sign-off. Please reconsult if necessary. Thank you for allowing us to participate in this patient's care.       Marcela Bueno. PharmD  Ext [phone]

## 2025-04-15 NOTE — DISCHARGE INSTRUCTIONS
.Our goal at [facility] is to always give you outstanding care and exceptional service. You may receive a survey from [employer] by mail, text or e-mail in the next 24-48 hours asking about the care you received with us. The survey should only take 5-10 minutes to complete and is very important to us.     Your feedback provides us with a way to recognize our staff who work tirelessly to provide the best care! Also, your responses help us learn how to improve when your experience was below our aspiration of excellence. We are always looking for ways to improve your stay. We WILL use your feedback to continue making improvements to help us provide the highest quality care. We keep your personal information and feedback confidential. We appreciate your time completing this survey and can't wait to hear from you!!!    We look forward to your continued care with us! Thanks so much for choosing [facility] for your healthcare needs!

## 2025-04-15 NOTE — NURSING
"RN notified from desk that pt vomited. Once at bedside, pt appears calm and sleepy, easy to awaken. When asked if everything is okay, She says "I just want to go home." RN offered anti-emetic per MAR, pt refused. Vanco dose due, pt refused. RN asked if pt had any further needs, pt continued, "I just want to go home." Safety maintained, will cont to monitor.   "

## 2025-04-15 NOTE — NURSING
"Pt saying she just wants to go home, refusing IV antibiotics and care. RN encouraged patient to voice feelings and offer ideas that may motivate her to continue care. she says "I just need a break from sticking me all the time." Pt was tearful at times when speaking to her about care. She says "this is just too much, I have been here too long and just want to go home"     Pt refusing lab draws endorses tenderness in arms, keeps saying she just wants to go home.   Discussed the importance of care and asked what ways she would be willing to participate in antibiotic regimen, says she is willing to do pills. Pts girlfriend at the bedside. Pt calm and in no acute distress, safety maintained, will cont to monitor.   "

## 2025-04-15 NOTE — DISCHARGE SUMMARY
[facility] Hospital Medicine  Discharge Summary      Patient Name: Alessandro Moeller  MRN: [id number]  Admission Date: 4/10/2025  Hospital Length of Stay: 5 days  Discharge Date and Time: 04/15/2025 9:39 AM  Attending Physician: Ascencion Solomon MD   Discharging Provider: Ascencion Solomon MD  Discharge Provider Team: [facility]  Primary Care Provider: Ankita Primary Doctor        HPI: 19F with past medical history of recurrent Bartholin cysts, microcytic anemia, Postural Proteinuria presenting with a 3 day history of abdominal pain.  She underwent drainage for recurrent Bartholin gland abscess on 4/3/2025, provided with Rocephin and Doxycycline, and was started on Keflex for Group b Strep urinary tract infection at same time.  She presents with a 3 day history of poorly localized abdominal pain, that is occasionally dull and occasionally sharp in nature,  is accompanied by subjective fever, chills, an episode of emesis.  She denies continued dysuria after recent UTI. She denies diarrhea, trauma to region, and sick contacts with similar symptoms. Ct abdomen Pelvis obtained in ED and findings include labial fluid and air collection, right lower quadrant fluid and air collection, and pre-sacral complex fluid and air collection.     In ED, patient is afebrile, without leukocytosis, and hemodynamically stable. General surgery was consulted and deem no surgical intervention.  Ob/Gyn consulted and performed drainage of labial abscess and recommend MRI Pelvis to assess for occult tubo-ovarian abscess.     She was given a liter of IVF, provided with morphine for pain control, given Flagyl (vaginal wet prep positive for trichomonas as well as bacteria), and will be admitted to Hospital Medicine for further assessment, treatment, as well as consideration of Interventional Radiology for drainage and sampling of fluid collection.          Hospital Course: Interventional Radiology performed sample collection of fluid and  gram stain negative with no concerning findings.  General Surgery consulted without need for intervention, and Gynecology drained labial cyst.  Only finding was Trichomonas during testing for which Flagyl was initiated.  Although patient was afebrile and non-toxic appearing without white count elevation, broad spectrum antibiotics were initiated until results from collection obtained.  During hospital course, there was dramatic improvement in abdominal pain and distension.  Repeat CT abd/pelvis was obtained and revealed no abscess but some continued fluid collection. She did require blood transfusion given baseline low hemoglobin (found to be iron deficient and started on treatment) complicated by baseline episode of menorrhagia.  Given dramatic improvement in symptoms and no ongoing concerning findings clinically, patient with desire to return home with very strict return precautions.  She is to complete course of Doxycycline as well as Flagyl for trichomonas infection.  Gynecology additionally very helpful in ensuring close follow up.  She is to be discharged on aforementioned regimen, follow up with Gynecology, and referral to Hepatology for evaluation of hemangioma/liver lesions found on imaging.    Please see Dr. Solomon's note from 4/14 for treatment per problem      Discharge Physical Exam  Physical Exam  Constitutional:       General: She is not in acute distress.  HENT:      Head: Normocephalic and atraumatic.   Eyes:      Extraocular Movements: Extraocular movements intact.   Cardiovascular:      Rate and Rhythm: Normal rate and regular rhythm.   Pulmonary:      Effort: Pulmonary effort is normal. No respiratory distress.   Abdominal:      General: Abdomen is flat. There is no distension.      Tenderness: There is no abdominal tenderness. There is no right CVA tenderness, left CVA tenderness, guarding or rebound.   Musculoskeletal:      Right lower leg: No edema.      Left lower leg: No edema.   Skin:      Coloration: Skin is not pale.   Neurological:      General: No focal deficit present.      Mental Status: She is alert and oriented to person, place, and time.   Psychiatric:         Mood and Affect: Mood normal.         Behavior: Behavior normal.          Consults:   Consults (From admission, onward)          Status Ordering Provider     Inpatient consult to Registered Dietitian/Nutritionist  Once        Provider:  (Not yet assigned)    Completed TRINA MELO     Inpatient consult to Interventional Radiology  Once        Provider:  (Not yet assigned)    Completed HAYDEE DOWNS     Inpatient consult to Gynecology  Once        Provider:  (Not yet assigned)    Completed HAYDEE DOWNS     Inpatient consult to General Surgery  Once        Provider:  (Not yet assigned)    Completed KEATON LUKE            Final Active Diagnoses:    Diagnosis Date Noted POA    PRINCIPAL PROBLEM:  Intraabdominal fluid collection [R18.8] 04/10/2025 Yes    Labial abscess [N76.4] 04/10/2025 Yes    Trichomonal vaginitis [A59.01] 04/10/2025 Yes    Microcytic anemia [D50.9] 04/10/2025 Yes    BMI < 18.5 [Z68.1] 04/11/2025 Not Applicable    Moderate protein-calorie malnutrition [E44.0] 04/12/2025 Yes      Problems Resolved During this Admission:    Diagnosis Date Noted Date Resolved POA    Abdominal pain [R10.9] 08/06/2021 04/15/2025 Yes    Hypokalemia [E87.6] 04/14/2025 04/15/2025 Unknown      Discharged Condition: fair    Disposition: Home or Self Care    Follow Up:   Follow-up Information       Gynecology Follow up.               Hepatology Follow up.                           Patient Instructions:      Ambulatory referral/consult to Hepatology   Referral Priority: Routine Referral Type: Consultation   Referral Reason: Specialty Services Required   Requested Specialty: Hepatology   Number of Visits Requested: 1     Diet Adult Regular     Notify your health care provider if you experience any of the following:  temperature >100.4      Notify your health care provider if you experience any of the following:  persistent nausea and vomiting or diarrhea     Notify your health care provider if you experience any of the following:  severe uncontrolled pain     Activity as tolerated     Medications:  Reconciled Home Medications:      Medication List        START taking these medications      ferrous gluconate 324 mg (37.5 mg iron) Tab tablet  Take 1 tablet (324 mg total) by mouth daily with breakfast.  Start taking on: April 16, 2025     metroNIDAZOLE 500 MG tablet  Commonly known as: FLAGYL  Take 1 tablet (500 mg total) by mouth every 12 (twelve) hours. Do not drink alcohol while on this medication for 6 days            CONTINUE taking these medications      acetaminophen 500 MG tablet  Commonly known as: TYLENOL  Take 1 tablet (500 mg total) by mouth every 6 (six) hours as needed for Pain.     doxycycline 100 MG Cap  Commonly known as: VIBRAMYCIN  Take 1 capsule (100 mg total) by mouth 2 (two) times daily. for 9 days     ibuprofen 600 MG tablet  Commonly known as: ADVIL,MOTRIN  Take 1 tablet (600 mg total) by mouth every 6 (six) hours as needed for Pain (Take with food as needed for mild-to-moderate pain).     oxyCODONE-acetaminophen 5-325 mg per tablet  Commonly known as: PERCOCET  Take 1 tablet by mouth every 4 (four) hours as needed for Pain (As needed for severe 10/10 pain).     polyethylene glycol 17 gram/dose powder  Commonly known as: GLYCOLAX  Take 17 g by mouth once daily. Take daily to prevent constipation            STOP taking these medications      cephALEXin 500 MG capsule  Commonly known as: KEFLEX                  Pending Diagnostic Studies:       Procedure Component Value Units Date/Time    CBC auto differential [[id number]]     Order Status: Sent Lab Status: No result     Specimen: Blood     Narrative:      The following orders were created for panel order CBC auto differential.  Procedure                                Abnormality         Status                     ---------                               -----------         ------                     CBC with Differential[[id number]]                                                        Please view results for these tests on the individual orders.    CBC with Differential [[id number]]     Order Status: Sent Lab Status: No result     Specimen: Blood     Comprehensive metabolic panel [[id number]]     Order Status: Sent Lab Status: No result     Specimen: Blood     IR Abscess Aspiration(XPD) [[id number]] Resulted: 04/11/25 1221    Order Status: Sent Lab Status: In process Updated: 04/11/25 1237          Indwelling Lines/Drains at time of discharge:   Lines/Drains/Airways       None                   Time spent on the discharge of patient: 45 minutes         Ascencion Solomon MD  Department of Hospital Medicine  [facility]

## 2025-04-15 NOTE — PLAN OF CARE
[facility]  Discharge Final Note    Primary Care Provider: No, Primary Doctor    Expected Discharge Date: 4/15/2025        Patient dc home with family and no needs from case management.     Discharge Plan A and Plan B have been determined by review of patient's clinical status, future medical and therapeutic needs, and coverage/benefits for post-acute care in coordination with multidisciplinary team members.    Future Appointments   Date Time Provider Department Center   4/24/2025  3:00 PM GYN RESIDENT, [facility] [place]   4/25/2025 10:00 AM Rosalina Moeller PA-C [facility]   4/28/2025  9:20 AM Trista Barron PA-C [facility] [place] [facility]       Final Discharge Note (most recent)       Final Note - 04/15/25 1421          Final Note    Assessment Type Final Discharge Note (P)      Anticipated Discharge Disposition Home or Self Care (P)      What phone number can be called within the next 1-3 days to see how you are doing after discharge? [phone] (P)      Hospital Resources/Appts/Education Provided Provided patient/caregiver with written discharge plan information (P)         Post-Acute Status    Post-Acute Authorization Other (P)      [facility] (P)      Other Status No Post-Acute Service Needs (P)      Discharge Delays None known at this time (P)                      Important Message from Medicare             Contact Info       Gynecology        Next Steps: Follow up    Hepatology        Next Steps: Follow up          DIANA Martin  Case Management  [phone]

## 2025-04-16 ENCOUNTER — PATIENT OUTREACH (OUTPATIENT)
Dept: ADMINISTRATIVE | Facility: CLINIC | Age: 20
End: 2025-04-16
Payer: COMMERCIAL

## 2025-04-16 NOTE — PROGRESS NOTES
C3 nurse attempted to contact Alessandro Moeller  for a TCC post hospital discharge follow up call. ([facility] applicable response No answer. No voicemail available, LVM). The patient has a scheduled HOSFU appointment with Rosalina Moeller PA-C on 4/25/2025 @ 10 AM.

## 2025-04-16 NOTE — PROGRESS NOTES
C3 nurse spoke with Alessandro Moeller  for a TCC post hospital discharge follow up call. The patient has a scheduled [facility] appointment with Rosalina Moeller PA-C on 4/25/2025 @ 10 AM.

## 2025-04-21 LAB — BACTERIA SPEC ANAEROBE CULT: NORMAL

## 2025-04-23 NOTE — CARE UPDATE
04/23/2025      Alessandro Moeller  [address] [facility] [address] [facility] [address]          [facility] Medicine Dept.  [facility] [address] [address] [phone] after hours  [phone] fax Alessandro Moeller has been hospitalized at the [facility] since 4/10/2025.  Please excuse the patient from duties.  Patient may return on 4/17/25.  No restrictions.     Please contact me if you have any questions.                  __________________________  Alicja Chavarria PA-C  04/23/2025

## 2025-04-24 ENCOUNTER — OFFICE VISIT (OUTPATIENT)
Dept: OBSTETRICS AND GYNECOLOGY | Facility: CLINIC | Age: 20
End: 2025-04-24
Payer: COMMERCIAL

## 2025-04-24 VITALS
WEIGHT: 103.63 LBS | DIASTOLIC BLOOD PRESSURE: 70 MMHG | BODY MASS INDEX: 16.27 KG/M2 | HEIGHT: 67 IN | SYSTOLIC BLOOD PRESSURE: 110 MMHG

## 2025-04-24 DIAGNOSIS — N75.1 BARTHOLIN'S GLAND ABSCESS: Primary | ICD-10-CM

## 2025-04-24 PROCEDURE — 3008F BODY MASS INDEX DOCD: CPT | Mod: CPTII,S$GLB,,

## 2025-04-24 PROCEDURE — 99999 PR PBB SHADOW E&M-EST. PATIENT-LVL III: CPT | Mod: PBBFAC,,,

## 2025-04-24 PROCEDURE — 3074F SYST BP LT 130 MM HG: CPT | Mod: CPTII,S$GLB,,

## 2025-04-24 PROCEDURE — 99213 OFFICE O/P EST LOW 20 MIN: CPT | Mod: S$GLB,,,

## 2025-04-24 PROCEDURE — 1111F DSCHRG MED/CURRENT MED MERGE: CPT | Mod: CPTII,S$GLB,,

## 2025-04-24 PROCEDURE — 3078F DIAST BP <80 MM HG: CPT | Mod: CPTII,S$GLB,,

## 2025-04-24 PROCEDURE — 1159F MED LIST DOCD IN RCRD: CPT | Mod: CPTII,S$GLB,,

## 2025-04-24 NOTE — PROGRESS NOTES
"History & Physical  [facility] Gynecology      SUBJECTIVE:     Chief Complaint: No chief complaint on file.       History of Present Illness:  Alessandro Moeller is a 19 y.o.  presents for follow-up with bartholin gland and pelvic fluid collection. Patient was seen on  in ED for left bartholin's. Word catheter removed at that time and wound was packed. She originally presented to ED for abdominal pain and CT at that time showed intraabdominal fluid collections. Fluid collection drained with IR and cultures are NGTD.     Pt is doing well since hospitalization. No pelvic, abdominal, or labial pain. Reports cyst has resolved. No bowel or bladder problems. No fever. She had a pap smear on 2025 which was negative.     Gyn Hx: Pap smear  wnl. HPV vaccinated. Declines birth control.     Fhx: ovarian history (paternal grandmother)    Active medications, medical history, surgical history, family history, social history, and allergies all reviewed and updated in chart.  OB History          0    Para   0    Term   0       0    AB   0    Living   0         SAB   0    IAB   0    Ectopic   0    Multiple   0    Live Births   0                 Review of Systems   Constitutional:  Negative for chills and fever.   HENT:  Negative for nasal congestion.    Eyes:  Negative for visual disturbance.   Respiratory:  Negative for shortness of breath.    Cardiovascular:  Negative for chest pain.   Gastrointestinal:  Negative for abdominal pain, nausea and vomiting.   Genitourinary:  Negative for dysuria, pelvic pain and vaginal bleeding.   Musculoskeletal:  Negative for back pain.   Integumentary:  Negative for rash.   Neurological:  Negative for syncope.   Psychiatric/Behavioral:  Negative for depression.          OBJECTIVE:   /70   Ht 5' 7" (1.702 m)   Wt 47 kg (103 lb 9.9 oz)   LMP 2025    Physical Exam  Constitutional:       General: She is awake. She is not in acute distress.     " Appearance: Normal appearance. She is well-developed.     Genitourinary:    Vulva exam comments: Induration still present on left inferior labia minora. No fluctuance noted. Prior drainage site closed and well healing. No pain to palpation. .           HENT:      Head: Normocephalic.   Neurological:      Mental Status: She is alert and oriented to person, place, and time.   Skin:     General: Skin is warm.   Psychiatric:         Attention and Perception: Attention normal.         Mood and Affect: Mood normal.         Speech: Speech normal.         Behavior: Behavior is cooperative.          ASSESSMENT:   The encounter diagnosis was Bartholin's gland abscess.      Plan:   Diagnoses and all orders for this visit:    Bartholin's gland abscess  - Patient denies pain, swelling   - Mild induration present on left labia  - Discussed sitz baths for symptomatic treatment.   - RTC ANKITA Piedra MD PGY-3  Obstetrics and Gynecology  [facility]

## 2025-04-24 NOTE — MEDICAL/APP STUDENT
Subjective     Patient ID: Alessandro Moeller is a 19 y.o. female.    Chief Complaint: Abscess (Drained vaginal abscess last thur, now vomiting, and body aches sometimes urinating blood)  Pt is doing well post-procedure. No pain. No abnormal drainage/discharge. No bowel or bladder problems. No fever. She had a pap smear on 2025 which was negative.     Abscess  Associated Symptoms: no fever, no chills    Past Medical History:   Diagnosis Date    Proteinuria, postural     benign      Past Surgical History:   Procedure Laterality Date    COLONOSCOPY N/A 2021    Procedure: COLONOSCOPY;  Surgeon: Constantino Shea MD;  Location: [facility] (2ND FLR);  Service: Endoscopy;  Laterality: N/A;    ESOPHAGOGASTRODUODENOSCOPY N/A 2021    Procedure: (EGD);  Surgeon: Constantino Shea MD;  Location: [facility] (2ND FLR);  Service: Endoscopy;  Laterality: N/A;  covid test 8/3 [facility]      Family History   Problem Relation Name Age of Onset    Sleep apnea Mother      Hypertension Mother      Mitral valve prolapse Mother      Alcohol abuse Mother      Drug abuse Mother      Alcohol abuse Father      Drug abuse Father      Mental illness Father      Congenital heart disease Sister      Learning disabilities Brother      Diabetes Maternal Grandmother      Ovarian cancer Maternal Grandmother      Hypertension Maternal Grandmother      Hyperlipidemia Maternal Grandmother      Obesity Paternal Aunt      Diabetes Paternal Aunt      Obesity Paternal Uncle      Hypertension Paternal Uncle      Diabetes Paternal Uncle      Hyperlipidemia Paternal Uncle      Diabetes Paternal Grandmother      Hyperlipidemia Paternal Grandmother      Hypertension Paternal Grandmother      Cancer Paternal Grandmother  62        ovarian    Melanoma Neg Hx      Psoriasis Neg Hx      Lupus Neg Hx        OB History    Para Term  AB Living   0 0 0 0 0 0   SAB IAB Ectopic Multiple Live Births   0 0 0 0 0        Review of Systems    Constitutional:  Positive for appetite change. Negative for chills, fatigue and fever.   Genitourinary:  Negative for pelvic pain, vaginal bleeding and vaginal discharge.   Integumentary:  Negative for breast mass, breast discharge and breast tenderness.   Breast: Negative for mass and tenderness    GENERAL: No fever, chills, fatigability or weight loss.  VULVAR: No pain, no lesions and no itching.  VAGINAL: No relaxation, no itching, no discharge, no abnormal bleeding and no lesions.  ABDOMEN: No abdominal pain. Denies nausea. Denies vomiting. No diarrhea. No constipation  BREAST: Denies pain. No lumps. No discharge.  URINARY: No incontinence, no nocturia, no frequency and no dysuria.  CARDIOVASCULAR: No chest pain. No shortness of breath. No leg cramps.  NEUROLOGICAL: No headaches. No vision changes.     Objective     Physical Exam  GEN:  NAD, A&O x 3  PELVIC: Healing bartholin abscess noted, possible induration post-procedure.  Normal hair distribution.  Adequate perineal body, normal urethral meatus.  Vagina moist and well rugated without lesions or discharge.  Cuff intact and healing well.  Cervix and uterus absent.  Pelvis without masses or tenderness.       Assessment and Plan     1. PID (acute pelvic inflammatory disease)  -     Culture, Anaerobic; Standing  -     Fungus culture; Standing  -     Gram stain; Standing  -     Aerobic culture; Standing  -     WBC & Diff,Body Fluid; Standing  -     Activity as tolerated  -     Notify your health care provider if you experience any of the following:  temperature >100.4  -     Notify your health care provider if you experience any of the following:  persistent nausea and vomiting or diarrhea  -     Notify your health care provider if you experience any of the following:  severe uncontrolled pain  -     Diet Adult Regular    2. Bartholin's gland abscess  -     INCISION AND DRAINAGE; Standing  -     Culture, Anaerobic; Standing  -     Fungus culture; Standing  -      Gram stain; Standing  -     Aerobic culture; Standing  -     WBC & Diff,Body Fluid; Standing    3. Trichomoniasis  -     Culture, Anaerobic; Standing  -     Fungus culture; Standing  -     Gram stain; Standing  -     Aerobic culture; Standing  -     WBC & Diff,Body Fluid; Standing    4. Pelvic abscess in female  -     Culture, Anaerobic; Standing  -     Fungus culture; Standing  -     Gram stain; Standing  -     Aerobic culture; Standing  -     WBC & Diff,Body Fluid; Standing    5. Intra-abdominal abscess  -     Culture, Anaerobic; Standing  -     Fungus culture; Standing  -     Gram stain; Standing  -     Aerobic culture; Standing  -     WBC & Diff,Body Fluid; Standing    6. Intra-abdominal fluid collection  -     Culture, Anaerobic; Standing  -     Fungus culture; Standing  -     Gram stain; Standing  -     Aerobic culture; Standing  -     WBC & Diff,Body Fluid; Standing  -     EKG 12-lead; Standing    7. Chest pain  -     Cancel: EKG 12-lead; Standing  -     Culture, Anaerobic; Standing  -     Fungus culture; Standing  -     Gram stain; Standing  -     Aerobic culture; Standing  -     WBC & Diff,Body Fluid; Standing    8. Intraabdominal fluid collection  -     Culture, Anaerobic; Standing  -     Fungus culture; Standing  -     Gram stain; Standing  -     Aerobic culture; Standing  -     WBC & Diff,Body Fluid; Standing    9. Hemangioma, unspecified site  -     Ambulatory referral/consult to Hepatology    Other orders  -     POCT urine pregnancy; Standing  -     Urinalysis, Reflex to Urine Culture; Standing  -     C. trachomatis/N. gonorrhoeae by AMP DNA [facility]; Vagina; Standing  -     Wet Prep, Genital (Vaginal Screen); Standing  -     COVID-19 Rapid Screening; Standing  -     Influenza A & B by Molecular; Standing  -     Insert Saline lock IV; Standing  -     Cancel: Vital Signs; Standing  -     CBC auto differential; Standing  -     Comprehensive metabolic panel; Standing  -     Lactic acid, plasma;  Standing  -     sodium chloride 0.9% bolus 1,000 mL 1,000 mL  -     Cancel: Diet NPO; Standing  -     ondansetron injection 4 mg  -     ketorolac injection 9.999 mg  -     morphine injection 4 mg; Refill: 0  -     LETS (LIDOcaine-TETRAcaine-EPINEPHrine) gel solution  -     CT Abdomen Pelvis With IV Contrast NO Oral Contrast; Standing  -     Urinalysis Microscopic; Standing  -     Cancel: ABSCESS ASPIRATION; Standing  -     INCISION AND DRAINAGE; Standing  -     Cancel: Aerobic culture (Specify Source) **CANNOT BE ORDERED AS STAT*; Standing  -     Culture, Anaerobic; Standing  -     Aerobic culture (Specify Source) **CANNOT BE ORDERED AS STAT*; Standing  -     cefTRIAXone injection 1 g  -     metronidazole IVPB 500 mg  -     HIV 1/2 Ag/Ab (4th Gen); Standing  -     Treponema Pallidium Antibodies IgG, IgM; Standing  -     Hepatitis C Antibody; Standing  -     Urine culture; Standing  -     iohexoL (OMNIPAQUE 350) injection 75 mL  -     ketorolac injection 9.999 mg  -     ondansetron injection 4 mg  -     morphine injection 4 mg; Refill: 0  -     Cancel: US Pelvis Comp with Transvag NON-OB (xpd); Standing  -     Cancel: Nursing communication; Standing  -     lactated ringers bolus 1,000 mL  -     US Pelvis Complete Non OB; Standing  -     Inpatient consult to General Surgery; Standing  -     MRI Pelvis W WO Contrast; Standing  -     ketorolac injection 9.999 mg  -     ondansetron injection 4 mg  -     morphine injection 4 mg; Refill: 0  -     Inpatient consult to Gynecology; Standing  -     E-Consult to Specialist; Standing  -     LIDOcaine HCL 10 mg/ml (1%) injection 15 mL  -     LETS (LIDOcaine-TETRAcaine-EPINEPHrine) gel solution  -     Admit to Inpatient; Standing  -     Discontinue: acetaminophen tablet 500 mg  -     ondansetron injection 4 mg  -     Discontinue: morphine injection 4 mg; Refill: 0  -     LIDOcaine HCL 10 mg/ml (1%) injection 15 mL  -     Cancel: Vital signs; Standing  -     Cancel: Progressive  Mobility Protocol (mobilize patient to their highest level of functioning at least twice daily); Standing  -     Cancel: Bladder scan; Standing  -     Cancel: Notify Provider; Standing  -     Discontinue: sodium chloride 0.9% flush 10 mL  -     Saline lock IV; Standing  -     Cancel: Place sequential compression device; Standing  -     Discontinue: naloxone 0.4 mg/mL injection 0.02 mg  -     Discontinue: glucose chewable tablet 16 g  -     Discontinue: glucose chewable tablet 24 g  -     Discontinue: dextrose 50% injection 12.5 g  -     Discontinue: dextrose 50% injection 25 g  -     Discontinue: glucagon (human recombinant) injection 1 mg  -     Cancel: Recheck Blood Glucose:; Standing  -     SMOKING CESSATION EDUCATION PER RESPIRATORY Once; Standing  -     Cancel: Full code; Standing  -     Cancel: Reason for No Pharmacological VTE Prophylaxis; Standing  -     Discontinue: HYDROcodone-acetaminophen 5-325 mg per tablet 1 tablet; Refill: 0  -     Discontinue: HYDROmorphone (PF) injection 1 mg; Refill: 0  -     Discontinue: ondansetron injection 4 mg  -     Comprehensive Metabolic Panel (CMP); Standing  -     CBC with Automated Differential; Standing  -     Discontinue: doxycycline tablet 100 mg  -     Discontinue: metronidazole IVPB 500 mg  -     Admit to Inpatient; Standing  -     Inpatient consult to Interventional Radiology; Standing  -     Iron and TIBC; Standing  -     Transferrin; Standing  -     Ferritin; Standing  -     Vitamin B12; Standing  -     gadobutroL (GADAVIST) injection 5 mL  -     Cancel: Diet Adult Regular; Standing  -     Cancel: Diet NPO; Standing  -     Inpatient consult to Registered Dietitian/Nutritionist; Standing  -     Cancel: Platelet Review; Standing  -     CBC auto differential; Standing  -     Platelet Review; Standing  -     Platelet Review; Standing  -     IR Abscess Aspiration(XPD); Standing  -     Protime-INR; Standing  -     midazolam injection  -     fentaNYL 50 mcg/mL  injection  -     Cancel: WBC & Diff,Body Fluid Abdominal Fluid; Standing  -     Cancel: Culture, Anaerobe; Standing  -     Cancel: Aerobic culture; Standing  -     Cancel: Albumin, Body Fluid (Reference Lab or Non[facility]) [facility]; Abdominal Fluid; Standing  -     Cancel: LDH, Peritoneal, Pleural Fluid or ALEJANDRO Drainage, In-House Ascites (Abdominal fluid); Standing  -     Cancel: Glucose, Peritoneal, Pleural Fluid or ALEJANDRO Drainage, In-House Ascites (Abdominal fluid); Standing  -     Cancel: Cytology, Fluid/Wash/Brush; Standing  -     Cancel: Gram stain; Standing  -     HYDROmorphone (PF) (DILAUDID) 2 mg/mL injection  -     Cancel: AFB Culture & Smear; Standing  -     Cancel: Diet Adult Regular; Standing  -     CBC auto differential; Standing  -     Cancel: Pharmacy to dose Vancomycin consult; Standing  -     Discontinue: vancomycin - pharmacy to dose  -     Discontinue: piperacillin-tazobactam (ZOSYN) 4.5 g in D5W 100 mL IVPB (MB+)  -     Discontinue: ferrous gluconate tablet 324 mg  -     Discontinue: docusate sodium capsule 50 mg  -     Cancel: Type & Screen; Standing  -     Discontinue: vancomycin (VANCOCIN) 1,000 mg in 0.9% NaCl 250 mL IVPB (admixture device)  -     vancomycin (VANCOCIN) 1,000 mg in 0.9% NaCl 250 mL IVPB (admixture device)  -     Discontinue: vancomycin 750 mg in 0.9% NaCl 250 mL IVPB (admixture device)  -     VANCOMYCIN, TROUGH; Standing  -     Discontinue: oxyCODONE-acetaminophen 7.5-325 mg per tablet 1 tablet; Refill: 0  -     diphenhydrAMINE capsule 25 mg  -     Cancel: Dietary nutrition supplements Boost Plus Nutritional Drink - Very Vanilla; Standing  -     Type & Screen; Standing  -     Prepare RBC 1 Unit; Standing  -     Transfuse RBC 1 Unit; Standing  -     Discontinue: 0.9%  NaCl infusion (for blood administration)  -     Cancel: CBC auto differential; Standing  -     Cancel: Comprehensive metabolic panel; Standing  -     CBC auto differential; Standing  -     ABORH RETYPE; Standing  -      Discontinue: vancomycin (VANCOCIN) 1,000 mg in 0.9% NaCl 250 mL IVPB (admixture device)  -     VANCOMYCIN, TROUGH; Standing  -     MRSA Screen by PCR; Standing  -     Discontinue: HYDROmorphone (PF) injection 0.5 mg  -     Discontinue: metroNIDAZOLE tablet 500 mg  -     CT Abdomen Pelvis With IV Contrast Routine Oral Contrast; Standing  -     potassium chloride SA CR tablet 40 mEq  -     Cancel: Pharmacy to dose Vancomycin consult; Standing  -     Discontinue: vancomycin - pharmacy to dose  -     Discontinue: vancomycin (VANCOCIN) 1,000 mg in 0.9% NaCl 250 mL IVPB (admixture device)  -     iohexol (Omnipaque 350) oral solution 15 mL  -     promethazine (PHENERGAN) 12.5 mg in 0.9% NaCl 50 mL IVPB  -     potassium chloride SA CR tablet 40 mEq  -     iohexoL (OMNIPAQUE 350) injection 75 mL  -     Discontinue: vancomycin 1,250 mg in 0.9% NaCl 250 mL IVPB (admixture device)  -     Cancel: VANCOMYCIN, TROUGH; Standing  -     doxycycline (VIBRAMYCIN) 100 MG Cap; Take 1 capsule (100 mg total) by mouth 2 (two) times daily. for 9 days (Patient not taking: Reported on 2025)  Dispense: 18 capsule; Refill: 0  -     ferrous gluconate 324 mg (37.5 mg iron) Tab tablet; Take 1 tablet (324 mg total) by mouth daily with breakfast.  Dispense: 30 tablet; Refill: 11  -     metroNIDAZOLE (FLAGYL) 500 MG tablet; Take 1 tablet (500 mg total) by mouth every 12 (twelve) hours. Do not drink alcohol while on this medication for 6 days  Dispense: 12 tablet; Refill: 0  -     DISCHARGE PATIENT; Standing  -     Cancel: Discontinue IV; Standing  -     Discontinue Telemetry - Prior to Discharge; Standing      .pmh  19 y.o. female  presented for ED followup for drained vaginal abscess. Pt is doing well.          No follow-ups on file.

## 2025-04-25 ENCOUNTER — TELEPHONE (OUTPATIENT)
Dept: HEPATOLOGY | Facility: CLINIC | Age: 20
End: 2025-04-25
Payer: COMMERCIAL

## 2025-04-25 DIAGNOSIS — K76.9 LIVER LESION: ICD-10-CM

## 2025-04-25 DIAGNOSIS — K76.9 LIVER DISEASE, UNSPECIFIED: Primary | ICD-10-CM

## 2025-04-25 NOTE — TELEPHONE ENCOUNTER
----- Message from Med Assistant Martinez sent at 4/25/2025  9:49 AM CDT -----  Regarding: FW: PT req seen sooner/speak to nurse regarding pain  Contact: Patient can be contacted @# [phone]    ----- Message -----  From: Courtney De La Torre  Sent: 4/25/2025   9:47 AM CDT  To: Kishore Arnett Staff  Subject: PT req seen sooner/speak to nurse regarding #    PT called regarding called from Mrs. Romano. PT states she would like to be seen sooner as she is having pain on and off. Appt added to wait list.  Please call to advise regarding painPatient can be contacted @# [phone]

## 2025-05-06 NOTE — PROGRESS NOTES
I have reviewed the notes, assessments, and/or procedures performed by Dr. Piedra, I concur with her/his documentation of Alessandro Moeller.  Date of Service: 4/24/2025

## 2025-05-19 ENCOUNTER — HOSPITAL ENCOUNTER (OUTPATIENT)
Dept: RADIOLOGY | Facility: HOSPITAL | Age: 20
Discharge: HOME | End: 2025-05-19
Attending: INTERNAL MEDICINE
Payer: COMMERCIAL

## 2025-05-19 DIAGNOSIS — K76.9 LIVER LESION: ICD-10-CM

## 2025-05-19 PROCEDURE — A9585 GADOBUTROL INJECTION: HCPCS | Performed by: INTERNAL MEDICINE

## 2025-05-19 PROCEDURE — 74183 MRI ABD W/O CNTR FLWD CNTR: CPT | Mod: 26,,, | Performed by: RADIOLOGY

## 2025-05-19 PROCEDURE — 25500020 PHARM REV CODE 255: Performed by: INTERNAL MEDICINE

## 2025-05-19 PROCEDURE — 74183 MRI ABD W/O CNTR FLWD CNTR: CPT | Mod: TC

## 2025-05-19 RX ORDER — GADOBUTROL 604.72 MG/ML
10 INJECTION INTRAVENOUS
Status: COMPLETED | OUTPATIENT
Start: 2025-05-19 | End: 2025-05-19

## 2025-05-19 RX ADMIN — GADOBUTROL 10 ML: 604.72 INJECTION INTRAVENOUS at 08:05

## 2025-05-23 ENCOUNTER — TELEPHONE (OUTPATIENT)
Dept: HEPATOLOGY | Facility: CLINIC | Age: 20
End: 2025-05-23
Payer: COMMERCIAL

## 2025-05-23 ENCOUNTER — RESULTS FOLLOW-UP (OUTPATIENT)
Dept: TRANSPLANT | Facility: CLINIC | Age: 20
End: 2025-05-23

## 2025-05-23 DIAGNOSIS — R16.0 LIVER MASSES: Primary | ICD-10-CM

## 2025-05-23 NOTE — TELEPHONE ENCOUNTER
"Patient: Alessandro Moeller       MRN: [id number]      : 2005     Age: 19 y.o.  [address] [facility] [address]    Presenting Radiologists:     Providers: Hope Novak MD    Priority of review: Benign disease    Patient Transplant Status: Not a candidate    Reason for presentation: Indeterminate lesion    Clinical Summary: 18 y/o female with multiple enhancing liver lesions, findings indeterminate, hemangioma vs adenomas, plus cysts.  Can get [facility] MRI in 6 months, but would like review now.    AFP, CA 19-9 pending at this moment.    Imaging to be reviewed: MRI abd 25, CT abd 25    HCC Treatment History: none    Platelets:   Lab Results   Component Value Date/Time     04/15/2025 10:41 AM     2024 09:00 AM     Creatinine:   Lab Results   Component Value Date/Time    CREATININE 0.9 04/15/2025 10:41 AM     Bilirubin:   Lab Results   Component Value Date/Time    BILITOT 0.3 04/15/2025 10:41 AM    BILITOT 0.3 2024 09:00 AM     AFP Last 3 each if available: No results found for: "AFP", "EXTAFP"    MELD: MELD 3.0: 8 at 2025  5:13 AM  MELD-Na: 6 at 2025  5:13 AM  Calculated from:  Serum Creatinine: 0.7 mg/dL (Using min of 1 mg/dL) at 2025  5:13 AM  Serum Sodium: 139 mmol/L (Using max of 137 mmol/L) at 2025  5:13 AM  Total Bilirubin: 0.1 mg/dL (Using min of 1 mg/dL) at 2025  5:13 AM  Serum Albumin: 3 g/dL at 2025  5:13 AM  INR(ratio): 1 at 2025 10:39 AM  Age at listing (hypothetical): 19 years  Sex: Female at 2025  5:13 AM      Plan:     Follow-up Provider:   "

## 2025-05-26 ENCOUNTER — CONFERENCE (OUTPATIENT)
Dept: TRANSPLANT | Facility: CLINIC | Age: 20
End: 2025-05-26
Payer: COMMERCIAL

## 2025-05-26 ENCOUNTER — LAB VISIT (OUTPATIENT)
Dept: LAB | Facility: HOSPITAL | Age: 20
End: 2025-05-26
Attending: INTERNAL MEDICINE
Payer: COMMERCIAL

## 2025-05-26 DIAGNOSIS — R16.0 LIVER MASSES: ICD-10-CM

## 2025-05-26 LAB
AFP SERPL-MCNC: <2 NG/ML
CANCER AG19-9 SERPL-ACNC: 20.6 U/ML

## 2025-05-26 PROCEDURE — 82105 ALPHA-FETOPROTEIN SERUM: CPT

## 2025-05-26 PROCEDURE — 36415 COLL VENOUS BLD VENIPUNCTURE: CPT

## 2025-05-26 PROCEDURE — 86301 IMMUNOASSAY TUMOR CA 19-9: CPT

## 2025-05-26 NOTE — TELEPHONE ENCOUNTER
"Patient: Alessandro Moeller       MRN: [id number]      : 2005     Age: 19 y.o.  [address] [facility] [address]    Presenting Radiologists:     Providers: Hope Novak MD    Priority of review: Benign disease    Patient Transplant Status: Not a candidate    Reason for presentation: Indeterminate lesion    Clinical Summary: 20 y/o female with multiple enhancing liver lesions, findings indeterminate, hemangioma vs adenomas, plus cysts.  Can get [facility] MRI in 6 months, but would like review now.    AFP, CA 19-9 pending at this moment.    Imaging to be reviewed: MRI abd 25, CT abd 25    HCC Treatment History: none    Platelets:   Lab Results   Component Value Date/Time     04/15/2025 10:41 AM     2024 09:00 AM     Creatinine:   Lab Results   Component Value Date/Time    CREATININE 0.9 04/15/2025 10:41 AM     Bilirubin:   Lab Results   Component Value Date/Time    BILITOT 0.3 04/15/2025 10:41 AM    BILITOT 0.3 2024 09:00 AM     AFP Last 3 each if available: No results found for: "AFP", "EXTAFP"    MELD: MELD 3.0: 8 at 2025  5:13 AM  MELD-Na: 6 at 2025  5:13 AM  Calculated from:  Serum Creatinine: 0.7 mg/dL (Using min of 1 mg/dL) at 2025  5:13 AM  Serum Sodium: 139 mmol/L (Using max of 137 mmol/L) at 2025  5:13 AM  Total Bilirubin: 0.1 mg/dL (Using min of 1 mg/dL) at 2025  5:13 AM  Serum Albumin: 3 g/dL at 2025  5:13 AM  INR(ratio): 1 at 2025 10:39 AM  Age at listing (hypothetical): 19 years  Sex: Female at 2025  5:13 AM    IR Discussion/Plan:  MRI 25: Multiple scattered hepatic cysts. Additionally, there are multiple scattered lesions measuring up to 1.4cm that are T1 hypointense, T2 hyperintense, and demonstrate arterial hyperenhancement. The lesions remain hyperintense to background liver on delayed phase images which raises the question of FNH although no central scar is appreciated. Differential also includes adenomas and " hemangiomas (felt less likely). Agree with MRI with Eovist in 6 months.      Committee Discussion:  Adenomas vs FNH's. Hepatic cysts.     Plan:   MRI with [facility] in 6 months.      Follow-up Provider: Dr Novak

## 2025-06-04 ENCOUNTER — RESULTS FOLLOW-UP (OUTPATIENT)
Dept: TRANSPLANT | Facility: CLINIC | Age: 20
End: 2025-06-04

## 2025-06-04 ENCOUNTER — PATIENT MESSAGE (OUTPATIENT)
Dept: HEPATOLOGY | Facility: CLINIC | Age: 20
End: 2025-06-04
Payer: COMMERCIAL

## 2025-06-04 ENCOUNTER — TELEPHONE (OUTPATIENT)
Dept: HEPATOLOGY | Facility: CLINIC | Age: 20
End: 2025-06-04
Payer: COMMERCIAL

## 2025-06-04 DIAGNOSIS — R16.0 LIVER MASSES: ICD-10-CM

## 2025-06-04 DIAGNOSIS — K76.9 LIVER DISEASE, UNSPECIFIED: Primary | ICD-10-CM

## 2025-06-05 ENCOUNTER — OFFICE VISIT (OUTPATIENT)
Dept: HEPATOLOGY | Facility: CLINIC | Age: 20
End: 2025-06-05
Payer: COMMERCIAL

## 2025-06-05 ENCOUNTER — TELEPHONE (OUTPATIENT)
Dept: HEPATOLOGY | Facility: CLINIC | Age: 20
End: 2025-06-05

## 2025-06-05 ENCOUNTER — HOSPITAL ENCOUNTER (EMERGENCY)
Facility: HOSPITAL | Age: 20
Discharge: HOME | End: 2025-06-05
Attending: EMERGENCY MEDICINE
Payer: COMMERCIAL

## 2025-06-05 VITALS
RESPIRATION RATE: 18 BRPM | TEMPERATURE: 98 F | HEART RATE: 81 BPM | WEIGHT: 107 LBS | DIASTOLIC BLOOD PRESSURE: 72 MMHG | SYSTOLIC BLOOD PRESSURE: 102 MMHG | BODY MASS INDEX: 16.79 KG/M2 | HEIGHT: 67 IN | OXYGEN SATURATION: 100 %

## 2025-06-05 DIAGNOSIS — K76.9 LESION OF LIVER: ICD-10-CM

## 2025-06-05 DIAGNOSIS — R10.9 ABDOMINAL PAIN, UNSPECIFIED ABDOMINAL LOCATION: Primary | ICD-10-CM

## 2025-06-05 DIAGNOSIS — R91.1 PULMONARY NODULE: ICD-10-CM

## 2025-06-05 DIAGNOSIS — K76.9 LIVER LESION: Primary | ICD-10-CM

## 2025-06-05 DIAGNOSIS — R10.13 EPIGASTRIC ABDOMINAL PAIN: ICD-10-CM

## 2025-06-05 LAB
ABSOLUTE EOSINOPHIL (OHS): 0.01 K/UL
ABSOLUTE MONOCYTE (OHS): 0.31 K/UL (ref 0.3–1)
ABSOLUTE NEUTROPHIL COUNT (OHS): 3.37 K/UL (ref 1.8–7.7)
ALBUMIN SERPL BCP-MCNC: 4.5 G/DL (ref 3.5–5.2)
ALP SERPL-CCNC: 53 UNIT/L (ref 40–150)
ALT SERPL W/O P-5'-P-CCNC: 12 UNIT/L (ref 10–44)
ANION GAP (OHS): 5 MMOL/L (ref 8–16)
AST SERPL-CCNC: 22 UNIT/L (ref 11–45)
B-HCG UR QL: NEGATIVE
BACTERIA #/AREA URNS AUTO: ABNORMAL /HPF
BASOPHILS # BLD AUTO: 0.01 K/UL
BASOPHILS NFR BLD AUTO: 0.2 %
BILIRUB SERPL-MCNC: 0.2 MG/DL (ref 0.1–1)
BILIRUB UR QL STRIP.AUTO: NEGATIVE
BUN SERPL-MCNC: 8 MG/DL (ref 6–20)
CALCIUM SERPL-MCNC: 10 MG/DL (ref 8.7–10.5)
CHLORIDE SERPL-SCNC: 108 MMOL/L (ref 95–110)
CLARITY UR: ABNORMAL
CO2 SERPL-SCNC: 23 MMOL/L (ref 23–29)
COLOR UR AUTO: YELLOW
CREAT SERPL-MCNC: 0.6 MG/DL (ref 0.5–1.4)
CTP QC/QA: YES
ERYTHROCYTE [DISTWIDTH] IN BLOOD BY AUTOMATED COUNT: 19.9 % (ref 11.5–14.5)
GFR SERPLBLD CREATININE-BSD FMLA CKD-EPI: >60 ML/MIN/1.73/M2
GLUCOSE SERPL-MCNC: 77 MG/DL (ref 70–110)
GLUCOSE UR QL STRIP: NEGATIVE
HCT VFR BLD AUTO: 36.7 % (ref 37–48.5)
HGB BLD-MCNC: 11 GM/DL (ref 12–16)
HGB UR QL STRIP: ABNORMAL
HOLD SPECIMEN: NORMAL
IMM GRANULOCYTES # BLD AUTO: 0.02 K/UL (ref 0–0.04)
IMM GRANULOCYTES NFR BLD AUTO: 0.4 % (ref 0–0.5)
KETONES UR QL STRIP: NEGATIVE
LEUKOCYTE ESTERASE UR QL STRIP: ABNORMAL
LIPASE SERPL-CCNC: 40 U/L (ref 4–60)
LYMPHOCYTES # BLD AUTO: 1.06 K/UL (ref 1–4.8)
MCH RBC QN AUTO: 23.8 PG (ref 27–31)
MCHC RBC AUTO-ENTMCNC: 30 G/DL (ref 32–36)
MCV RBC AUTO: 79 FL (ref 82–98)
MICROSCOPIC COMMENT: ABNORMAL
NITRITE UR QL STRIP: NEGATIVE
NUCLEATED RBC (/100WBC) (OHS): 0 /100 WBC
PH UR STRIP: 7 [PH]
PLATELET # BLD AUTO: 423 K/UL (ref 150–450)
PMV BLD AUTO: 11.1 FL (ref 9.2–12.9)
POTASSIUM SERPL-SCNC: 3.9 MMOL/L (ref 3.5–5.1)
PROT SERPL-MCNC: 8.4 GM/DL (ref 6–8.4)
PROT UR QL STRIP: NEGATIVE
RBC # BLD AUTO: 4.62 M/UL (ref 4–5.4)
RBC #/AREA URNS AUTO: 44 /HPF (ref 0–4)
RELATIVE EOSINOPHIL (OHS): 0.2 %
RELATIVE LYMPHOCYTE (OHS): 22.2 % (ref 18–48)
RELATIVE MONOCYTE (OHS): 6.5 % (ref 4–15)
RELATIVE NEUTROPHIL (OHS): 70.5 % (ref 38–73)
SODIUM SERPL-SCNC: 136 MMOL/L (ref 136–145)
SP GR UR STRIP: 1.01
SQUAMOUS #/AREA URNS AUTO: 12 /HPF
UROBILINOGEN UR STRIP-ACNC: NEGATIVE EU/DL
WBC # BLD AUTO: 4.78 K/UL (ref 3.9–12.7)
WBC #/AREA URNS AUTO: 16 /HPF (ref 0–5)

## 2025-06-05 PROCEDURE — 99285 EMERGENCY DEPT VISIT HI MDM: CPT | Mod: 25

## 2025-06-05 PROCEDURE — 82040 ASSAY OF SERUM ALBUMIN: CPT | Performed by: PHYSICIAN ASSISTANT

## 2025-06-05 PROCEDURE — 25500020 PHARM REV CODE 255: Performed by: EMERGENCY MEDICINE

## 2025-06-05 PROCEDURE — 96374 THER/PROPH/DIAG INJ IV PUSH: CPT

## 2025-06-05 PROCEDURE — 81025 URINE PREGNANCY TEST: CPT | Performed by: PHYSICIAN ASSISTANT

## 2025-06-05 PROCEDURE — 83690 ASSAY OF LIPASE: CPT | Performed by: PHYSICIAN ASSISTANT

## 2025-06-05 PROCEDURE — 85025 COMPLETE CBC W/AUTO DIFF WBC: CPT | Performed by: PHYSICIAN ASSISTANT

## 2025-06-05 PROCEDURE — 87077 CULTURE AEROBIC IDENTIFY: CPT | Performed by: PHYSICIAN ASSISTANT

## 2025-06-05 PROCEDURE — 63600175 PHARM REV CODE 636 W HCPCS: Mod: JZ,TB | Performed by: PHYSICIAN ASSISTANT

## 2025-06-05 PROCEDURE — 81003 URINALYSIS AUTO W/O SCOPE: CPT | Performed by: PHYSICIAN ASSISTANT

## 2025-06-05 RX ORDER — KETOROLAC TROMETHAMINE 30 MG/ML
15 INJECTION, SOLUTION INTRAMUSCULAR; INTRAVENOUS
Status: COMPLETED | OUTPATIENT
Start: 2025-06-05 | End: 2025-06-05

## 2025-06-05 RX ADMIN — KETOROLAC TROMETHAMINE 15 MG: 30 INJECTION, SOLUTION INTRAMUSCULAR; INTRAVENOUS at 02:06

## 2025-06-05 RX ADMIN — IOHEXOL 75 ML: 350 INJECTION, SOLUTION INTRAVENOUS at 02:06

## 2025-06-05 NOTE — ED PROVIDER NOTES
Encounter Date: 6/5/2025       History     Chief Complaint   Patient presents with    Abdominal Pain     Right sided abdominal pain. Known benign tumor to liver. Pt having N/V.     19 y.o. Female with a PMHx of Multiple lesions in the liver currently being worked up with hepatology presents today with RUQ abdominal pain x1 week. She had one episode of vomiting today. She feels hot but has not check temperature. She reports loss of appetite. Last BM was 2 days ago. She has first virual appointment with hematologist today at 3 pm. She denies urinary symptoms or diarrhea.     The history is provided by the patient.     Review of patient's allergies indicates:  No Known Allergies  Past Medical History:   Diagnosis Date    Proteinuria, postural 2012    benign     Past Surgical History:   Procedure Laterality Date    COLONOSCOPY N/A 8/6/2021    Procedure: COLONOSCOPY;  Surgeon: Constantino Shea MD;  Location: [facility] ([address] [place] [address]);  Service: Endoscopy;  Laterality: N/A;    ESOPHAGOGASTRODUODENOSCOPY N/A 8/6/2021    Procedure: (EGD);  Surgeon: Constantino Shea MD;  Location: [facility] ([address] [place] [address]);  Service: Endoscopy;  Laterality: N/A;  covid test 8/3 [facility]     Family History   Problem Relation Name Age of Onset    Sleep apnea Mother      Hypertension Mother      Mitral valve prolapse Mother      Alcohol abuse Mother      Drug abuse Mother      Alcohol abuse Father      Drug abuse Father      Mental illness Father      Congenital heart disease Sister      Learning disabilities Brother      Diabetes Maternal Grandmother      Ovarian cancer Maternal Grandmother      Hypertension Maternal Grandmother      Hyperlipidemia Maternal Grandmother      Obesity Paternal Aunt      Diabetes Paternal Aunt      Obesity Paternal Uncle      Hypertension Paternal Uncle      Diabetes Paternal Uncle      Hyperlipidemia Paternal Uncle      Diabetes Paternal Grandmother      Hyperlipidemia Paternal Grandmother      Hypertension Paternal  Grandmother      Cancer Paternal Grandmother  62        ovarian    Melanoma Neg Hx      Psoriasis Neg Hx      Lupus Neg Hx       Social History[1]  Review of Systems    Physical Exam     Initial Vitals [06/05/25 1258]   BP Pulse Resp Temp SpO2   127/63 95 18 98.5 °F (36.9 °C) 100 %      MAP       --         Physical Exam    Nursing note and vitals reviewed.  Constitutional: She appears well-developed and well-nourished. She is not diaphoretic. No distress.   HENT:   Head: Normocephalic and atraumatic.   Nose: Nose normal.   Eyes: Conjunctivae and EOM are normal.   Neck: Neck supple.   Cardiovascular:  Normal rate.           Pulmonary/Chest: No respiratory distress.   Abdominal: Abdomen is soft. There is abdominal tenderness in the right upper quadrant and epigastric area. There is guarding.   Musculoskeletal:      Cervical back: Neck supple.     Neurological: She is alert and oriented to person, place, and time. Gait normal.   Skin: No rash noted.   Psychiatric: She has a normal mood and affect. Thought content normal.         ED Course   Procedures  Labs Reviewed   COMPREHENSIVE METABOLIC PANEL - Abnormal       Result Value    Sodium 136      Potassium 3.9      Chloride 108      CO2 23      Glucose 77      BUN 8      Creatinine 0.6      Calcium 10.0      Protein Total 8.4      Albumin 4.5      Bilirubin Total 0.2      ALP 53      AST 22      ALT 12      Anion Gap 5 (*)     eGFR >60     URINALYSIS, REFLEX TO URINE CULTURE - Abnormal    Color, UA Yellow      Appearance, UA Hazy (*)     pH, UA 7.0      Spec Grav UA 1.010      Protein, UA Negative      Glucose, UA Negative      Ketones, UA Negative      Bilirubin, UA Negative      Blood, UA 2+ (*)     Nitrites, UA Negative      Urobilinogen, UA Negative      Leukocyte Esterase, UA 2+ (*)    CBC WITH DIFFERENTIAL - Abnormal    WBC 4.78      RBC 4.62      HGB 11.0 (*)     HCT 36.7 (*)     MCV 79 (*)     MCH 23.8 (*)     MCHC 30.0 (*)     RDW 19.9 (*)     Platelet Count  423      MPV 11.1      Nucleated RBC 0      Neut % 70.5      Lymph % 22.2      Mono % 6.5      Eos % 0.2      Basophil % 0.2      Imm Grans % 0.4      Neut # 3.37      Lymph # 1.06      Mono # 0.31      Eos # 0.01      Baso # 0.01      Imm Grans # 0.02     URINALYSIS MICROSCOPIC - Abnormal    RBC, UA 44 (*)     WBC, UA 16 (*)     Bacteria, UA Moderate (*)     Squamous Epithelial Cells, UA 12      Microscopic Comment       LIPASE - Normal    Lipase Level 40     CULTURE, URINE   CBC W/ AUTO DIFFERENTIAL    Narrative:     The following orders were created for panel order CBC W/ AUTO DIFFERENTIAL.  Procedure                               Abnormality         Status                     ---------                               -----------         ------                     CBC with Differential[[id number]]       Abnormal            Final result                 Please view results for these tests on the individual orders.   GREY TOP URINE HOLD    Extra Tube Hold for add-ons.     POCT URINE PREGNANCY    POC Preg Test, Ur Negative       Acceptable Yes            Imaging Results              CT Abdomen Pelvis With IV Contrast NO Oral Contrast (Final result)  Result time 06/05/25 15:05:20      Final result by Jose Luis Estrada MD (06/05/25 15:05:20)                   Impression:      2.8 cm left-sided Bartholin gland cyst.    Subcentimeter hepatic hypodensities and a subcentimeter focus of hepatic enhancement too small to characterize      Electronically signed by: Jose Luis Estrada MD  Date:    06/05/2025  Time:    15:05               Narrative:    EXAMINATION:  CT ABDOMEN PELVIS WITH IV CONTRAST    CLINICAL HISTORY:  RUQ pain. hx of liver lesions. N/V;    TECHNIQUE:  Low dose axial images, sagittal and coronal reformations were obtained from the lung bases to the pubic symphysis following the IV administration of 75 mL of Omnipaque 350    FINDINGS:  The visualized portion of the base of the lungs is significant for a  0.4 cm left lower lobe noncalcified pulmonary nodule.  The visualized portion of the heart, stomach, spleen, pancreas, and gallbladder are unremarkable.  There are 2 subcentimeter hepatic hypodensities too small to characterize as well as a subcentimeter focus of enhancement within the anterior segment the right lobe of the liver too small to characterize.  The gallbladder is unremarkable.  The adrenal glands and visualized portion of the aorta have a normal appearance.  The kidneys have a normal appearance.  The bladder is distended but otherwise unremarkable.  The uterus has a normal contour.  The bowel is significant for a moderate amount of stool within the colon.  The appendix is not visualized.  There is no convincing evidence appendicitis.  There is a 2.8 cm left sided bartholin gland cyst.  The osseous structures are unremarkable.                                       Medications   ketorolac injection 15 mg (15 mg Intravenous Given 6/5/25 1417)   iohexoL (OMNIPAQUE 350) injection 75 mL (75 mLs Intravenous Given 6/5/25 1455)     Medical Decision Making  19 y.o. Female with a PMHx of Multiple Lesions in the liver currently being worked up with hepatology presents today with RUQ abdominal pain x1 week. Nontoxic appearing. Hemodynamically stable. Afebrile. Exam as above. I will initiate workup and reassess.    Ddx: Acute cholecystitis, hepatitis, pancreatitis, gastritis      Workup today is reassuring.  Labs without leukocytosis.  Anemia noted though stable.  Chemistry without acute findings.  UA does show 2+ blood however patient just started her menstrual cycle.  She has states that she occasionally gets similar abdominal cramping that she is experiencing today when she is on her menstrual cycle.  May consider this as cause of her symptoms today.  2+ leukocytes however squamous cells present on microscopic. CT unrevealing for cause of RUQ abdominal pain. Patient reassessed and pain is improving after Toradol.   Given reassuring findings and improvement patient is stable at this time for discharge.    Amount and/or Complexity of Data Reviewed  Labs: ordered. Decision-making details documented in ED Course.  Radiology: ordered.    Risk  Prescription drug management.               ED Course as of 06/05/25 1607   Thu Jun 05, 2025   1501 WBC: 4.78  Labs without leukocytosis [HM]   1501 Hemoglobin(!): 11.0 [HM]   1501 Hematocrit(!): 36.7 [HM]   1501 Comp. Metabolic Panel(!)  Unremarkable [HM]   1501 Urinalysis, Reflex to Urine Culture Urine, Clean Catch(!)  UA positive for 2+ blood and 2+ leukocytes.  Patient denies urinary symptoms  [HM]   1502 hCG Qualitative, Urine: Negative [HM]   1502 Bacteria, UA(!): Moderate [HM]   1502 WBC, UA(!): 16 [HM]   1502 RBC, UA(!): 44  Patient is on her menstrual cycle [HM]   1512 Squam Epithel, UA: 12 [HM]      ED Course User Index  [HM] Grace Burns PA-C                           Clinical Impression:  Final diagnoses:  [R10.9] Abdominal pain, unspecified abdominal location (Primary)  [K76.9] Lesion of liver  [R91.1] Pulmonary nodule          ED Disposition Condition    Discharge Stable          ED Prescriptions    None       Follow-up Information       Follow up With Specialties Details Why Contact Info Additional Information    [facility] - Emergency Dept Emergency Medicine  If symptoms worsen [address] [facility] [address] [phone]     Hepatology Clinic - [facility] Hepatology Schedule an appointment as soon as possible for a visit   [address] [facility] [address] [phone] [facility] - Main Building, 1st floor near Clinic elevator Please park in [facility] and walk through Clinic elevator hallway    [facility]  Schedule an appointment as soon as possible for a visit  for anemia [facility] [facility] [id number]                    [1]   Social  History  Tobacco Use    Smoking status: Some Days     Types: Vaping with nicotine, Vaping w/o nicotine    Smokeless tobacco: Current   Substance Use Topics    Alcohol use: No    Drug use: No        Grace Burns PA-C  06/05/25 7539

## 2025-06-05 NOTE — Clinical Note
"Alessandro Boblalita" Sajan was seen and treated in our emergency department on 6/5/2025.  She may return to work on 06/06/2025.       If you have any questions or concerns, please don't hesitate to call.      Patricio Marr RN    "

## 2025-06-05 NOTE — DISCHARGE INSTRUCTIONS
Your workup today is reassuring.  Anemia noted, follow up with your primary care provider for this finding.  Follow up with hepatology for liver lesions. Strict ED precautions given to return immediately for new, worsening, or concerning symptoms

## 2025-06-05 NOTE — ED TRIAGE NOTES
20 y/o F presents to ER with c/c RUQ abd pain x 2 weeks. H/x liver tumors. Endorses intermittent nausea with vomiting, but denies c/p, SOB, and nausea.

## 2025-06-07 LAB — BACTERIA UR CULT: ABNORMAL

## 2025-06-09 ENCOUNTER — RESULTS FOLLOW-UP (OUTPATIENT)
Dept: EMERGENCY MEDICINE | Facility: HOSPITAL | Age: 20
End: 2025-06-09

## 2025-06-12 ENCOUNTER — HOSPITAL ENCOUNTER (EMERGENCY)
Facility: HOSPITAL | Age: 20
Discharge: HOME | End: 2025-06-12
Attending: STUDENT IN AN ORGANIZED HEALTH CARE EDUCATION/TRAINING PROGRAM
Payer: COMMERCIAL

## 2025-06-12 VITALS
BODY MASS INDEX: 17.27 KG/M2 | DIASTOLIC BLOOD PRESSURE: 72 MMHG | RESPIRATION RATE: 16 BRPM | HEART RATE: 66 BPM | TEMPERATURE: 98 F | HEIGHT: 67 IN | OXYGEN SATURATION: 97 % | SYSTOLIC BLOOD PRESSURE: 115 MMHG | WEIGHT: 110 LBS

## 2025-06-12 DIAGNOSIS — B37.31 VULVOVAGINAL CANDIDIASIS: ICD-10-CM

## 2025-06-12 DIAGNOSIS — N39.0 URINARY TRACT INFECTION WITH HEMATURIA, SITE UNSPECIFIED: Primary | ICD-10-CM

## 2025-06-12 DIAGNOSIS — R31.9 URINARY TRACT INFECTION WITH HEMATURIA, SITE UNSPECIFIED: Primary | ICD-10-CM

## 2025-06-12 LAB
ABSOLUTE EOSINOPHIL (OHS): 0.02 K/UL
ABSOLUTE MONOCYTE (OHS): 0.35 K/UL (ref 0.3–1)
ABSOLUTE NEUTROPHIL COUNT (OHS): 2.39 K/UL (ref 1.8–7.7)
ALBUMIN SERPL BCP-MCNC: 4.3 G/DL (ref 3.5–5.2)
ALP SERPL-CCNC: 54 UNIT/L (ref 40–150)
ALT SERPL W/O P-5'-P-CCNC: 11 UNIT/L (ref 10–44)
ANION GAP (OHS): 7 MMOL/L (ref 8–16)
AST SERPL-CCNC: 19 UNIT/L (ref 11–45)
B-HCG UR QL: NEGATIVE
BACTERIA #/AREA URNS AUTO: ABNORMAL /HPF
BACTERIA GENITAL QL WET PREP: ABNORMAL
BASOPHILS # BLD AUTO: 0.02 K/UL
BASOPHILS NFR BLD AUTO: 0.5 %
BILIRUB SERPL-MCNC: 0.2 MG/DL (ref 0.1–1)
BILIRUB UR QL STRIP.AUTO: NEGATIVE
BUN SERPL-MCNC: 12 MG/DL (ref 6–20)
BUN SERPL-MCNC: 12 MG/DL (ref 6–30)
C TRACH DNA SPEC QL NAA+PROBE: NOT DETECTED
CALCIUM SERPL-MCNC: 9.4 MG/DL (ref 8.7–10.5)
CHLORIDE SERPL-SCNC: 106 MMOL/L (ref 95–110)
CHLORIDE SERPL-SCNC: 107 MMOL/L (ref 95–110)
CLARITY UR: ABNORMAL
CLUE CELLS VAG QL WET PREP: ABNORMAL
CO2 SERPL-SCNC: 20 MMOL/L (ref 23–29)
COLOR UR AUTO: YELLOW
CREAT SERPL-MCNC: 0.6 MG/DL (ref 0.5–1.4)
CREAT SERPL-MCNC: 0.6 MG/DL (ref 0.5–1.4)
CTGC SOURCE (OHS) ORD-325: NORMAL
CTP QC/QA: YES
ERYTHROCYTE [DISTWIDTH] IN BLOOD BY AUTOMATED COUNT: 19.1 % (ref 11.5–14.5)
FILAMENT FUNGI VAG WET PREP-#/AREA: ABNORMAL
GFR SERPLBLD CREATININE-BSD FMLA CKD-EPI: >60 ML/MIN/1.73/M2
GLUCOSE SERPL-MCNC: 83 MG/DL (ref 70–110)
GLUCOSE SERPL-MCNC: 90 MG/DL (ref 70–110)
GLUCOSE UR QL STRIP: NEGATIVE
HCT VFR BLD AUTO: 34.8 % (ref 37–48.5)
HCT VFR BLD CALC: 36 %PCV (ref 36–54)
HGB BLD-MCNC: 10.5 GM/DL (ref 12–16)
HGB UR QL STRIP: ABNORMAL
HOLD SPECIMEN: NORMAL
HYALINE CASTS UR QL AUTO: 3 /LPF (ref 0–1)
IMM GRANULOCYTES # BLD AUTO: 0.01 K/UL (ref 0–0.04)
IMM GRANULOCYTES NFR BLD AUTO: 0.2 % (ref 0–0.5)
KETONES UR QL STRIP: NEGATIVE
LEUKOCYTE ESTERASE UR QL STRIP: ABNORMAL
LIPASE SERPL-CCNC: 46 U/L (ref 4–60)
LYMPHOCYTES # BLD AUTO: 1.28 K/UL (ref 1–4.8)
MCH RBC QN AUTO: 23.9 PG (ref 27–31)
MCHC RBC AUTO-ENTMCNC: 30.2 G/DL (ref 32–36)
MCV RBC AUTO: 79 FL (ref 82–98)
MICROSCOPIC COMMENT: ABNORMAL
N GONORRHOEA DNA UR QL NAA+PROBE: NOT DETECTED
NITRITE UR QL STRIP: NEGATIVE
NUCLEATED RBC (/100WBC) (OHS): 0 /100 WBC
PH UR STRIP: 7 [PH]
PLATELET # BLD AUTO: 410 K/UL (ref 150–450)
PMV BLD AUTO: 11 FL (ref 9.2–12.9)
POC IONIZED CALCIUM: 1.24 MMOL/L (ref 1.06–1.42)
POC TCO2 (MEASURED): 21 MMOL/L (ref 23–29)
POTASSIUM BLD-SCNC: 3.9 MMOL/L (ref 3.5–5.1)
POTASSIUM SERPL-SCNC: 3.8 MMOL/L (ref 3.5–5.1)
PROT SERPL-MCNC: 8.1 GM/DL (ref 6–8.4)
PROT UR QL STRIP: ABNORMAL
RBC # BLD AUTO: 4.4 M/UL (ref 4–5.4)
RBC #/AREA URNS AUTO: 77 /HPF (ref 0–4)
RELATIVE EOSINOPHIL (OHS): 0.5 %
RELATIVE LYMPHOCYTE (OHS): 31.4 % (ref 18–48)
RELATIVE MONOCYTE (OHS): 8.6 % (ref 4–15)
RELATIVE NEUTROPHIL (OHS): 58.8 % (ref 38–73)
SAMPLE: ABNORMAL
SODIUM BLD-SCNC: 138 MMOL/L (ref 136–145)
SODIUM SERPL-SCNC: 134 MMOL/L (ref 136–145)
SP GR UR STRIP: 1.02
SQUAMOUS #/AREA URNS AUTO: 5 /HPF
T VAGINALIS GENITAL QL WET PREP: ABNORMAL
UROBILINOGEN UR STRIP-ACNC: NEGATIVE EU/DL
WBC # BLD AUTO: 4.07 K/UL (ref 3.9–12.7)
WBC #/AREA URNS AUTO: >100 /HPF (ref 0–5)
WBC #/AREA VAG WET PREP: ABNORMAL
YEAST GENITAL QL WET PREP: ABNORMAL

## 2025-06-12 PROCEDURE — 25000003 PHARM REV CODE 250: Performed by: STUDENT IN AN ORGANIZED HEALTH CARE EDUCATION/TRAINING PROGRAM

## 2025-06-12 PROCEDURE — 82040 ASSAY OF SERUM ALBUMIN: CPT | Performed by: STUDENT IN AN ORGANIZED HEALTH CARE EDUCATION/TRAINING PROGRAM

## 2025-06-12 PROCEDURE — 63600175 PHARM REV CODE 636 W HCPCS: Mod: JZ,TB | Performed by: STUDENT IN AN ORGANIZED HEALTH CARE EDUCATION/TRAINING PROGRAM

## 2025-06-12 PROCEDURE — 81025 URINE PREGNANCY TEST: CPT | Performed by: STUDENT IN AN ORGANIZED HEALTH CARE EDUCATION/TRAINING PROGRAM

## 2025-06-12 PROCEDURE — 81003 URINALYSIS AUTO W/O SCOPE: CPT | Performed by: STUDENT IN AN ORGANIZED HEALTH CARE EDUCATION/TRAINING PROGRAM

## 2025-06-12 PROCEDURE — 96374 THER/PROPH/DIAG INJ IV PUSH: CPT

## 2025-06-12 PROCEDURE — 87077 CULTURE AEROBIC IDENTIFY: CPT | Performed by: STUDENT IN AN ORGANIZED HEALTH CARE EDUCATION/TRAINING PROGRAM

## 2025-06-12 PROCEDURE — 82330 ASSAY OF CALCIUM: CPT

## 2025-06-12 PROCEDURE — 80047 BASIC METABLC PNL IONIZED CA: CPT

## 2025-06-12 PROCEDURE — 96375 TX/PRO/DX INJ NEW DRUG ADDON: CPT

## 2025-06-12 PROCEDURE — 85025 COMPLETE CBC W/AUTO DIFF WBC: CPT | Performed by: STUDENT IN AN ORGANIZED HEALTH CARE EDUCATION/TRAINING PROGRAM

## 2025-06-12 PROCEDURE — 87591 N.GONORRHOEAE DNA AMP PROB: CPT | Performed by: STUDENT IN AN ORGANIZED HEALTH CARE EDUCATION/TRAINING PROGRAM

## 2025-06-12 PROCEDURE — 87210 SMEAR WET MOUNT SALINE/INK: CPT | Performed by: STUDENT IN AN ORGANIZED HEALTH CARE EDUCATION/TRAINING PROGRAM

## 2025-06-12 PROCEDURE — 99285 EMERGENCY DEPT VISIT HI MDM: CPT | Mod: 25

## 2025-06-12 PROCEDURE — 83690 ASSAY OF LIPASE: CPT | Performed by: STUDENT IN AN ORGANIZED HEALTH CARE EDUCATION/TRAINING PROGRAM

## 2025-06-12 RX ORDER — KETOROLAC TROMETHAMINE 30 MG/ML
10 INJECTION, SOLUTION INTRAMUSCULAR; INTRAVENOUS
Status: COMPLETED | OUTPATIENT
Start: 2025-06-12 | End: 2025-06-12

## 2025-06-12 RX ORDER — CEFTRIAXONE 1 G/1
1 INJECTION, POWDER, FOR SOLUTION INTRAMUSCULAR; INTRAVENOUS
Status: COMPLETED | OUTPATIENT
Start: 2025-06-12 | End: 2025-06-12

## 2025-06-12 RX ORDER — ONDANSETRON HYDROCHLORIDE 2 MG/ML
4 INJECTION, SOLUTION INTRAVENOUS
Status: COMPLETED | OUTPATIENT
Start: 2025-06-12 | End: 2025-06-12

## 2025-06-12 RX ORDER — SULFAMETHOXAZOLE AND TRIMETHOPRIM 800; 160 MG/1; MG/1
1 TABLET ORAL 2 TIMES DAILY
Qty: 14 TABLET | Refills: 0 | Status: SHIPPED | OUTPATIENT
Start: 2025-06-12 | End: 2025-06-19

## 2025-06-12 RX ORDER — FLUCONAZOLE 150 MG/1
150 TABLET ORAL
Status: COMPLETED | OUTPATIENT
Start: 2025-06-12 | End: 2025-06-12

## 2025-06-12 RX ORDER — FLUCONAZOLE 150 MG/1
150 TABLET ORAL DAILY
Qty: 1 TABLET | Refills: 0 | Status: SHIPPED | OUTPATIENT
Start: 2025-06-12 | End: 2025-06-13

## 2025-06-12 RX ADMIN — ONDANSETRON 4 MG: 2 INJECTION INTRAMUSCULAR; INTRAVENOUS at 12:06

## 2025-06-12 RX ADMIN — FLUCONAZOLE 150 MG: 150 TABLET ORAL at 02:06

## 2025-06-12 RX ADMIN — CEFTRIAXONE 1 G: 1 INJECTION, POWDER, FOR SOLUTION INTRAMUSCULAR; INTRAVENOUS at 02:06

## 2025-06-12 RX ADMIN — KETOROLAC TROMETHAMINE 10 MG: 30 INJECTION, SOLUTION INTRAMUSCULAR; INTRAVENOUS at 12:06

## 2025-06-12 NOTE — ED PROVIDER NOTES
Encounter Date: 6/12/2025       History     Chief Complaint   Patient presents with    Abdominal Pain     Started week ago     19-year-old female with a history of recurrent Bartholin cysts, microcytic anemia, intra-abdominal fluid collection status post IR drainage in April of this year presenting to the emergency department for right lower quadrant pain, vaginal discharge, and dysuria.  Symptoms have been ongoing for about a week.  The pain does not radiate.  She denies any nausea or vomiting, fevers or chills.  She is sexually active with 1 female partner who has not had any vaginal symptoms to her knowledge.        Review of patient's allergies indicates:  No Known Allergies  Past Medical History:   Diagnosis Date    Proteinuria, postural 2012    benign     Past Surgical History:   Procedure Laterality Date    COLONOSCOPY N/A 8/6/2021    Procedure: COLONOSCOPY;  Surgeon: Constantino Shea MD;  Location: [facility] (2ND FLR);  Service: Endoscopy;  Laterality: N/A;    ESOPHAGOGASTRODUODENOSCOPY N/A 8/6/2021    Procedure: (EGD);  Surgeon: Constantino Shea MD;  Location: [facility] ([facility] FLR);  Service: Endoscopy;  Laterality: N/A;  covid test 8/3 [facility]     Family History   Problem Relation Name Age of Onset    Sleep apnea Mother      Hypertension Mother      Mitral valve prolapse Mother      Alcohol abuse Mother      Drug abuse Mother      Alcohol abuse Father      Drug abuse Father      Mental illness Father      Congenital heart disease Sister      Learning disabilities Brother      Diabetes Maternal Grandmother      Ovarian cancer Maternal Grandmother      Hypertension Maternal Grandmother      Hyperlipidemia Maternal Grandmother      Obesity Paternal Aunt      Diabetes Paternal Aunt      Obesity Paternal Uncle      Hypertension Paternal Uncle      Diabetes Paternal Uncle      Hyperlipidemia Paternal Uncle      Diabetes Paternal Grandmother      Hyperlipidemia Paternal Grandmother      Hypertension Paternal  Grandmother      Cancer Paternal Grandmother  62        ovarian    Melanoma Neg Hx      Psoriasis Neg Hx      Lupus Neg Hx       Social History[1]  Review of Systems   Constitutional:  Negative for fever.   HENT:  Negative for sore throat.    Respiratory:  Negative for shortness of breath.    Cardiovascular:  Negative for chest pain.   Gastrointestinal:  Positive for abdominal pain. Negative for nausea.   Genitourinary:  Positive for dysuria. Negative for flank pain.   Musculoskeletal:  Negative for back pain.   Skin:  Negative for rash.   Neurological:  Negative for weakness.   Hematological:  Does not bruise/bleed easily.       Physical Exam     Initial Vitals [06/12/25 1004]   BP Pulse Resp Temp SpO2   (!) 102/55 100 18 98 °F (36.7 °C) 100 %      MAP       --         Physical Exam    Nursing note and vitals reviewed.  Constitutional: She appears well-developed and well-nourished. She is not diaphoretic. No distress.   HENT:   Head: Normocephalic and atraumatic.   Eyes: EOM are normal. Pupils are equal, round, and reactive to light.   Neck: Neck supple.   Normal range of motion.  Cardiovascular:  Normal rate and regular rhythm.           Pulmonary/Chest: Breath sounds normal.   Abdominal: Abdomen is soft. Bowel sounds are normal. She exhibits no distension and no mass. There is abdominal tenderness (RLQ, mild). There is no rebound and no guarding.   Genitourinary:    Genitourinary Comments: Performed with nurse chaperone present.  There is moderate whitish vaginal discharge noted.  There is right-sided adnexal tenderness without mass present.  No significant left adnexal tenderness or CMT.     Musculoskeletal:         General: No tenderness or edema. Normal range of motion.      Cervical back: Normal range of motion and neck supple.     Neurological: She is alert and oriented to person, place, and time.   Skin: Skin is warm and dry. Capillary refill takes less than 2 seconds.         ED Course   Procedures  Labs  Reviewed   WET PREP, GENITAL - Abnormal       Result Value    WBC Moderate (*)     Bacteria Few (*)     Clue Cells None      TRICHOMONAS  None      BUDDING YEAST Rare (*)     FUNGAL HYPHAE None     CULTURE, URINE - Abnormal    Urine Culture 10,000 - 49,999 cfu/ml Escherichia coli (*)     Urine Culture >100,000 cfu/ml Streptococcus agalactiae (Group B) (*)    COMPREHENSIVE METABOLIC PANEL - Abnormal    Sodium 134 (*)     Potassium 3.8      Chloride 107      CO2 20 (*)     Glucose 83      BUN 12      Creatinine 0.6      Calcium 9.4      Protein Total 8.1      Albumin 4.3      Bilirubin Total 0.2      ALP 54      AST 19      ALT 11      Anion Gap 7 (*)     eGFR >60     URINALYSIS, REFLEX TO URINE CULTURE - Abnormal    Color, UA Yellow      Appearance, UA Hazy (*)     pH, UA 7.0      Spec Grav UA 1.020      Protein, UA 1+ (*)     Glucose, UA Negative      Ketones, UA Negative      Bilirubin, UA Negative      Blood, UA 1+ (*)     Nitrites, UA Negative      Urobilinogen, UA Negative      Leukocyte Esterase, UA 3+ (*)    CBC WITH DIFFERENTIAL - Abnormal    WBC 4.07      RBC 4.40      HGB 10.5 (*)     HCT 34.8 (*)     MCV 79 (*)     MCH 23.9 (*)     MCHC 30.2 (*)     RDW 19.1 (*)     Platelet Count 410      MPV 11.0      Nucleated RBC 0      Neut % 58.8      Lymph % 31.4      Mono % 8.6      Eos % 0.5      Basophil % 0.5      Imm Grans % 0.2      Neut # 2.39      Lymph # 1.28      Mono # 0.35      Eos # 0.02      Baso # 0.02      Imm Grans # 0.01     URINALYSIS MICROSCOPIC - Abnormal    RBC, UA 77 (*)     WBC, UA >100 (*)     Bacteria, UA Many (*)     Squamous Epithelial Cells, UA 5      Hyaline Casts, UA 3 (*)     Microscopic Comment       ISTAT PROCEDURE - Abnormal    POC Glucose 90      POC BUN 12      POC Creatinine 0.6      POC Sodium 138      POC Potassium 3.9      POC Chloride 106      POC TCO2 (MEASURED) 21 (*)     POC Ionized Calcium 1.24      POC Hematocrit 36      Sample SARAH     LIPASE - Normal    Lipase Level 46      CBC W/ AUTO DIFFERENTIAL    Narrative:     The following orders were created for panel order CBC W/ AUTO DIFFERENTIAL.  Procedure                               Abnormality         Status                     ---------                               -----------         ------                     CBC with Differential[[id number]]       Abnormal            Final result                 Please view results for these tests on the individual orders.   C. TRACHOMATIS/N. GONORRHOEAE BY AMP DNA    CTGC Source Cervicovaginal      Chlamydia, Amplified DNA Not Detected      N gonorrhoeae, amplified DNA Not Detected     GREY TOP URINE HOLD    Extra Tube Hold for add-ons.     POCT URINE PREGNANCY    POC Preg Test, Ur Negative       Acceptable Yes            Imaging Results              US Pelvis Comp with Transvag NON-OB (xpd (Final result)  Result time 06/12/25 15:56:54      Final result by Constantino Whitmore MD (06/12/25 15:56:54)                   Impression:      No acute/significant sonographic abnormality.      Electronically signed by: Constantino Whitmore MD  Date:    06/12/2025  Time:    15:56               Narrative:    EXAMINATION:  US PELVIS COMP WITH TRANSVAG NON-OB (XPD)    CLINICAL HISTORY:  RLQ abdominal pain;    TECHNIQUE:  Transabdominal sonography of the pelvis was performed, followed by transvaginal sonography to better evaluate the uterus and ovaries.    COMPARISON:  CT abdomen and pelvis 06/05/2025, MRI pelvis and pelvic ultrasound 04/10/2025    FINDINGS:  Uterus:    Measures 6.2 x 3.4 x 4.1 cm in dimensions.  No discrete uterine fibroids identified.  The endometrium is normal thickness measuring 2 mm.  Nabothian cysts noted.    Ovaries:    The ovaries are normal in size each containing follicles.  The right ovary measures 3.2 x 3.9 x 3.1 cm.  The left ovary measures 2.6 x 0.9 x 3.8 cm.  Doppler flow demonstrated to both ovaries.    Free Fluid:    Trace volume nonspecific simple appearing free fluid in  the cul-de-sac, commonly physiologic in this age group.                                       Medications   ondansetron injection 4 mg (4 mg Intravenous Given 6/12/25 1206)   ketorolac injection 9.999 mg (9.999 mg Intravenous Given 6/12/25 1206)   cefTRIAXone injection 1 g (1 g Intravenous Given 6/12/25 1424)   fluconazole tablet 150 mg (150 mg Oral Given 6/12/25 1424)     Medical Decision Making  19-year-old female with a history of recurrent Bartholin cysts, microcytic anemia, intra-abdominal fluid collection status post IR drainage in April of this year presenting to the emergency department for right lower quadrant pain, vaginal discharge, and dysuria.    Initial vital signs: stable, afebrile    Initial physical exam: RLQ ttp, and right adnexal tenderness, moderate vaginal discharge.    Given the above, labs, urinalysis, and pelvic ultrasound were obtained.  Patient given Toradol for pain.        I have reviewed and independently interpreted all available laboratory and imaging studies.    Pelvic ultrasound grossly stable, no evidence of recurrent fluid collection, TOA.  Patient's workup revealed evidence of a UTI as well as swabs positive for yeast.  STI testing negative.  Patient feeling remarkably improved after dose of Toradol, tolerated p.o. intake without difficulty.  Comfortable with plan for discharge home with antibiotics for UTI.  Given dose of fluconazole here, with additional dose prescribed and patient instructed to take if symptoms not improving in the next 72 hours. Strict return precautions were discussed, patient verbalized understanding and agreed with plan.  Patient discharged home.    Amount and/or Complexity of Data Reviewed  Labs: ordered.  Radiology: ordered.    Risk  Prescription drug management.               ED Course as of 06/14/25 1116   Thu Jun 12, 2025   1245 Patient reporting improvement in pain.  Initial results reviewed with her which demonstrates mild Ace infection and UTI.  We  will start antibiotics and oral fluconazole here, pending pelvic ultrasound. [MB]      ED Course User Index  [MB] Grant Mcknight MD                           Clinical Impression:  Final diagnoses:  [N39.0, R31.9] Urinary tract infection with hematuria, site unspecified (Primary)  [B37.31] Vulvovaginal candidiasis          ED Disposition Condition    Discharge Stable          ED Prescriptions       Medication Sig Dispense Start Date End Date Auth. Provider    fluconazole (DIFLUCAN) 150 MG Tab () Take 1 tablet (150 mg total) by mouth once daily. for 1 day 1 tablet 2025 Grant Mcknight MD    sulfamethoxazole-trimethoprim 800-160mg (BACTRIM DS) 800-160 mg Tab Take 1 tablet by mouth 2 (two) times daily. for 7 days 14 tablet 2025 Grant Mcknight MD          Follow-up Information    None                [1]   Social History  Tobacco Use    Smoking status: Some Days     Types: Vaping with nicotine, Vaping w/o nicotine    Smokeless tobacco: Current   Substance Use Topics    Alcohol use: No    Drug use: No        Grant Mcknight MD  25 0944

## 2025-06-12 NOTE — ED NOTES
I-STAT Chem-8+ Results:   Value Reference Range   Sodium 138 136-145 mmol/L   Potassium  3.9 3.5-5.1 mmol/L   Chloride 106  mmol/L   Ionized Calcium 1.24 1.06-1.42 mmol/L   CO2 (measured) 21 23-29 mmol/L   Glucose 90  mg/dL   BUN 12 6-30 mg/dL   Creatinine 0.6 0.5-1.4 mg/dL   Hematocrit 36 36-54%

## 2025-06-12 NOTE — Clinical Note
"Alessandro Boblalita" Sajan was seen and treated in our emergency department on 6/12/2025.  She may return to work on 06/13/2025.       If you have any questions or concerns, please don't hesitate to call.      Lorena Morfin, AYALAP     "

## 2025-06-12 NOTE — ED TRIAGE NOTES
Alessandro Moeller, a 19 y.o. female presents to the ED w/ complaint of hematuria with right side abdominal pain, pain since Saturday and bleeding started today    Triage note:  Chief Complaint   Patient presents with    Abdominal Pain     Started week ago     Review of patient's allergies indicates:  No Known Allergies        APPEARANCE: awake and alert in NAD. PAIN  8/10  SKIN: warm, dry and intact. No breakdown or bruising.  MUSCULOSKELETAL: Patient moving all extremities spontaneously, no obvious swelling or deformities noted. Ambulates independently.  RESPIRATORY: Denies shortness of breath.Respirations unlabored.   CARDIAC: Denies CP, 2+ distal pulses; no peripheral edema  ABDOMEN: S/ND/NT, Denies nausea  : voids spontaneously, denies difficulty  Neurologic: AAO x 4; follows commands equal strength in all extremities; denies numbness/tingling. Denies dizziness

## 2025-06-12 NOTE — DISCHARGE INSTRUCTIONS
You were seen in the Emergency Department today for abdominal pain and blood in your urine. Your testing was positive for a UTI and a yeas infection. STD testing was negative. Your ultrasound did not show any signs of ovarian problems.    You were given a dose of antibitoics for UTI and anti-fungal medicine for your yeast infection. If the discharge from the yeast infection does not get better by [address], you may take a repeat dose of the Fluconazole.    You should start taking the bactrim 1 tablet twice per day for the next 7 days for the UTI.    You may take Tylenol (Acetaminophen) 650 mg every 6 hours (no more than 3000 mg in 24 hours) and Motrin (Ibuprofen) 400 mg every 6 hours as needed for aches/pains/fevers.    Please follow up with your primary doctor in the next 3-5 days for a repeat evaluation and further management.    Please return to the Emergency Department immediately for any continued or worsening symptoms such as fever, chills, severe pain, nausea, vomiting.

## 2025-06-13 ENCOUNTER — RESULTS FOLLOW-UP (OUTPATIENT)
Dept: EMERGENCY MEDICINE | Facility: HOSPITAL | Age: 20
End: 2025-06-13
Payer: COMMERCIAL

## 2025-06-14 LAB
BACTERIA UR CULT: ABNORMAL
BACTERIA UR CULT: ABNORMAL